# Patient Record
Sex: MALE | Race: WHITE | Employment: OTHER | URBAN - METROPOLITAN AREA
[De-identification: names, ages, dates, MRNs, and addresses within clinical notes are randomized per-mention and may not be internally consistent; named-entity substitution may affect disease eponyms.]

---

## 2017-03-30 ENCOUNTER — TRANSFERRED RECORDS (OUTPATIENT)
Dept: HEALTH INFORMATION MANAGEMENT | Facility: CLINIC | Age: 67
End: 2017-03-30

## 2017-06-16 ENCOUNTER — APPOINTMENT (OUTPATIENT)
Age: 67
Setting detail: DERMATOLOGY
End: 2017-07-04

## 2017-06-16 DIAGNOSIS — Z85.828 PERSONAL HISTORY OF OTHER MALIGNANT NEOPLASM OF SKIN: ICD-10-CM

## 2017-06-16 DIAGNOSIS — L57.0 ACTINIC KERATOSIS: ICD-10-CM

## 2017-06-16 DIAGNOSIS — L82.1 OTHER SEBORRHEIC KERATOSIS: ICD-10-CM

## 2017-06-16 DIAGNOSIS — L82.0 INFLAMED SEBORRHEIC KERATOSIS: ICD-10-CM

## 2017-06-16 PROBLEM — D48.5 NEOPLASM OF UNCERTAIN BEHAVIOR OF SKIN: Status: ACTIVE | Noted: 2017-06-16

## 2017-06-16 PROCEDURE — 17000 DESTRUCT PREMALG LESION: CPT | Mod: 59

## 2017-06-16 PROCEDURE — 99213 OFFICE O/P EST LOW 20 MIN: CPT | Mod: 25

## 2017-06-16 PROCEDURE — OTHER BIOPSY BY SHAVE METHOD: OTHER

## 2017-06-16 PROCEDURE — 17111 DESTRUCT LESION 15 OR MORE: CPT

## 2017-06-16 PROCEDURE — OTHER LIQUID NITROGEN: OTHER

## 2017-06-16 PROCEDURE — 17003 DESTRUCT PREMALG LES 2-14: CPT

## 2017-06-16 PROCEDURE — 11100: CPT | Mod: 59

## 2017-06-16 PROCEDURE — OTHER MIPS QUALITY: OTHER

## 2017-06-16 PROCEDURE — OTHER COUNSELING: OTHER

## 2017-06-16 ASSESSMENT — LOCATION DETAILED DESCRIPTION DERM
LOCATION DETAILED: RIGHT INFERIOR CENTRAL MALAR CHEEK
LOCATION DETAILED: LEFT LATERAL MALAR CHEEK
LOCATION DETAILED: RIGHT SUPERIOR CENTRAL BUCCAL CHEEK
LOCATION DETAILED: LEFT SUPERIOR FOREHEAD
LOCATION DETAILED: RIGHT CENTRAL LATERAL NECK
LOCATION DETAILED: LEFT CENTRAL EYEBROW
LOCATION DETAILED: RIGHT SUPERIOR FOREHEAD
LOCATION DETAILED: LEFT LATERAL SUBMANDIBULAR CHEEK
LOCATION DETAILED: LEFT SUPERIOR LATERAL MALAR CHEEK
LOCATION DETAILED: LEFT SUPERIOR CENTRAL MALAR CHEEK
LOCATION DETAILED: RIGHT CENTRAL MALAR CHEEK
LOCATION DETAILED: LEFT INFERIOR LATERAL MALAR CHEEK

## 2017-06-16 ASSESSMENT — LOCATION SIMPLE DESCRIPTION DERM
LOCATION SIMPLE: NECK
LOCATION SIMPLE: LEFT FOREHEAD
LOCATION SIMPLE: LEFT EYEBROW
LOCATION SIMPLE: RIGHT CHEEK
LOCATION SIMPLE: LEFT CHEEK
LOCATION SIMPLE: RIGHT FOREHEAD

## 2017-06-16 ASSESSMENT — LOCATION ZONE DERM
LOCATION ZONE: NECK
LOCATION ZONE: FACE

## 2017-06-16 NOTE — PROCEDURE: BIOPSY BY SHAVE METHOD
Post-Care Instructions: I reviewed with the patient in detail post-care instructions. Patient is to keep the biopsy site dry overnight, and then apply H2O2, Vaseline and a bandage daily until healed.
Electrodesiccation And Curettage Text: The wound bed was treated with electrodesiccation and curettage after the biopsy was performed.
Dressing: telfa dressing
Anesthesia Volume In Cc (Will Not Render If 0): 0.1
Cryotherapy Text: The wound bed was treated with cryotherapy after the biopsy was performed.
Biopsy Method: Double edge Personna blades
Body Location Override (Optional - Billing Will Still Be Based On Selected Body Map Location If Applicable): right infra-auricular neck
Notification Instructions: Patient will be notified of biopsy results. However, patient instructed to call the office if not contacted within 2 weeks.
Biopsy Type: H and E
Destruction After The Procedure: Yes
Bill 55423 For Specimen Handling/Conveyance To Laboratory?: no
Type Of Destruction Used: Electrodesiccation
Silver Nitrate Text: The wound bed was treated with silver nitrate after the biopsy was performed.
Size Of Lesion In Cm: 0.5
Electrodesiccation Text: The wound bed was treated with electrodesiccation after the biopsy was performed.
Additional Anesthesia Volume In Cc (Will Not Render If 0): 0
Curettage Text: The wound bed was treated with curettage after the biopsy was performed.
Anesthesia Type: 1% lidocaine with epinephrine and a 1:10 solution of 8.4% sodium bicarbonate
Wound Care: Vaseline
Detail Level: Detailed
Hemostasis: Drysol
Consent: Written consent was obtained and risks were reviewed including but not limited to scarring, infection, bleeding, scabbing, incomplete removal, nerve damage and allergy to anesthesia.
Billing Type: Third-Party Bill

## 2017-06-16 NOTE — PROCEDURE: LIQUID NITROGEN
Post-Care Instructions: I reviewed with the patient in detail post-care instructions. Patient is to wear sunprotection, and avoid picking at any of the treated lesions. Pt may apply Vaseline to crusted or scabbing areas.
Add 52 Modifier (Optional): no
Number Of Freeze-Thaw Cycles: 1 freeze-thaw cycle
Medical Necessity Clause: This procedure was medically necessary because the lesions that were treated were:
Consent: The patient's consent was obtained including but not limited to risks of crusting, scabbing, blistering, scarring, darker or lighter pigmentary change, recurrence, incomplete removal and infection.
Duration Of Freeze Thaw-Cycle (Seconds): 15
Medical Necessity Information: It is in your best interest to select a reason for this procedure from the list below. All of these items fulfill various CMS LCD requirements except the new and changing color options.
Detail Level: Detailed
Total Number Of Lesions Treated: 18
Render Post-Care Instructions In Note?: yes
Detail Level: Simple

## 2017-06-16 NOTE — PROCEDURE: MIPS QUALITY
Quality 265: Biopsy Follow-Up: Biopsy results reviewed, communicated, tracked, and documented
Quality 110: Preventive Care And Screening: Influenza Immunization: Influenza Immunization previously received during influenza season
Detail Level: Detailed
Quality 431: Preventive Care And Screening: Unhealthy Alcohol Use - Screening: Patient screened for unhealthy alcohol use using a single question and scores less than 2 times per year
Quality 130: Documentation Of Current Medications In The Medical Record: Current Medications Documented
Quality 226: Preventive Care And Screening: Tobacco Use: Screening And Cessation Intervention: Patient screened for tobacco and never smoked

## 2017-07-24 ENCOUNTER — DOCUMENTATION ONLY (OUTPATIENT)
Dept: VASCULAR SURGERY | Facility: CLINIC | Age: 67
End: 2017-07-24

## 2017-07-24 ENCOUNTER — RADIANT APPOINTMENT (OUTPATIENT)
Dept: GENERAL RADIOLOGY | Facility: CLINIC | Age: 67
End: 2017-07-24
Attending: FAMILY MEDICINE
Payer: COMMERCIAL

## 2017-07-24 ENCOUNTER — OFFICE VISIT (OUTPATIENT)
Dept: FAMILY MEDICINE | Facility: CLINIC | Age: 67
End: 2017-07-24
Payer: COMMERCIAL

## 2017-07-24 VITALS
SYSTOLIC BLOOD PRESSURE: 120 MMHG | HEART RATE: 72 BPM | OXYGEN SATURATION: 95 % | BODY MASS INDEX: 42 KG/M2 | HEIGHT: 71 IN | WEIGHT: 300 LBS | DIASTOLIC BLOOD PRESSURE: 80 MMHG | TEMPERATURE: 98.9 F

## 2017-07-24 DIAGNOSIS — M79.674 PAIN OF TOE OF RIGHT FOOT: ICD-10-CM

## 2017-07-24 DIAGNOSIS — Z13.6 SCREENING FOR AAA (ABDOMINAL AORTIC ANEURYSM): ICD-10-CM

## 2017-07-24 DIAGNOSIS — M77.41 METATARSALGIA OF RIGHT FOOT: Primary | ICD-10-CM

## 2017-07-24 DIAGNOSIS — E66.01 MORBID OBESITY DUE TO EXCESS CALORIES (H): ICD-10-CM

## 2017-07-24 PROCEDURE — 99213 OFFICE O/P EST LOW 20 MIN: CPT | Performed by: FAMILY MEDICINE

## 2017-07-24 PROCEDURE — 73630 X-RAY EXAM OF FOOT: CPT | Mod: RT

## 2017-07-24 RX ORDER — NAPROXEN 500 MG/1
500 TABLET ORAL 2 TIMES DAILY PRN
Qty: 20 TABLET | Refills: 0 | Status: SHIPPED | OUTPATIENT
Start: 2017-07-24 | End: 2018-02-20

## 2017-07-24 NOTE — PROGRESS NOTES
"  SUBJECTIVE:                                                    Edis Beltre is a 67 year old male who presents to clinic today for the following health issues:    Toe Pain    Onset: had a fungus on the nail 3 months ago went away- last 6 weeks foot is sensitive    Description:   Location: 2nd toe right foot is painful- pad is painful also  Character: Dull ache with throbbing and pressure    Intensity: mild    Progression of Symptoms: same    Accompanying Signs & Symptoms:  Other symptoms: none  Starting to limp as pressure on the area hurts  Other joint pain: no  Pain does not shoot up toe    History:   Previous similar pain: no   History of gout: no      Precipitating factors:   Trauma or overuse: no   Patient wears crocs very often    Alleviating factors:  Improved by: rest/inactivity  Soaking and wrapping with treated bandage    Therapies Tried and outcome nothing  Patient will be doing a lot of lifting in 2 weeks      Patient was taking a statin and self-stopped due to bad muscle aches.    Problem list and histories reviewed & adjusted, as indicated.  Additional history: as documented    ROS:  Constitutional, HEENT, cardiovascular, pulmonary, GI, , musculoskeletal, neuro, skin, endocrine and psych systems are negative, except as otherwise noted.    This document serves as a record of the services and decisions personally performed and made by Pedro Brasher MD. It was created on his behalf by Juana Dolan, a trained medical scribe. The creation of this document is based the provider's statements to the medical scribe.  Juana Dolan   OBJECTIVE:                                                    /80 (BP Location: Left arm, Patient Position: Chair, Cuff Size: Adult Large)  Pulse 72  Temp 98.9  F (37.2  C) (Oral)  Ht 1.803 m (5' 11\")  Wt 136.1 kg (300 lb)  SpO2 95%  BMI 41.84 kg/m2 Body mass index is 41.84 kg/(m^2).   GENERAL: healthy, alert, well nourished, well hydrated, no distress  EYES: " "Eyes grossly normal to inspection, extraocular movements - intact  MS: moderate tenderness to 2nd MCP and between the 1st and 2nd MCP on the right, otherwise, extremities- no gross deformities noted, no edema  SKIN: no suspicious lesions, no rashes  PSYCH: Alert and oriented times 3; speech- coherent , normal rate and volume; able to articulate logical thoughts, able to abstract reason, no tangential thoughts, no hallucinations or delusions, affect- normal  Diagnostic test results: Right foot Xray:  Slight calcification of 2nd MCP joint, pending formal reading from radiologist     ASSESSMENT/PLAN:         Edis was seen today for toe pain.    Diagnoses and all orders for this visit:    Metatarsalgia of right foot: New onset, we discussed the importance of wearing supportive shoes and he may also try a metatarsal pad for 2-4 weeks. He will also take naproxen as needed and ice to help symptoms. Given a handout on metatarsalgia. May consider injection if symptoms persist.  -     naproxen (NAPROSYN) 500 MG tablet; Take 1 tablet (500 mg) by mouth 2 times daily as needed for moderate pain    Pain of toe of right foot: new onset, see above  -     [facility] NOTIFICATION  -     XR Foot Right G/E 3 Views; Future    Morbid obesity due to excess calories (H): Unchanged    Screening for AAA (abdominal aortic aneurysm)  -     [facility] NOTIFICATION    Risks, benefits and alternatives of treatments discussed. Plan agreed on.      Followup: prn    Will call, return to clinic, or go to ED if worsening or symptoms not improving as discussed.    See patient instructions.     BMI:   Estimated body mass index is 41.84 kg/(m^2) as calculated from the following:    Height as of this encounter: 1.803 m (5' 11\").    Weight as of this encounter: 136.1 kg (300 lb).   Weight management plan: Discussed healthy diet and exercise guidelines and patient will follow up in 6 months in clinic to re-evaluate.        Health Maintenance Topics " with due status: Overdue       Topic Date Due    HEPATITIS C SCREENING 05/24/1966    [facility] ANEURYSM SCREENING (SYSTEM ASSIGNED) 05/24/2013    FALL RISK ASSESSMENT 05/15/2016    LIPID MONITORING Q1 YEAR 02/17/2017    PSA Q1 YR 02/17/2017    WELLNESS VISIT Q1 YR 02/17/2017    BMP Q1 YR 02/26/2017       Health maintenance reviewed/updated? Yes  The information in this document, created by the medical scribe for me, accurately reflects the services I personally performed and the decisions made by me. I have reviewed and approved this document for accuracy prior to leaving the patient care area.  Pedro Brasher MD July 24, 2017 11:13 AM        Chalino Brasher MD

## 2017-07-24 NOTE — MR AVS SNAPSHOT
After Visit Summary   7/24/2017    Edis Beltre    MRN: [id number]           Patient Information     Date Of Birth          1950        Visit Information        Provider Department      7/24/2017 10:40 AM Pedro Brasher MD [facility]        Today's Diagnoses     Metatarsalgia of right foot    -  1    Pain of toe of right foot        Morbid obesity due to excess calories (H)        Screening for AAA (abdominal aortic aneurysm)          Care Instructions      What is Metatarsalgia?  Metatarsalgia is pain in the ball of your foot. It is often caused by wearing shoes with thin soles and high heels. This puts extra pressure on the bones in the ball of the foot. Standing or walking on a hard surface for long periods also puts added pressure on the bones, causing pain. The pain can occur under any of the five metatarsal bones, although it's most common in the second. Bent or twisted toes and bunions can make the problem worse. So can being overweight. Sometimes high arches or arthritis can also cause metatarsalgia.    Inside the ball of your foot  The long bones in the middle of your foot are called the metatarsal bones. Each metatarsal bone ends in the ball of the foot. When you walk, these bones bear the weight of your body as your foot pushes off the ground. If there is more pressure on the end of one bone, it presses on the skin beneath it. This causes pain and inflammation in the ball of the foot (metatarsalgia). A callus (a hard growth of skin) may also form on the ball of the foot.    Symptoms  The most common symptom of metatarsalgia is pain in the ball of the foot. It may feel as if you have a stone in your shoe. The ball of the foot may also become red and inflamed, and a callus may form under the end of the metatarsal bone.   Date Last Reviewed: 9/29/2015 2000-2017 [employer]. [address] [place] [address], [place] [address]. All rights reserved. This  "information is not intended as a substitute for professional medical care. Always follow your healthcare professional's instructions.        Treating Metatarsalgia  Metatarsalgia is pain in the \"ball of your foot,\" the area between your arch and your toes. The pain usually starts in one or more of the five bones in this area under the toes (these bones are called the metatarsals). Often, a callus builds up in this area.In most cases, wearing low-heeled, well-cushioned shoes and filing down the callus will relieve the pain. If these steps don t work, your healthcare provider may suggest surgery to remove part of the bone. To take pressure off the ball of your foot and relieve the pain, your healthcare provider may suggest that you do one or more of the following.  Wear shoes with padded soles  Wear shoes with thick padding in the soles. Keep heels low, and make sure the shoe is wide across the ball of your foot and your toes.    Using a metatarsal pad  Put a metatarsal pad in your shoe. This lifts and takes pressure off the painful area. To insert the pad:    Put a small lipstick beto on the callus.    Step into the shoe to leave a beto on the insole.    Peel the backing off the pad. Then put the pad in the shoe just behind the lipstick beto.  Inserts with built-in metatarsal pads may also be available.  Filing the callus    Soak your foot in warm water for a few minutes to soften the skin.    Dry the foot.    Then gently rub the callus with a pumice stone or nail file to remove the hard skin. Stop if bleeding or pain occurs.    Diabetes should get foot care from healthcare providers familiar with diabetes care.  Date Last Reviewed: 9/29/2015 2000-2017 [employer]. [address] [place] [address], [place] [address]. All rights reserved. This information is not intended as a substitute for professional medical care. Always follow your healthcare professional's instructions.                Follow-ups after your " "visit        Who to contact     If you have questions or need follow up information about today's clinic visit or your schedule please contact [facility] directly at [phone].  Normal or non-critical lab and imaging results will be communicated to you by MyChart, letter or phone within 4 business days after the clinic has received the results. If you do not hear from us within 7 days, please contact the clinic through [facility] or phone. If you have a critical or abnormal lab result, we will notify you by phone as soon as possible.  Submit refill requests through [facility] or call your pharmacy and they will forward the refill request to us. Please allow 3 business days for your refill to be completed.          Additional Information About Your Visit        [facility] Information     [facility] gives you secure access to your electronic health record. If you see a primary care provider, you can also send messages to your care team and make appointments. If you have questions, please call your primary care clinic.  If you do not have a primary care provider, please call [phone] and they will assist you.        Care EveryWhere ID     This is your Care EveryWhere ID. This could be used by other organizations to access your [facility] medical records  [id number]        Your Vitals Were     Pulse Temperature Height Pulse Oximetry BMI (Body Mass Index)       72 98.9  F (37.2  C) (Oral) 5' 11\" (1.803 m) 95% 41.84 kg/m2        Blood Pressure from Last 3 Encounters:   07/24/17 120/80   11/01/16 138/76   04/19/16 140/72    Weight from Last 3 Encounters:   07/24/17 300 lb (136.1 kg)   11/01/16 296 lb (134.3 kg)   04/19/16 296 lb (134.3 kg)              We Performed the Following     [facility] NOTIFICATION          Today's Medication Changes          These changes are accurate as of: 7/24/17 11:48 AM.  If you have any questions, ask your nurse or doctor.               Start taking these medicines.        " Dose/Directions    naproxen 500 MG tablet   Commonly known as:  NAPROSYN   Used for:  Metatarsalgia of right foot   Started by:  Pedro Brasher MD        Dose:  500 mg   Take 1 tablet (500 mg) by mouth 2 times daily as needed for moderate pain   Quantity:  20 tablet   Refills:  0         These medicines have changed or have updated prescriptions.        Dose/Directions    omeprazole 20 MG CR capsule   Commonly known as:  priLOSEC   This may have changed:  Another medication with the same name was removed. Continue taking this medication, and follow the directions you see here.   Used for:  Gastroesophageal reflux disease without esophagitis   Changed by:  Pedro Brasher MD        TAKE 1 CAPSULE EVERY DAY  30  TO  60  MINUTES BEFORE A MEAL   Quantity:  90 capsule   Refills:  1         Stop taking these medicines if you haven't already. Please contact your care team if you have questions.     atorvastatin 20 MG tablet   Commonly known as:  LIPITOR   Stopped by:  Pedro Brasher MD                Where to get your medicines      These medications were sent to [facility] [address] [place] [id number] [facility]  [id number] [place] [address]     Phone:  [phone]     naproxen 500 MG tablet                Primary Care Provider Office Phone # Fax #    Pedro Brasher [phone]       [address] [address]        Equal Access to Services     [facility] AH: Hadii tyree ku hadasho Soomaali, waaxda luqadaha, qaybta kaalmada adeegyada, waxay mariselain haybal sosa . So [facility] [phone].    ATENCIÓN: Si habla español, tiene a medel disposición servicios gratuitos de asistencia lingüística. Llame al [phone].    We comply with applicable federal civil rights laws and [place] laws. We do not discriminate on the basis of race, color, national origin, age, disability sex, sexual orientation or gender identity.            Thank  you!     Thank you for choosing [facility]  for your care. Our goal is always to provide you with excellent care. Hearing back from our patients is one way we can continue to improve our services. Please take a few minutes to complete the written survey that you may receive in the mail after your visit with us. Thank you!             Your Updated Medication List - Protect others around you: Learn how to safely use, store and throw away your medicines at www.disposemymeds.org.          This list is accurate as of: 7/24/17 11:48 AM.  Always use your most recent med list.                   Brand Name Dispense Instructions for use Diagnosis    aspirin 81 MG tablet     30 tablet    Take by mouth daily        Daily Multivitamin Tabs      1 TABLET DAILY        fexofenadine-pseudoePHEDrine  MG per 12 hr tablet    ALLEGRA-D    28 tablet    Take 1 tablet by mouth 2 times daily    Acute frontal sinusitis, recurrence not specified       fish oil-omega-3 fatty acids 1000 MG capsule      Take 1 g by mouth daily        naproxen 500 MG tablet    NAPROSYN    20 tablet    Take 1 tablet (500 mg) by mouth 2 times daily as needed for moderate pain    Metatarsalgia of right foot       omeprazole 20 MG CR capsule    priLOSEC    90 capsule    TAKE 1 CAPSULE EVERY DAY  30  TO  60  MINUTES BEFORE A MEAL    Gastroesophageal reflux disease without esophagitis       order for DME     1 Device    Equipment being ordered: CPAP and all needed supplies.    Hypersomnia with sleep apnea, unspecified       pravastatin 20 MG tablet    PRAVACHOL    90 tablet    Take 1 tablet (20 mg) by mouth daily    Hyperlipidemia LDL goal <100

## 2017-07-24 NOTE — PATIENT INSTRUCTIONS
"  What is Metatarsalgia?  Metatarsalgia is pain in the ball of your foot. It is often caused by wearing shoes with thin soles and high heels. This puts extra pressure on the bones in the ball of the foot. Standing or walking on a hard surface for long periods also puts added pressure on the bones, causing pain. The pain can occur under any of the five metatarsal bones, although it's most common in the second. Bent or twisted toes and bunions can make the problem worse. So can being overweight. Sometimes high arches or arthritis can also cause metatarsalgia.    Inside the ball of your foot  The long bones in the middle of your foot are called the metatarsal bones. Each metatarsal bone ends in the ball of the foot. When you walk, these bones bear the weight of your body as your foot pushes off the ground. If there is more pressure on the end of one bone, it presses on the skin beneath it. This causes pain and inflammation in the ball of the foot (metatarsalgia). A callus (a hard growth of skin) may also form on the ball of the foot.    Symptoms  The most common symptom of metatarsalgia is pain in the ball of the foot. It may feel as if you have a stone in your shoe. The ball of the foot may also become red and inflamed, and a callus may form under the end of the metatarsal bone.   Date Last Reviewed: 9/29/2015 2000-2017 [employer]. [address] [place] [address]. All rights reserved. This information is not intended as a substitute for professional medical care. Always follow your healthcare professional's instructions.        Treating Metatarsalgia  Metatarsalgia is pain in the \"ball of your foot,\" the area between your arch and your toes. The pain usually starts in one or more of the five bones in this area under the toes (these bones are called the metatarsals). Often, a callus builds up in this area.In most cases, wearing low-heeled, well-cushioned shoes and filing down the callus will " relieve the pain. If these steps don t work, your healthcare provider may suggest surgery to remove part of the bone. To take pressure off the ball of your foot and relieve the pain, your healthcare provider may suggest that you do one or more of the following.  Wear shoes with padded soles  Wear shoes with thick padding in the soles. Keep heels low, and make sure the shoe is wide across the ball of your foot and your toes.    Using a metatarsal pad  Put a metatarsal pad in your shoe. This lifts and takes pressure off the painful area. To insert the pad:    Put a small lipstick beto on the callus.    Step into the shoe to leave a beto on the insole.    Peel the backing off the pad. Then put the pad in the shoe just behind the lipstick beto.  Inserts with built-in metatarsal pads may also be available.  Filing the callus    Soak your foot in warm water for a few minutes to soften the skin.    Dry the foot.    Then gently rub the callus with a pumice stone or nail file to remove the hard skin. Stop if bleeding or pain occurs.    Diabetes should get foot care from healthcare providers familiar with diabetes care.  Date Last Reviewed: 9/29/2015 2000-2017 [employer]. [address] [place] [address], [place] [address]. All rights reserved. This information is not intended as a substitute for professional medical care. Always follow your healthcare professional's instructions.

## 2017-07-24 NOTE — NURSING NOTE
"Chief Complaint   Patient presents with     Toe Pain       Initial /80 (BP Location: Left arm, Patient Position: Chair, Cuff Size: Adult Large)  Pulse 72  Temp 98.9  F (37.2  C) (Oral)  Ht 5' 11\" (1.803 m)  Wt 300 lb (136.1 kg)  SpO2 95%  BMI 41.84 kg/m2 Estimated body mass index is 41.84 kg/(m^2) as calculated from the following:    Height as of this encounter: 5' 11\" (1.803 m).    Weight as of this encounter: 300 lb (136.1 kg).  Medication Reconciliation: complete  "

## 2017-07-31 ENCOUNTER — APPOINTMENT (OUTPATIENT)
Age: 67
Setting detail: DERMATOLOGY
End: 2017-08-01

## 2017-07-31 PROBLEM — C44.91 BASAL CELL CARCINOMA OF SKIN, UNSPECIFIED: Status: ACTIVE | Noted: 2017-07-31

## 2017-07-31 PROCEDURE — OTHER MOHS SURGERY PHONE CONSULTATION: OTHER

## 2017-07-31 NOTE — HPI: MOHS SURGERY CONSULTATION
Additional History: The patient has received a prescription of Amoxicillin 500mg 4 po prior to surgery, from his dentist. He plans to take this tomorrow morning prior to his appointment at 8:00 am. He will be accompanied by his wife, Silvina, and will be performing his own wound care.

## 2017-07-31 NOTE — PROCEDURE: MOHS SURGERY PHONE CONSULTATION
Patient Reported Location: right neck
Does The Patient Take Antibiotics Prior To Dental Procedures?: Yes
Has The Patient Ever Had A Heart Attack Or Stroke?: No
If Yes- Additional Details: right knee (2016)
If Yes- What Blood Thinners Do They Take?: 81mg Aspirin
Date Of Mohs Surgery: 08/01/2017
Detail Level: Detailed
Referring Provider: Nani Barrett MPhil, MD
Office Location Of Mohs Surgery: [facility]

## 2017-08-01 ENCOUNTER — APPOINTMENT (OUTPATIENT)
Age: 67
Setting detail: DERMATOLOGY
End: 2017-08-04

## 2017-08-01 PROBLEM — C44.41 BASAL CELL CARCINOMA OF SKIN OF SCALP AND NECK: Status: ACTIVE | Noted: 2017-08-01

## 2017-08-01 PROCEDURE — OTHER MOHS SURGERY: OTHER

## 2017-08-01 PROCEDURE — 17312 MOHS ADDL STAGE: CPT

## 2017-08-01 PROCEDURE — 17311 MOHS 1 STAGE H/N/HF/G: CPT

## 2017-08-01 PROCEDURE — 13132 CMPLX RPR F/C/C/M/N/AX/G/H/F: CPT

## 2017-08-01 NOTE — PROCEDURE: MOHS SURGERY
Stage 6: Additional Anesthesia Volume In Cc: 0
Unna Boot Applied: No
Consent (Spinal Accessory)/Introductory Paragraph: The rationale for Mohs was explained to the patient and consent was obtained. The risks, benefits and alternatives to therapy were discussed in detail. Specifically, the risks of damage to the spinal accessory nerve, infection, scarring, bleeding, prolonged wound healing, incomplete removal, allergy to anesthesia, and recurrence were addressed. Prior to the procedure, the treatment site was clearly identified and confirmed by the patient. All components of Universal Protocol/PAUSE Rule completed.
Purse String (Intermediate) Text: Given the location of the defect and the characteristics of the surrounding skin a pursestring intermediate closure was deemed most appropriate.  Undermining was performed circumfirentially around the surgical defect.  A purstring suture was then placed and tightened.
Tarsorrhaphy Text: A tarsorrhaphy was performed using Frost sutures.
Cheiloplasty (Complex) Text: A decision was made to reconstruct the defect with a  cheiloplasty.  The defect was undermined extensively.  Additional obicularis oris muscle was excised with a 15 blade scalpel.  The defect was converted into a full thickness wedge to facilite a better cosmetic result.  Small vessels were then tied off with 5-0 monocyrl. The obicularis oris, superficial fascia, adipose and dermis were then reapproximated.  After the deeper layers were approximated the epidermis was reapproximated with particular care given to realign the vermillion border.
Complex Repair And Graft Additional Text (Will Appearing After The Standard Complex Repair Text): The complex repair was not sufficient to completely close the primary defect. The remaining additional defect was repaired with the graft mentioned below.
Home Suture Removal Text: Patient was provided instructions on removing sutures and will remove their sutures at home.  If they have any questions or difficulties they will call the office.
Initial Size Of Lesion: 1.2
No Repair - Repaired With Adjacent Surgical Defect Text (Leave Blank If You Do Not Want): After obtaining clear surgical margins the defect was repaired concurrently with another surgical defect which was in close approximation.
Anesthesia Type: 1% lidocaine with epinephrine and a 1:10 solution of 8.4% sodium bicarbonate
Purse String (Simple) Text: Given the location of the defect and the characteristics of the surrounding skin a pursestring closure was deemed most appropriate.  Undermining was performed circumfirentially around the surgical defect.  A purstring suture was then placed and tightened.
Advancement-Rotation Flap Text: The defect edges were debeveled with a #15 scalpel blade.  Given the location of the defect, shape of the defect and the proximity to free margins an advancement-rotation flap was deemed most appropriate.  Using a sterile surgical marker, an appropriate flap was drawn incorporating the defect and placing the expected incisions within the relaxed skin tension lines where possible. The area thus outlined was incised deep to adipose tissue with a #15 scalpel blade.  The skin margins were undermined to an appropriate distance in all directions utilizing iris scissors.
H Plasty Text: Given the location of the defect, shape of the defect and the proximity to free margins a H-plasty was deemed most appropriate for repair.  Using a sterile surgical marker, the appropriate advancement arms of the H-plasty were drawn incorporating the defect and placing the expected incisions within the relaxed skin tension lines where possible. The area thus outlined was incised deep to adipose tissue with a #15 scalpel blade. The skin margins were undermined to an appropriate distance in all directions utilizing iris scissors.  The opposing advancement arms were then advanced into place in opposite direction and anchored with interrupted buried subcutaneous sutures.
Mohs Case Number: 
Alternatives Discussed Intro (Do Not Add Period): I discussed alternative treatments to Mohs surgery and specifically discussed the risks and benefits of
Consent (Temporal Branch)/Introductory Paragraph: The rationale for Mohs was explained to the patient and consent was obtained. The risks, benefits and alternatives to therapy were discussed in detail. Specifically, the risks of damage to the temporal branch of the facial nerve, infection, scarring, bleeding, prolonged wound healing, incomplete removal, allergy to anesthesia, and recurrence were addressed. Prior to the procedure, the treatment site was clearly identified and confirmed by the patient. All components of Universal Protocol/PAUSE Rule completed.
Consent (Ear)/Introductory Paragraph: The rationale for Mohs was explained to the patient and consent was obtained. The risks, benefits and alternatives to therapy were discussed in detail. Specifically, the risks of ear deformity, infection, scarring, bleeding, prolonged wound healing, incomplete removal, allergy to anesthesia, nerve injury and recurrence were addressed. Prior to the procedure, the treatment site was clearly identified and confirmed by the patient. All components of Universal Protocol/PAUSE Rule completed.
Advancement Flap (Single) Text: In order to preserve normal anatomic and functional relationships, a single advancement flap was deemed the most appropriate reconstructive procedure.  The beveled edges of the Mohs defect were excised with a #15 scalpel blade.    The flap was designed to fall along relaxed skin tension lines and/or free margins, incised, and elevated using blunt curved tissue scissors.  Hemostasis was achieved.  Interrupted buried vertical mattress sutures were employed to secure the flap in place.  Redundant cutaneous columns defined by the flap's movement were removed to the adipose layer using the triangulation technique and repaired in similar fashion.  Final epidermal approximation along the length of the flap was achieved using epidermal sutures.  The wound was stressed in various directions to ensure the adequacy of the closure and of hemostasis.  The flap edges appeared pink and well perfused.  Reconstruction was considered complete.
Show Additional Anesthesia Variables In The Stage Tabs: Yes
Spiral Flap Text: The defect edges were debeveled with a #15 scalpel blade.  Given the location of the defect, shape of the defect and the proximity to free margins a spiral flap was deemed most appropriate.  Using a sterile surgical marker, an appropriate rotation flap was drawn incorporating the defect and placing the expected incisions within the relaxed skin tension lines where possible. The area thus outlined was incised deep to adipose tissue with a #15 scalpel blade.  The skin margins were undermined to an appropriate distance in all directions utilizing iris scissors.
Anesthesia Type: 1% lidocaine with 1:100,000 epinephrine and a 1:10 solution of 8.4% sodium bicarbonate
Repair Performed By Another Provider Text (Leave Blank If You Do Not Want): After obtaining clear surgical margins the defect was repaired by another provider.
Number Of Stages: 2
Closure 2 Information: This tab is for additional flaps and grafts, including complex repair and grafts and complex repair and flaps. You can also specify a different location for the additional defect, if the location is the same you do not need to select a new one. We will insert the automated text for the repair you select below just as we do for solitary flaps and grafts. Please note that at this time if you select a location with a different insurance zone you will need to override the ICD10 and CPT if appropriate.
Consent (Scalp)/Introductory Paragraph: The rationale for Mohs was explained to the patient and consent was obtained. The risks, benefits and alternatives to therapy were discussed in detail. Specifically, the risks of changes in hair growth pattern secondary to repair, infection, scarring, bleeding, prolonged wound healing, incomplete removal, allergy to anesthesia, nerve injury and recurrence were addressed. Prior to the procedure, the treatment site was clearly identified and confirmed by the patient. All components of Universal Protocol/PAUSE Rule completed.
Post-Care Instructions: The patient was provided with detailed verbal and written instructions for daily wound care, including use of H2O2 cleansing, followed by application of plain Vaseline and a bandage.  These instructions including Dr. Barrett's contact information should there be any questions or concerns.  The patient is not to engage in any heavy lifting, exercise, or swimming for the next week.  Should the patient develop any fevers, chills, bleeding, or pain not controlled by OTC analgesics, s/he should contact the office immediately.
Anesthesia Volume In Cc: 6
Star Wedge Flap Text: The defect edges were debeveled with a #15 scalpel blade.  Given the location of the defect, shape of the defect and the proximity to free margins a star wedge flap was deemed most appropriate.  Using a sterile surgical marker, an appropriate rotation flap was drawn incorporating the defect and placing the expected incisions within the relaxed skin tension lines where possible. The area thus outlined was incised deep to adipose tissue with a #15 scalpel blade.  The skin margins were undermined to an appropriate distance in all directions utilizing iris scissors.
Medical Necessity Statement: Based on my medical judgement, Mohs surgery is the most appropriate treatment for this cancer compared to other treatments.
Partial Purse String (Intermediate) Text: Given the location of the defect and the characteristics of the surrounding skin an intermediate purse string closure was deemed most appropriate.  Undermining was performed circumfirentially around the surgical defect.  A purse string suture was then placed and tightened. Wound tension only allowed a partial closure of the circular defect.
Closure 4 Information: This tab is for additional flaps and grafts above and beyond our usual structured repairs.  Please note if you enter information here it will not currently bill and you will need to add the billing information manually.
Muscle Hinge Flap Text: The defect edges were debeveled with a #15 scalpel blade.  Given the size, depth and location of the defect and the proximity to free margins a muscle hinge flap was deemed most appropriate.  Using a sterile surgical marker, an appropriate hinge flap was drawn incorporating the defect. The area thus outlined was incised with a #15 scalpel blade.  The skin margins were undermined to an appropriate distance in all directions utilizing iris scissors.
Interpolation Flap Text: A decision was made to reconstruct the defect utilizing an interpolation axial flap and a staged reconstruction.  A telfa template was made of the defect.  This telfa template was then used to outline the interpolation flap.  The donor area for the pedicle flap was then injected with anesthesia.  The flap was excised through the skin and subcutaneous tissue down to the layer of the underlying musculature.  The interpolation flap was carefully excised within this deep plane to maintain its blood supply.  The edges of the donor site were undermined.   The donor site was closed in a primary fashion.  The pedicle was then rotated into position and sutured.  Once the tube was sutured into place, adequate blood supply was confirmed with blanching and refill.  The pedicle was then wrapped with xeroform gauze and dressed appropriately with a telfa and gauze bandage to ensure continued blood supply and protect the attached pedicle.
Hatchet Flap Text: The defect edges were debeveled with a #15 scalpel blade.  Given the location of the defect, shape of the defect and the proximity to free margins a hatchet flap was deemed most appropriate.  Using a sterile surgical marker, an appropriate hatchet flap was drawn incorporating the defect and placing the expected incisions within the relaxed skin tension lines where possible.    The area thus outlined was incised deep to adipose tissue with a #15 scalpel blade.  The skin margins were undermined to an appropriate distance in all directions utilizing iris scissors.
Ear Wedge Repair Text: A wedge excision was completed by carrying down an excision through the full thickness of the ear and cartilage with an inward facing Burow's triangle. The wound was then closed in a layered fashion.
Repair Anesthesia Method: local infiltration
Area L Indication Text: Tumors in this location are included in Area L (trunk and extremities).  Mohs surgery is indicated for larger tumors, or tumors with aggressive histologic features, in these anatomic locations.
Area H Indication Text: Tumors in this location are included in Area H (eyelids, eyebrows, nose, lips, chin, ear, pre-auricular, post-auricular, temple, genitalia, hands, feet, ankles and areola).  Tissue conservation is critical in these anatomic locations.
Full Thickness Lip Wedge Repair (Flap) Text: Given the location of the defect and the proximity to free margins a full thickness wedge repair was deemed most appropriate.  Using a sterile surgical marker, the appropriate repair was drawn incorporating the defect and placing the expected incisions perpendicular to the vermilion border.  The vermilion border was also meticulously outlined to ensure appropriate reapproximation during the repair.  The area thus outlined was incised through and through with a #15 scalpel blade.  The muscularis and dermis were reaproximated with deep sutures following hemostasis. Care was taken to realign the vermilion border before proceeding with the superficial closure.  Once the vermilion was realigned the superfical and mucosal closure was finished.
Epidermal Closure Graft Donor Site (Optional): simple interrupted
Consent 3/Introductory Paragraph: I gave the patient a chance to ask questions they had about the procedure.  Following this I explained the Mohs procedure and consent was obtained. The risks, benefits and alternatives to therapy were discussed in detail. Specifically, the risks of infection, scarring, bleeding, prolonged wound healing, incomplete removal, allergy to anesthesia, nerve injury and recurrence were addressed. Prior to the procedure, the treatment site was clearly identified and confirmed by the patient. All components of Universal Protocol/PAUSE Rule completed.
Stage 2: Additional Anesthesia Type: 1% lidocaine with epinephrine
Skin Substitute Text: The defect edges were debeveled with a #15 scalpel blade.  Given the location of the defect, shape of the defect and the proximity to free margins a skin substitute graft was deemed most appropriate.  The graft material was trimmed to fit the size of the defect. The graft was then placed in the primary defect and oriented appropriately.
Unna Boot Text: An Unna boot was placed to help immobilize the limb and facilitate more rapid healing.
Epidermal Closure: running
Consent (Nose)/Introductory Paragraph: The rationale for Mohs was explained to the patient and consent was obtained. The risks, benefits and alternatives to therapy were discussed in detail. Specifically, the risks of nasal deformity, changes in the flow of air through the nose, infection, scarring, bleeding, prolonged wound healing, incomplete removal, allergy to anesthesia, nerve injury and recurrence were addressed. Prior to the procedure, the treatment site was clearly identified and confirmed by the patient. All components of Universal Protocol/PAUSE Rule completed.
Body Location Override (Optional - Billing Will Still Be Based On Selected Body Map Location If Applicable): Right Infra-auricular Neck
Consent (Marginal Mandibular)/Introductory Paragraph: The rationale for Mohs was explained to the patient and consent was obtained. The risks, benefits and alternatives to therapy were discussed in detail. Specifically, the risks of damage to the marginal mandibular branch of the facial nerve, infection, scarring, bleeding, prolonged wound healing, incomplete removal, allergy to anesthesia, and recurrence were addressed. Prior to the procedure, the treatment site was clearly identified and confirmed by the patient. All components of Universal Protocol/PAUSE Rule completed.
Surgeon Performing Repair (Optional): Nani Barrett MD
Suture Removal: 9 days
Alar Island Pedicle Flap Text: The defect edges were debeveled with a #15 scalpel blade.  Given the location of the defect, shape of the defect and the proximity to the alar rim an island pedicle advancement flap was deemed most appropriate.  Using a sterile surgical marker, an appropriate advancement flap was drawn incorporating the defect, outlining the appropriate donor tissue and placing the expected incisions within the nasal ala running parallel to the alar rim. The area thus outlined was incised with a #15 scalpel blade.  The skin margins were undermined minimally to an appropriate distance in all directions around the primary defect and laterally outward around the island pedicle utilizing iris scissors.  There was minimal undermining beneath the pedicle flap.
Consent (Near Eyelid Margin)/Introductory Paragraph: The rationale for Mohs was explained to the patient and consent was obtained. The risks, benefits and alternatives to therapy were discussed in detail. Specifically, the risks of ectropion or eyelid deformity, infection, scarring, bleeding, prolonged wound healing, incomplete removal, allergy to anesthesia, nerve injury and recurrence were addressed. Prior to the procedure, the treatment site was clearly identified and confirmed by the patient. All components of Universal Protocol/PAUSE Rule completed.
Estimated Blood Loss (Cc): minimal
Consent 2/Introductory Paragraph: The rationale for Mohs surgery was explained to the patient and consent was obtained. The risks, benefits and alternatives to therapy were discussed in detail. Specifically, the risks of infection, scarring, bleeding, prolonged wound healing, incomplete removal, allergy to anesthesia, nerve injury and recurrence were addressed. Prior to the procedure, the treatment site was clearly identified and confirmed by the patient. All components of Universal Protocol/PAUSE Rule completed.
Hemostasis: Aluminum Chloride
Location Indication Override (Is Already Calculated Based On Selected Body Location): Area M
Stage 1: Number Of Blocks?: 1
Bilobed Transposition Flap Text: The defect edges were debeveled with a #15 scalpel blade.  Given the location of the defect and the proximity to free margins a bilobed transposition flap was deemed most appropriate.  Using a sterile surgical marker, an appropriate bilobe flap drawn around the defect.    The area thus outlined was incised deep to adipose tissue with a #15 scalpel blade.  The skin margins were undermined to an appropriate distance in all directions utilizing iris scissors.
Detail Level: Detailed
Mauc Instructions: By selecting yes to the question below the MAUC number will be added into the note.  This will be calculated automatically based on the diagnosis chosen, the size entered, the body zone selected (H,M,L) and the specific indications you chose. You will also have the option to override the Mohs AUC if you disagree with the automatically calculated number and this option is found in the Case Summary tab.
Epidermal Sutures: 5-0 Surgipro
Tumor Debulked?: curette
Repair Type: Complex Repair
Complex Repair And Flap Additional Text (Will Appearing After The Standard Complex Repair Text): The complex repair was not sufficient to completely close the primary defect. The remaining additional defect was repaired with the flap mentioned below.
Complex Repair Preamble Text (Leave Blank If You Do Not Want): Extensive wide and differential undermining were performed.  During reconstruction, hemostasis was performed using electrofulguration.  Initial buried vertical mattress sutures defined redundant cutaneous columns (Burow's triangles) which were removed to the level of the adipose tissue using a #15 blade scalpel and the triangulation technique.  Hemostasis was obtained and the resulting defects were repaired with the same suture material and techniques.  The epidermis was then carefully apposed using polypropylene suture (running).  The wound was then stressed in various directions to assure the adequacy of closure and of hemostasis.  The wound edges were pink and well perfused.  Reconstruction was considered complete.
Primary Defect Width In Cm (Final Defect Size - Required For Flaps/Grafts): 1.1
Primary Defect Length In Cm (Final Defect Size - Required For Flaps/Grafts): 1.5
Keystone Flap Text: The defect edges were debeveled with a #15 scalpel blade.  Given the location of the defect, shape of the defect a keystone flap was deemed most appropriate.  Using a sterile surgical marker, an appropriate keystone flap was drawn incorporating the defect, outlining the appropriate donor tissue and placing the expected incisions within the relaxed skin tension lines where possible. The area thus outlined was incised deep to adipose tissue with a #15 scalpel blade.  The skin margins were undermined to an appropriate distance in all directions around the primary defect and laterally outward around the flap utilizing iris scissors.
Crescentic Advancement Flap Text: The defect edges were debeveled with a #15 scalpel blade.  Given the location of the defect and the proximity to free margins a crescentic advancement flap was deemed most appropriate.  Using a sterile surgical marker, the appropriate advancement flap was drawn incorporating the defect and placing the expected incisions within the relaxed skin tension lines where possible.    The area thus outlined was incised deep to adipose tissue with a #15 scalpel blade.  The skin margins were undermined to an appropriate distance in all directions utilizing iris scissors.
Bilateral Helical Rim Advancement Flap Text: The defect edges were debeveled with a #15 blade scalpel.  Given the location of the defect and the proximity to free margins (helical rim) a bilateral helical rim advancement flap was deemed most appropriate.  Using a sterile surgical marker, the appropriate advancement flaps were drawn incorporating the defect and placing the expected incisions between the helical rim and antihelix where possible.  The area thus outlined was incised through and through with a #15 scalpel blade.  With a skin hook and iris scissors, the flaps were gently and sharply undermined and freed up.
Graft Donor Site Bandage (Optional-Leave Blank If You Don't Want In Note): Steri-strips and a pressure bandage were applied to the donor site.
Simple / Intermediate / Complex Repair - Final Wound Length In Cm: 4
Cheiloplasty (Less Than 50%) Text: A decision was made to reconstruct the defect with a  cheiloplasty.  The defect was undermined extensively.  Additional obicularis oris muscle was excised with a 15 blade scalpel.  The defect was converted into a full thickness wedge, of less than 50% of the vertical height of the lip, to facilite a better cosmetic result.  Small vessels were then tied off with 5-0 monocyrl. The obicularis oris, superficial fascia, adipose and dermis were then reapproximated.  After the deeper layers were approximated the epidermis was reapproximated with particular care given to realign the vermillion border.
X Size Of Lesion In Cm (Optional): 0.7
Surgeon: Nani Barrett MPhil, MD
Manual Repair Warning Statement: We plan on removing the manually selected variable below in favor of our much easier automatic structured text blocks found in the previous tab. We decided to do this to help make the flow better and give you the full power of structured data. Manual selection is never going to be ideal in our platform and I would encourage you to avoid using manual selection from this point on, especially since I will be sunsetting this feature. It is important that you do one of two things with the customized text below. First, you can save all of the text in a word file so you can have it for future reference. Second, transfer the text to the appropriate area in the Library tab. Lastly, if there is a flap or graft type which we do not have you need to let us know right away so I can add it in before the variable is hidden. No need to panic, we plan to give you roughly 6 months to make the change.
Graft Donor Site Epidermal Sutures (Optional): 6-0 Surgipro
Referred To Otolaryngology For Closure Text (Leave Blank If You Do Not Want): After obtaining clear surgical margins the patient was sent to otolaryngology for surgical repair.  The patient understands they will receive post-surgical care and follow-up from the referring physician's office.
Date Of Previous Biopsy (Optional): 6/16/2017
Eye Protection Verbiage: Before proceeding with the stage, a plastic scleral shield was inserted. The globe was anesthetized with a few drops of 1% lidocaine with 1:100,000 epinephrine. Then, an appropriate sized scleral shield was chosen and coated with lacrilube ointment. The shield was gently inserted and left in place for the duration of each stage. After the stage was completed, the shield was gently removed.
Referring Physician (Optional): Nani Barrett MD
Postop Diagnosis: same
Island Pedicle Flap Text: The defect edges were debeveled with a #15 scalpel blade.  Given the location of the defect, shape of the defect and the proximity to free margins an island pedicle advancement flap was deemed the most appropriate reconstructive option. The flap was designed, incised, and elevated on its anterior pedicle using a #15 blade scalpel and blunt dissection. The skin margins were undermined to an appropriate distance in all directions around the primary defect and laterally outward around the island pedicle utilizing Ragnell scissors.  There was minimal undermining beneath the pedicle flap.  The flap was moved into the primary defect and secured in place using buried sutures.  The secondary defect was then approximated in a similar fashion.  Final epidermal approximation along the length of the flap was achieved using epidermal sutures.  The wound was stressed in various directions to ensure the adequacy of the closure and of hemostasis.  The flap edges appeared pink and well perfused.  Reconstruction was considered complete.
Consent Type: Consent 2
Referred To Mid-Level For Closure Text (Leave Blank If You Do Not Want): After obtaining clear surgical margins the patient was sent to a mid-level provider for surgical repair.  The patient understands they will receive post-surgical care and follow-up from the mid-level provider.
Wound Care: Vaseline
Island Pedicle Flap With Canthal Suspension Text: The defect edges were debeveled with a #15 scalpel blade.  Given the location of the defect, shape of the defect and the proximity to free margins an island pedicle advancement flap was deemed most appropriate.  Using a sterile surgical marker, an appropriate advancement flap was drawn incorporating the defect, outlining the appropriate donor tissue and placing the expected incisions within the relaxed skin tension lines where possible. The area thus outlined was incised deep to adipose tissue with a #15 scalpel blade.  The skin margins were undermined to an appropriate distance in all directions around the primary defect and laterally outward around the island pedicle utilizing iris scissors.  There was minimal undermining beneath the pedicle flap. A suspension suture was placed in the canthal tendon to prevent tension and prevent ectropion.
Bi-Rhombic Flap Text: The defect edges were debeveled with a #15 scalpel blade.  Given the location of the defect and the proximity to free margins a bi-rhombic flap was deemed most appropriate.  Using a sterile surgical marker, an appropriate rhombic flap was drawn incorporating the defect. The area thus outlined was incised deep to adipose tissue with a #15 scalpel blade.  The skin margins were undermined to an appropriate distance in all directions utilizing iris scissors.
Posterior Auricular Interpolation Flap Text: A decision was made to reconstruct the defect utilizing an interpolation axial flap and a staged reconstruction.  A telfa template was made of the defect.  This telfa template was then used to outline the posterior auricular interpolation flap.  The donor area for the pedicle flap was then injected with anesthesia.  The flap was excised through the skin and subcutaneous tissue down to the layer of the underlying musculature.  The pedicle flap was carefully excised within this deep plane to maintain its blood supply.  The edges of the donor site were undermined.   The donor site was closed in a primary fashion.  The pedicle was then rotated into position and sutured.  Once the tube was sutured into place, adequate blood supply was confirmed with blanching and refill.  The pedicle was then wrapped with xeroform gauze and dressed appropriately with a telfa and gauze bandage to ensure continued blood supply and protect the attached pedicle.
Area M Indication Text: Tumors in this location are included in Area M (cheek, forehead, scalp, neck, jawline and pretibial skin).  Mohs surgery is indicated for tumors in these anatomic locations.
Inflammation Suggestive Of Cancer Camouflage Histology Text: There was a dense lymphocytic infiltrate which prevented adequate histologic evaluation of adjacent structures.
Surgeon/Pathologist Verbiage (Will Incorporate Name Of Surgeon From Intro If Not Blank): operated in two distinct and integrated capacities as the surgeon and pathologist.
Dressing: dry sterile dressing
Mucosal Advancement Flap Text: Given the location of the defect, shape of the defect and the proximity to free margins a mucosal advancement flap was deemed most appropriate. Incisions were made with a 15 blade scalpel in the appropriate fashion along the cutaneous vermilion border and the mucosal lip. The remaining actinically damaged mucosal tissue was excised.  The mucosal advancement flap was then elevated to the gingival sulcus with care taken to preserve the neurovascular structures and advanced into the primary defect. Care was taken to ensure that precise realignment of the vermilion border was achieved.
Rhombic Flap Text: In order to preserve normal anatomic and functional relationships, a rhombic transposition flap was deemed the most appropriate reconstructive procedure.  The beveled edges of the Mohs defect were excised with a #15 scalpel blade.    The flap was designed to fall along relaxed skin tension lines and/or free margins, incised, and elevated using blunt curved tissue scissors.  Hemostasis was achieved.  Interrupted buried vertical mattress sutures were employed to secure the flap in place.  Redundant cutaneous columns defined by the flap's movement were removed to the adipose layer using the triangulation technique.  Final epidermal approximation along the length of the flap was achieved using epidermal sutures.  The wound was stressed in various directions to ensure the adequacy of the closure and of hemostasis.  The flap edges appeared pink and well perfused.
Secondary Intention Text (Leave Blank If You Do Not Want): The defect will heal with secondary intention.
V-Y Flap Text: The defect edges were debeveled with a #15 scalpel blade.  Given the location of the defect, shape of the defect and the proximity to free margins a V-Y flap was deemed most appropriate.  Using a sterile surgical marker, an appropriate advancement flap was drawn incorporating the defect and placing the expected incisions within the relaxed skin tension lines where possible.    The area thus outlined was incised deep to adipose tissue with a #15 scalpel blade.  The skin margins were undermined to an appropriate distance in all directions utilizing iris scissors.
Consent (Lip)/Introductory Paragraph: The rationale for Mohs was explained to the patient and consent was obtained. The risks, benefits and alternatives to therapy were discussed in detail. Specifically, the risks of lip deformity, changes in the oral aperture, infection, scarring, bleeding, prolonged wound healing, incomplete removal, allergy to anesthesia, nerve injury and recurrence were addressed. Prior to the procedure, the treatment site was clearly identified and confirmed by the patient. All components of Universal Protocol/PAUSE Rule completed.
Partial Purse String (Simple) Text: Given the location of the defect and the characteristics of the surrounding skin a simple purse string closure was deemed most appropriate.  Undermining was performed circumfirentially around the surgical defect.  A purse string suture was then placed and tightened. Wound tension only allowed a partial closure of the circular defect.
O-T Advancement Flap Text: The defect edges were debeveled with a #15 scalpel blade.  Given the location of the defect, shape of the defect and the proximity to free margins an O-T advancement flap was deemed the most appropriate reconstructive option. The flap was designed to fall along relaxed skin tension lines and/or free margins, incised, and elevated using blunt curved tissue scissors.  Hemostasis was achieved.  Interrupted buried vertical mattress sutures were employed to secure the flap in place.  Redundant cutaneous columns defined by the flap's movement were removed to the adipose layer using the triangulation technique and repaired in similar fashion.  Final epidermal approximation along the length of the flap was achieved using epidermal sutures.  The wound was stressed in various directions to ensure the adequacy of the closure and of hemostasis.  The flap edges appeared pink and well perfused.  Reconstruction was considered complete.
Burow's Advancement Flap Text: The defect edges were debeveled with a #15 scalpel blade.  Given the location of the defect and the proximity to free margins a Burow's advancement flap was deemed most appropriate.  Using a sterile surgical marker, the appropriate advancement flap was drawn incorporating the defect and placing the expected incisions within the relaxed skin tension lines where possible.    The area thus outlined was incised deep to adipose tissue with a #15 scalpel blade.  The skin margins were undermined to an appropriate distance in all directions utilizing iris scissors.
Deep Sutures: 5-0 Monocryl
Trilobed Flap Text: The defect edges were debeveled with a #15 scalpel blade.  Given the location of the defect and the proximity to free margins a trilobed flap was deemed most appropriate.  Using a sterile surgical marker, an appropriate trilobed flap drawn around the defect.    The area thus outlined was incised deep to adipose tissue with a #15 scalpel blade.  The skin margins were undermined to an appropriate distance in all directions utilizing iris scissors.
Double Island Pedicle Flap Text: The defect edges were debeveled with a #15 scalpel blade.  Given the location of the defect, shape of the defect and the proximity to free margins a double island pedicle advancement flap was deemed most appropriate.  Using a sterile surgical marker, an appropriate advancement flap was drawn incorporating the defect, outlining the appropriate donor tissue and placing the expected incisions within the relaxed skin tension lines where possible.    The area thus outlined was incised deep to adipose tissue with a #15 scalpel blade.  The skin margins were undermined to an appropriate distance in all directions around the primary defect and laterally outward around the island pedicle utilizing iris scissors.  There was minimal undermining beneath the pedicle flap.

## 2017-08-07 DIAGNOSIS — K21.9 GASTROESOPHAGEAL REFLUX DISEASE WITHOUT ESOPHAGITIS: ICD-10-CM

## 2017-08-07 NOTE — TELEPHONE ENCOUNTER
Disp Refills Start End DEBORAH   omeprazole (PRILOSEC) 20 MG capsule 90 capsule 1 11/7/2016  No   Sig: TAKE 1 CAPSULE EVERY DAY  30  TO  60  MINUTES BEFORE A MEAL       Last Office Visit with [facility], [facility] or [facility] prescribing provider: 07/24/2017                                             Refill per RN Protocol.       Pricila Madrid RN  [facility]

## 2017-08-08 ENCOUNTER — APPOINTMENT (OUTPATIENT)
Age: 67
Setting detail: DERMATOLOGY
End: 2017-08-20

## 2017-08-08 DIAGNOSIS — Z48.02 ENCOUNTER FOR REMOVAL OF SUTURES: ICD-10-CM

## 2017-08-08 PROCEDURE — OTHER MIPS QUALITY: OTHER

## 2017-08-08 PROCEDURE — OTHER SUTURE REMOVAL (GLOBAL PERIOD): OTHER

## 2017-08-08 PROCEDURE — OTHER COUNSELING: OTHER

## 2017-08-08 ASSESSMENT — LOCATION DETAILED DESCRIPTION DERM: LOCATION DETAILED: RIGHT CENTRAL LATERAL NECK

## 2017-08-08 ASSESSMENT — LOCATION SIMPLE DESCRIPTION DERM: LOCATION SIMPLE: NECK

## 2017-08-08 ASSESSMENT — LOCATION ZONE DERM: LOCATION ZONE: NECK

## 2017-08-08 NOTE — PROCEDURE: SUTURE REMOVAL (GLOBAL PERIOD)
Add 42977 Cpt? (Important Note: In 2017 The Use Of 10296 Is Being Tracked By Cms To Determine Future Global Period Reimbursement For Global Periods): no
Detail Level: Detailed
Body Location Override (Optional - Billing Will Still Be Based On Selected Body Map Location If Applicable): right infra-auricular neck

## 2017-08-08 NOTE — PROCEDURE: MIPS QUALITY
Quality 265: Biopsy Follow-Up: Biopsy results reviewed, communicated, tracked, and documented
Detail Level: Detailed
Quality 431: Preventive Care And Screening: Unhealthy Alcohol Use - Screening: Patient screened for unhealthy alcohol use using a single question and scores less than 2 times per year
Quality 226: Preventive Care And Screening: Tobacco Use: Screening And Cessation Intervention: Patient screened for tobacco and never smoked
Quality 130: Documentation Of Current Medications In The Medical Record: Current Medications Documented
Quality 110: Preventive Care And Screening: Influenza Immunization: Influenza Immunization previously received during influenza season

## 2017-10-12 ENCOUNTER — TELEPHONE (OUTPATIENT)
Dept: FAMILY MEDICINE | Facility: CLINIC | Age: 67
End: 2017-10-12

## 2017-10-12 NOTE — TELEPHONE ENCOUNTER
Date Forms was received: October 12, 2017    Forms received by: Fax    Last office visit: 7/24/17    Purpose of Form:  CPAP orders    When the form is due:  ASAP    How the form needs to be returned for patient:  Fax    Form currently placed  RL in box

## 2017-10-12 NOTE — TELEPHONE ENCOUNTER
Completed forms faxed back to [facility] at [phone].   Originals sent to be scanned.     Rain Aparicio

## 2017-10-17 ENCOUNTER — ALLIED HEALTH/NURSE VISIT (OUTPATIENT)
Dept: NURSING | Facility: CLINIC | Age: 67
End: 2017-10-17
Payer: COMMERCIAL

## 2017-10-17 DIAGNOSIS — Z23 NEED FOR PROPHYLACTIC VACCINATION AND INOCULATION AGAINST INFLUENZA: Primary | ICD-10-CM

## 2017-10-17 PROCEDURE — G0008 ADMIN INFLUENZA VIRUS VAC: HCPCS

## 2017-10-17 PROCEDURE — 90662 IIV NO PRSV INCREASED AG IM: CPT

## 2017-10-17 NOTE — PROGRESS NOTES
Injectable Influenza Immunization Documentation    1.  Is the person to be vaccinated sick today?   No    2. Does the person to be vaccinated have an allergy to a component   of the vaccine?  Don't know      3. Has the person to be vaccinated ever had a serious reaction   to influenza vaccine in the past?   No    4. Has the person to be vaccinated ever had Guillain-Barré syndrome?   No    Form completed by   Anisa Guy MA

## 2017-10-17 NOTE — MR AVS SNAPSHOT
After Visit Summary   10/17/2017    Edis Beltre    MRN: [id number]           Patient Information     Date Of Birth          1950        Visit Information        Provider Department      10/17/2017 9:30 AM KIRA HAMILTON/LPN [facility]        Today's Diagnoses     Need for prophylactic vaccination and inoculation against influenza    -  1       Follow-ups after your visit        Who to contact     If you have questions or need follow up information about today's clinic visit or your schedule please contact [facility] directly at [phone].  Normal or non-critical lab and imaging results will be communicated to you by [employer], letter or phone within 4 business days after the clinic has received the results. If you do not hear from us within 7 days, please contact the clinic through [employer] or phone. If you have a critical or abnormal lab result, we will notify you by phone as soon as possible.  Submit refill requests through [employer] or call your pharmacy and they will forward the refill request to us. Please allow 3 business days for your refill to be completed.          Additional Information About Your Visit        MyChart Information     [employer] gives you secure access to your electronic health record. If you see a primary care provider, you can also send messages to your care team and make appointments. If you have questions, please call your primary care clinic.  If you do not have a primary care provider, please call [phone] and they will assist you.        Care EveryWhere ID     This is your Care EveryWhere ID. This could be used by other organizations to access your [facility] medical records  [id number]         Blood Pressure from Last 3 Encounters:   07/24/17 120/80   11/01/16 138/76   04/19/16 140/72    Weight from Last 3 Encounters:   07/24/17 300 lb (136.1 kg)   11/01/16 296 lb (134.3 kg)   04/19/16 296 lb (134.3 kg)              We Performed the  Following     FLU VACCINE, INCREASED ANTIGEN, PRESV FREE, AGE 65+ [43085]     Vaccine Administration, Initial [71223]        Primary Care Provider Office Phone # Fax #    Pedro Brasher [phone]       [facility] [facility] [address]        Equal Access to Services     RÚAL KNOWLES : Hadii aad ku hadasho Soomaali, waaxda luqadaha, qaybta kaalmada adeegyada, waxmiguel sinha haycornelln adesherice alexandervaldojude prado. So [facility] [phone].    ATENCIÓN: Si habla español, tiene a medel disposición servicios gratuitos de asistencia lingüística. Llame al [phone].    We comply with applicable federal civil rights laws and [place] laws. We do not discriminate on the basis of race, color, national origin, age, disability, sex, sexual orientation, or gender identity.            Thank you!     Thank you for choosing [facility]  for your care. Our goal is always to provide you with excellent care. Hearing back from our patients is one way we can continue to improve our services. Please take a few minutes to complete the written survey that you may receive in the mail after your visit with us. Thank you!             Your Updated Medication List - Protect others around you: Learn how to safely use, store and throw away your medicines at www.disposemymeds.org.          This list is accurate as of: 10/17/17  9:47 AM.  Always use your most recent med list.                   Brand Name Dispense Instructions for use Diagnosis    aspirin 81 MG tablet     30 tablet    Take by mouth daily        Daily Multivitamin Tabs      1 TABLET DAILY        fexofenadine-pseudoePHEDrine  MG per 12 hr tablet    ALLEGRA-D    28 tablet    Take 1 tablet by mouth 2 times daily    Acute frontal sinusitis, recurrence not specified       fish oil-omega-3 fatty acids 1000 MG capsule      Take 1 g by mouth daily        naproxen 500 MG tablet    NAPROSYN    20 tablet    Take 1 tablet (500 mg) by mouth 2 times daily as  needed for moderate pain    Metatarsalgia of right foot       omeprazole 20 MG CR capsule    priLOSEC    90 capsule    TAKE 1 CAPSULE EVERY DAY  30  TO  60  MINUTES BEFORE A MEAL    Gastroesophageal reflux disease without esophagitis       order for DME     1 Device    Equipment being ordered: CPAP and all needed supplies.    Hypersomnia with sleep apnea, unspecified       pravastatin 20 MG tablet    PRAVACHOL    90 tablet    Take 1 tablet (20 mg) by mouth daily    Hyperlipidemia LDL goal <100

## 2017-12-18 ENCOUNTER — APPOINTMENT (OUTPATIENT)
Age: 67
Setting detail: DERMATOLOGY
End: 2018-01-20

## 2017-12-18 DIAGNOSIS — L57.0 ACTINIC KERATOSIS: ICD-10-CM

## 2017-12-18 DIAGNOSIS — L81.4 OTHER MELANIN HYPERPIGMENTATION: ICD-10-CM

## 2017-12-18 DIAGNOSIS — L82.0 INFLAMED SEBORRHEIC KERATOSIS: ICD-10-CM

## 2017-12-18 DIAGNOSIS — L91.8 OTHER HYPERTROPHIC DISORDERS OF THE SKIN: ICD-10-CM

## 2017-12-18 DIAGNOSIS — D18.0 HEMANGIOMA: ICD-10-CM

## 2017-12-18 DIAGNOSIS — Z71.89 OTHER SPECIFIED COUNSELING: ICD-10-CM

## 2017-12-18 DIAGNOSIS — L82.1 OTHER SEBORRHEIC KERATOSIS: ICD-10-CM

## 2017-12-18 DIAGNOSIS — D22 MELANOCYTIC NEVI: ICD-10-CM

## 2017-12-18 DIAGNOSIS — Z85.828 PERSONAL HISTORY OF OTHER MALIGNANT NEOPLASM OF SKIN: ICD-10-CM

## 2017-12-18 DIAGNOSIS — B07.8 OTHER VIRAL WARTS: ICD-10-CM

## 2017-12-18 PROBLEM — D18.01 HEMANGIOMA OF SKIN AND SUBCUTANEOUS TISSUE: Status: ACTIVE | Noted: 2017-12-18

## 2017-12-18 PROBLEM — D22.5 MELANOCYTIC NEVI OF TRUNK: Status: ACTIVE | Noted: 2017-12-18

## 2017-12-18 PROCEDURE — 11200 RMVL SKIN TAGS UP TO&INC 15: CPT | Mod: 59

## 2017-12-18 PROCEDURE — OTHER SUNSCREEN RECOMMENDATIONS: OTHER

## 2017-12-18 PROCEDURE — OTHER LIQUID NITROGEN: OTHER

## 2017-12-18 PROCEDURE — OTHER COUNSELING: OTHER

## 2017-12-18 PROCEDURE — 17000 DESTRUCT PREMALG LESION: CPT | Mod: 59

## 2017-12-18 PROCEDURE — 17003 DESTRUCT PREMALG LES 2-14: CPT

## 2017-12-18 PROCEDURE — 17110 DESTRUCT B9 LESION 1-14: CPT

## 2017-12-18 PROCEDURE — OTHER SKIN TAG REMOVAL: OTHER

## 2017-12-18 PROCEDURE — OTHER MIPS QUALITY: OTHER

## 2017-12-18 PROCEDURE — 99214 OFFICE O/P EST MOD 30 MIN: CPT | Mod: 25

## 2017-12-18 ASSESSMENT — LOCATION SIMPLE DESCRIPTION DERM
LOCATION SIMPLE: NECK
LOCATION SIMPLE: LEFT EYEBROW
LOCATION SIMPLE: RIGHT UPPER ARM
LOCATION SIMPLE: RIGHT EAR
LOCATION SIMPLE: SCALP
LOCATION SIMPLE: RIGHT CHEEK
LOCATION SIMPLE: GLABELLA
LOCATION SIMPLE: RIGHT FOREHEAD
LOCATION SIMPLE: RIGHT UPPER BACK
LOCATION SIMPLE: RIGHT 3RD TOE
LOCATION SIMPLE: UPPER BACK
LOCATION SIMPLE: RIGHT ANTERIOR NECK

## 2017-12-18 ASSESSMENT — LOCATION DETAILED DESCRIPTION DERM
LOCATION DETAILED: RIGHT MEDIAL UPPER BACK
LOCATION DETAILED: RIGHT MID-UPPER BACK
LOCATION DETAILED: LEFT CENTRAL EYEBROW
LOCATION DETAILED: RIGHT ANTERIOR DISTAL UPPER ARM
LOCATION DETAILED: RIGHT SUPERIOR PARIETAL SCALP
LOCATION DETAILED: RIGHT CENTRAL LATERAL NECK
LOCATION DETAILED: RIGHT INFERIOR LATERAL FOREHEAD
LOCATION DETAILED: SUPERIOR THORACIC SPINE
LOCATION DETAILED: RIGHT ANTIHELIX
LOCATION DETAILED: RIGHT LATERAL MALAR CHEEK
LOCATION DETAILED: RIGHT LATERAL 3RD TOE
LOCATION DETAILED: GLABELLA
LOCATION DETAILED: RIGHT CLAVICULAR NECK

## 2017-12-18 ASSESSMENT — LOCATION ZONE DERM
LOCATION ZONE: TRUNK
LOCATION ZONE: FACE
LOCATION ZONE: SCALP
LOCATION ZONE: ARM
LOCATION ZONE: TOE
LOCATION ZONE: EAR
LOCATION ZONE: NECK

## 2017-12-18 NOTE — PROCEDURE: SKIN TAG REMOVAL
Hemostasis: Electrocautery
Anesthesia Type: 1% lidocaine with epinephrine
Anesthesia Volume In Cc: 0.2
Medical Necessity Information: It is in your best interest to select a reason for this procedure from the list below. All of these items fulfill various CMS LCD requirements except the new and changing color options.
Consent: Written consent obtained and the risks of skin tag removal was reviewed with the patient including but not limited to bleeding, pigmentary change, infection, pain, and remote possibility of scarring.
Include Z78.9 (Other Specified Conditions Influencing Health Status) As An Associated Diagnosis?: No
Medical Necessity Clause: This procedure was medically necessary because the lesions that were treated were:
Detail Level: Detailed

## 2017-12-18 NOTE — PROCEDURE: LIQUID NITROGEN
Include Z78.9 (Other Specified Conditions Influencing Health Status) As An Associated Diagnosis?: No
Post-Care Instructions: I reviewed with the patient in detail post-care instructions. (Written instructions given) Patient is to wear sun protection, and avoid picking at any of the treated lesions. Pt may apply Vaseline to crusted or scabbing areas.
Detail Level: Detailed
Medical Necessity Information: It is in your best interest to select a reason for this procedure from the list below. All of these items fulfill various CMS LCD requirements except the new and changing color options.
Number Of Freeze-Thaw Cycles: 1 freeze-thaw cycle
Consent: The patient's consent was obtained including but not limited to risks of crusting, scabbing, blistering, scarring, darker or lighter pigmentary change, recurrence, incomplete removal and infection.
Duration Of Freeze Thaw-Cycle (Seconds): 10
Post-Care Instructions: I reviewed with the patient in detail post-care instructions. Patient is to wear sunprotection, and avoid picking at any of the treated lesions. Pt may apply Vaseline to crusted or scabbing areas.
Render Post-Care Instructions In Note?: yes
Total Number Of Aks Treated: 2
Number Of Freeze-Thaw Cycles: 2 freeze-thaw cycles
Duration Of Freeze Thaw-Cycle (Seconds): 15-20
Medical Necessity Clause: This procedure was medically necessary because the lesions that were treated were:

## 2017-12-18 NOTE — PROCEDURE: SUNSCREEN RECOMMENDATIONS
Detail Level: Detailed
General Sunscreen Counseling: I recommended a broad spectrum (UVA/B blocking) sunscreen with a SPF of 30 or higher.  I explained that SPF 30 sunscreens block approximately 97 percent of the sun's harmful ultraviolet rays.  Sunscreens should be applied at least 15 minutes prior to expected sun exposure and then every 2 hours after that as long as sun exposure continues. If swimming or exercising, sunscreen should be reapplied every 45 minutes to an hour after getting wet or sweating.  One ounce, or the equivalent of a shot glass full of sunscreen, is adequate to protect the skin not covered by a bathing suit. I also recommended a lip balm with a SPF 30 sunscreen as well. Sun protective clothing can be used in lieu of sunscreen, but must be worn the entire time you are exposed to the sun's rays.  Such clothing is woven of fibers with a chemical structure that absorbs or reflects ultraviolet radiation.  The best hats for sun protection have a full 360 degree brim.  Baseball style caps do not protect the ears or the back and sides of the neck and are inadequate for effective sun protection.
Products Recommended: The following products were discussed:\\nAnthelios - La Roche Poussey\\nCoppertone Sport\\nNeutrogenia sunscreens (many to choose from)\\nIntellishade - Maximo (tinted)\\nDaily SPF 33 Protectant - Tylor Tirado (non-tinted)

## 2018-01-17 ENCOUNTER — TELEPHONE (OUTPATIENT)
Dept: FAMILY MEDICINE | Facility: CLINIC | Age: 68
End: 2018-01-17

## 2018-01-17 ENCOUNTER — OFFICE VISIT (OUTPATIENT)
Dept: FAMILY MEDICINE | Facility: CLINIC | Age: 68
End: 2018-01-17
Payer: COMMERCIAL

## 2018-01-17 VITALS
BODY MASS INDEX: 43.82 KG/M2 | OXYGEN SATURATION: 96 % | TEMPERATURE: 98.7 F | HEART RATE: 85 BPM | SYSTOLIC BLOOD PRESSURE: 122 MMHG | DIASTOLIC BLOOD PRESSURE: 84 MMHG | WEIGHT: 313 LBS | HEIGHT: 71 IN

## 2018-01-17 DIAGNOSIS — K21.9 GASTROESOPHAGEAL REFLUX DISEASE, ESOPHAGITIS PRESENCE NOT SPECIFIED: ICD-10-CM

## 2018-01-17 DIAGNOSIS — E66.01 MORBID OBESITY DUE TO EXCESS CALORIES (H): Primary | ICD-10-CM

## 2018-01-17 DIAGNOSIS — R07.89 ATYPICAL CHEST PAIN: ICD-10-CM

## 2018-01-17 PROCEDURE — 99214 OFFICE O/P EST MOD 30 MIN: CPT | Performed by: PHYSICIAN ASSISTANT

## 2018-01-17 PROCEDURE — 93000 ELECTROCARDIOGRAM COMPLETE: CPT | Performed by: PHYSICIAN ASSISTANT

## 2018-01-17 RX ORDER — PANTOPRAZOLE SODIUM 40 MG/1
40 TABLET, DELAYED RELEASE ORAL
Qty: 30 TABLET | Refills: 1 | Status: SHIPPED | OUTPATIENT
Start: 2018-01-17 | End: 2018-01-17

## 2018-01-17 RX ORDER — PANTOPRAZOLE SODIUM 40 MG/1
40 TABLET, DELAYED RELEASE ORAL
Qty: 60 TABLET | Refills: 1 | Status: SHIPPED | OUTPATIENT
Start: 2018-01-17 | End: 2018-02-20

## 2018-01-17 NOTE — MR AVS SNAPSHOT
After Visit Summary   1/17/2018    Edis Beltre    MRN: [id number]           Patient Information     Date Of Birth          1950        Visit Information        Provider Department      1/17/2018 2:00 PM Delmis Berman PA-C [facility]  [place]        Today's Diagnoses     Gastroesophageal reflux disease, esophagitis presence not specified    -  1    Morbid obesity due to excess calories (H)        Atypical chest pain           Follow-ups after your visit        Additional Services     GASTROENTEROLOGY ADULT REF PROCEDURE ONLY       Last Lab Result: Creatinine (mg/dL)       Date                     Value                 02/26/2016               1.07             ----------  Body mass index is 43.65 kg/(m^2).      Patient will be contacted to schedule procedure.     Please be aware that coverage of these services is subject to the terms and limitations of your health insurance plan.  Call member services at your health plan with any benefit or coverage questions.  Any procedures must be performed at a [place] facility OR coordinated by your clinic's referral office.    Please bring the following with you to your appointment:    (1) Any X-Rays, CTs or MRIs which have been performed.  Contact the facility where they were done to arrange for  prior to your scheduled appointment.    (2) List of current medications   (3) This referral request   (4) Any documents/labs given to you for this referral                  Future tests that were ordered for you today     Open Future Orders        Priority Expected Expires Ordered    H Pylori antigen, stool Routine  2/16/2018 1/17/2018            Who to contact     If you have questions or need follow up information about today's clinic visit or your schedule please contact [facility] directly at [phone].  Normal or non-critical lab and imaging results will be communicated to you by MyChart, letter or phone within 4  "business days after the clinic has received the results. If you do not hear from us within 7 days, please contact the clinic through [employer] or phone. If you have a critical or abnormal lab result, we will notify you by phone as soon as possible.  Submit refill requests through [employer] or call your pharmacy and they will forward the refill request to us. Please allow 3 business days for your refill to be completed.          Additional Information About Your Visit        [employer]ona Information     [employer] gives you secure access to your electronic health record. If you see a primary care provider, you can also send messages to your care team and make appointments. If you have questions, please call your primary care clinic.  If you do not have a primary care provider, please call [phone] and they will assist you.        Care EveryWhere ID     This is your Care EveryWhere ID. This could be used by other organizations to access your [place] medical records  [id number]        Your Vitals Were     Pulse Temperature Height Pulse Oximetry BMI (Body Mass Index)       85 98.7  F (37.1  C) (Tympanic) 5' 11\" (1.803 m) 96% 43.65 kg/m2        Blood Pressure from Last 3 Encounters:   01/17/18 122/84   07/24/17 120/80   11/01/16 138/76    Weight from Last 3 Encounters:   01/17/18 (!) 313 lb (142 kg)   07/24/17 300 lb (136.1 kg)   11/01/16 296 lb (134.3 kg)              We Performed the Following     EKG 12-lead complete w/read - Clinics     GASTROENTEROLOGY ADULT REF PROCEDURE ONLY          Today's Medication Changes          These changes are accurate as of: 1/17/18  3:06 PM.  If you have any questions, ask your nurse or doctor.               Start taking these medicines.        Dose/Directions    pantoprazole 40 MG EC tablet   Commonly known as:  PROTONIX   Used for:  Gastroesophageal reflux disease, esophagitis presence not specified   Started by:  Delmis Berman PA-C        Dose:  40 mg   Take 1 tablet (40 mg) by " mouth 2 times daily (before meals) Take 30-60 minutes before a meal.   Quantity:  60 tablet   Refills:  1         Stop taking these medicines if you haven't already. Please contact your care team if you have questions.     omeprazole 20 MG CR capsule   Commonly known as:  priLOSEC   Stopped by:  Delmis Berman PA-C                Where to get your medicines      These medications were sent to [facility] [address] [place] [id number] [facility]  [address] [facility] [address]     Phone:  [phone]     pantoprazole 40 MG EC tablet                Primary Care Provider Office Phone # Fax #    Pedro Brasher [phone]       [address] [place] [address]        Equal Access to Services     DOROTHY KNOWLES : Hadii tyree mac hadasho Sok[place]ille, waaxda luqadaha, qaybta kaalmada adeegyada, edgardo sosa . So [facility] [phone].    ATENCIÓN: Si habla español, tiene a medel disposición servicios gratuitos de asistencia lingüística. [facility] [phone].    We comply with applicable federal civil rights laws and [place] laws. We do not discriminate on the basis of race, color, national origin, age, disability, sex, sexual orientation, or gender identity.            Thank you!     Thank you for choosing [facility]  for your care. Our goal is always to provide you with excellent care. Hearing back from our patients is one way we can continue to improve our services. Please take a few minutes to complete the written survey that you may receive in the mail after your visit with us. Thank you!             Your Updated Medication List - Protect others around you: Learn how to safely use, store and throw away your medicines at www.disposemymeds.org.          This list is accurate as of: 1/17/18  3:06 PM.  Always use your most recent med list.                   Brand Name Dispense Instructions for use Diagnosis    aspirin 81 MG tablet     30 tablet     Take by mouth daily        Daily Multivitamin Tabs      1 TABLET DAILY        fexofenadine-pseudoePHEDrine  MG per 12 hr tablet    ALLEGRA-D    28 tablet    Take 1 tablet by mouth 2 times daily    Acute frontal sinusitis, recurrence not specified       fish oil-omega-3 fatty acids 1000 MG capsule      Take 1 g by mouth daily        naproxen 500 MG tablet    NAPROSYN    20 tablet    Take 1 tablet (500 mg) by mouth 2 times daily as needed for moderate pain    Metatarsalgia of right foot       order for DME     1 Device    Equipment being ordered: CPAP and all needed supplies.    Hypersomnia with sleep apnea, unspecified       pantoprazole 40 MG EC tablet    PROTONIX    60 tablet    Take 1 tablet (40 mg) by mouth 2 times daily (before meals) Take 30-60 minutes before a meal.    Gastroesophageal reflux disease, esophagitis presence not specified       pravastatin 20 MG tablet    PRAVACHOL    90 tablet    Take 1 tablet (20 mg) by mouth daily    Hyperlipidemia LDL goal <100

## 2018-01-17 NOTE — PROGRESS NOTES
Results discussed directly with patient while patient was present. Any further details documented in the note.   Delmis Berman PA-C

## 2018-01-17 NOTE — TELEPHONE ENCOUNTER
GERD/Heartburn  Onset: number of days    Gas, in sternal area    Description:     Burning in chest: YES (pt denies left arm pain, CP, palpitations, dizziness)    Intensity: mild, moderate    Progression of Symptoms: intermittent    Accompanying Signs & Symptoms:  Does it feel like food gets stuck: no  Nausea: no  Vomiting (bloody?): no  Abdominal Pain: no  Black-Tarry stools: no:  Bloody stools: no    Feels better when they eat or have a BM.     History:   Previous ulcers: no    Precipitating factors:   Caffeine use: YES- every couple days  Alcohol use: YES- marginal, possibly every other day  NSAID/Aspirin use: YES- baby asa  Tobacco use: no  Worse with no particular food or drink.    Alleviating factors:  When the pt belches    Therapies Tried and outcome:omeprazole- not helping as much    Pt stays upright after eating.  Pt drinks moderate amount of water and gatorade.    Pt denies any large eating changes.     Scheduled pt for evaluation/appt today with LP. Advised to call the clinic or go to UC or ER with any changes in sx or severity. The patient indicates understanding of these issues and agrees with the plan.    Vanessa Sanders RN  [facility]

## 2018-01-17 NOTE — NURSING NOTE
"Chief Complaint   Patient presents with     Gastrophageal Reflux     acid reflux ~2 weeks. omeprazole not helping as much anymore.        Initial /84  Pulse 85  Temp 98.7  F (37.1  C) (Tympanic)  Ht 5' 11\" (1.803 m)  Wt (!) 313 lb (142 kg)  SpO2 96%  BMI 43.65 kg/m2 Estimated body mass index is 43.65 kg/(m^2) as calculated from the following:    Height as of this encounter: 5' 11\" (1.803 m).    Weight as of this encounter: 313 lb (142 kg).  Medication Reconciliation: complete    "

## 2018-01-17 NOTE — PROGRESS NOTES
SUBJECTIVE:   Edis Beltre is a 67 year old male who presents to clinic today for the following health issues:    GERD  Edis presents to clinic reporting a flare up of his GERD symptoms. He has traditionally treated his symptoms of omeprazole which had worked well up until 2 weeks ago. Since then he has been having increased reflux and epigastric pain. He denies any changes to his diet but has been trying to eat less in order to lose weight. His symptoms are aggravated when laying flat and are alleviated with walking and belching. In addition to his reflux symptoms he reports increased gassiness and flatulence and he has had increased bowel urgency in the mornings. His last EGD was in 2011. He denies nausea of vomiting with his symptoms.     Problem list and histories reviewed & adjusted, as indicated.  Additional history: as documented    Patient Active Problem List   Diagnosis     Gastroesophageal reflux disease without esophagitis     LAURA (obstructive sleep apnea)     FAMILY HX GI MALIGNANCY     Hyperlipidemia LDL goal <100     Morbid obesity due to excess calories (H)     Prediabetes     Advanced directives, counseling/discussion     S/P total knee arthroplasty     Premature ventricular contraction     Postprocedural pneumothorax - 2014     Metatarsalgia of right foot     Past Surgical History:   Procedure Laterality Date     ANKLE SURGERY      left     ARTHROPLASTY KNEE  4/14/2014    Procedure: Left Total Knee Arthroplasty ;  Surgeon: Gallito Willis MD;  Location: [facility] OR     BIOPSY      Skin doctor     CHOLECYSTECTOMY, LAPOROSCOPIC  2007     COLONOSCOPY       EYE SURGERY  2013    Cataract surgery on both eyes     H ABLATION PVC  2/26/16     STRIP VEIN      left leg     SURGICAL HISTORY OF -       L VEIN STRIPPING     SURGICAL HISTORY OF -       L MENISCUS- ARTHROSCOPY     SURGICAL HISTORY OF -       L ANKLE SURGERY      THORACIC SURGERY  5/2014    VATS , wedge resction and mechanical pleurodesis -[facility]  [place]       Social History   Substance Use Topics     Smoking status: Never Smoker     Smokeless tobacco: Never Used     Alcohol use Yes      Comment: Minimal     Family History   Problem Relation Age of Onset     Colon Cancer Father      Chronic Obstructive Pulmonary Disease Father      HEART DISEASE Mother      Has a pacemaker     Pacemaker Mother      Family History Negative Brother      Family History Negative Son      Genetic Disorder Paternal Grandmother      DIABETES No family hx of      Hypertension No family hx of      Prostate Cancer No family hx of      Coronary Artery Disease No family hx of          Current Outpatient Prescriptions   Medication Sig Dispense Refill     pantoprazole (PROTONIX) 40 MG EC tablet Take 1 tablet (40 mg) by mouth 2 times daily (before meals) Take 30-60 minutes before a meal. 60 tablet 1     naproxen (NAPROSYN) 500 MG tablet Take 1 tablet (500 mg) by mouth 2 times daily as needed for moderate pain 20 tablet 0     pravastatin (PRAVACHOL) 20 MG tablet Take 1 tablet (20 mg) by mouth daily 90 tablet 3     fexofenadine-pseudoePHEDrine (ALLEGRA-D)  MG per tablet Take 1 tablet by mouth 2 times daily 28 tablet 0     ORDER FOR DME Equipment being ordered: CPAP and all needed supplies. 1 Device 0     aspirin 81 MG tablet Take by mouth daily 30 tablet      fish oil-omega-3 fatty acids (FISH OIL) 1000 MG capsule Take 1 g by mouth daily        DAILY MULTIVITAMIN OR TABS 1 TABLET DAILY       Allergies   Allergen Reactions     Atorvastatin      Other reaction(s): Muscle Aches/Weakness       Reviewed and updated as needed this visit by clinical staff  Tobacco  Allergies  Meds  Problems  Med Hx  Surg Hx  Fam Hx  Soc Hx        Reviewed and updated as needed this visit by Provider  Tobacco  Allergies  Meds  Problems  Med Hx  Surg Hx  Fam Hx  Soc Hx        ROS:  Constitutional, HEENT, cardiovascular, pulmonary, GI, , musculoskeletal, neuro, skin, endocrine and  "psych systems are negative, except as otherwise noted.    This document serves as a record of the services and decisions personally performed and made by Delmis Berman PA-C. It was created on her behalf by Darien Sanchez, a trained medical scribe. The creation of this document is based on the provider's statements to the medical scribe.  Darien Sanchez 2:18 PM January 17, 2018    OBJECTIVE:   /84  Pulse 85  Temp 98.7  F (37.1  C) (Tympanic)  Ht 5' 11\" (1.803 m)  Wt (!) 313 lb (142 kg)  SpO2 96%  BMI 43.65 kg/m2  Body mass index is 43.65 kg/(m^2).  GENERAL: healthy, alert and no distress  RESP: lungs clear to auscultation - no rales, rhonchi or wheezes  CV: regular rate and rhythm, normal S1 S2, no S3 or S4, no murmur, click or rub, no peripheral edema and peripheral pulses strong  ABDOMEN: soft, nontender, no hepatosplenomegaly, no masses and bowel sounds normal  SKIN: no suspicious lesions or rashes  PSYCH: mentation appears normal, affect normal/bright    Diagnostic Test Results:  none         EKG Interpretation:      Interpreted by Delmis Berman  Time reviewed: 3:05 PM  Symptoms at time of EKG: Epigastric discomfort   Rhythm: Normal sinus   Rate: Normal  Axis: Normal  Ectopy: None  Conduction: Normal  ST Segments/ T Waves: No ST-T wave changes and No acute ischemic changes  Q Waves: None  Comparison to prior: No old EKG available    Clinical Impression: normal EKG      ASSESSMENT/PLAN:   Edis was seen today for gastrophageal reflux.    Diagnoses and all orders for this visit:    Gastroesophageal reflux disease, esophagitis presence not specified  Start pantoprazole to treat symptoms of GERD. Also recommended use of TUMS as needed for breakthrough reflux. Provided GI cocktail in clinic in an effort to relieve symptoms.  Markedly improved within 20 minutes. EGD order provided, patient will schedule. H pylori sample ordered for rule out. Will update patient with results  -     " GASTROENTEROLOGY ADULT REF PROCEDURE ONLY  -     H Pylori antigen, stool; Future  -     pantoprazole (PROTONIX) 40 MG EC tablet; Take 1 tablet (40 mg) by mouth 2 times daily (before meals) Take 30-60 minutes before a meal.    Morbid obesity due to excess calories (H)  Encouraged weight loss efforts with patient especially to help in alleviating his GERD symptoms.    Atypical chest pain  EKG ordered due to cardiac history and epigastric discomfort.   -     EKG 12-lead complete w/read - Clinics    The information in this document, created by the medical scribe for me, accurately reflects the services I personally performed and the decisions made by me. I have reviewed and approved this document for accuracy prior to leaving the patient care area.  January 17, 2018 2:18 PM    Delmis Berman PA-C  [facility] [place]

## 2018-01-18 DIAGNOSIS — K21.9 GASTROESOPHAGEAL REFLUX DISEASE, ESOPHAGITIS PRESENCE NOT SPECIFIED: ICD-10-CM

## 2018-01-18 PROCEDURE — 87338 HPYLORI STOOL AG IA: CPT | Performed by: PHYSICIAN ASSISTANT

## 2018-01-19 ENCOUNTER — TELEPHONE (OUTPATIENT)
Dept: FAMILY MEDICINE | Facility: CLINIC | Age: 68
End: 2018-01-19

## 2018-01-19 LAB
H PYLORI AG STL QL IA: NORMAL
SPECIMEN SOURCE: NORMAL

## 2018-01-19 NOTE — TELEPHONE ENCOUNTER
Patient calling re: Pantoprazole quantity. He received only 30 pills, and needs to take the medication 2x a day for 30 days. Would like to speak to the nurse for clarification. Please advise.  [phone] (home) none (work)  Thank you  Cuca Castillo

## 2018-01-19 NOTE — PROGRESS NOTES
Edis  Here are your recent results.  Great news!  You have no evidence for an infection of the gut.  If you have any questions please do not hesitate to contact our office via phone ([phone]) or [employer].    Delmis Berman, MS, PA-C  [facility]

## 2018-01-19 NOTE — TELEPHONE ENCOUNTER
The patient indicates understanding of these issues and agrees with the plan.  Pt will call back the pharmacy and have the pharmacy call us/fax us back or let the clinic know.    Vanessa Sanders RN  [facility]

## 2018-01-19 NOTE — TELEPHONE ENCOUNTER
Attempt # 1 to [phone].    Left non-detailed VM for patient to call back.    Pantoprazole was last ordered for #60 with 1 refill on 1/17/2018.   He should check with pharmacy as to why he only received #30.  It may be due to his insurance.      RHONDA Samuels, RN, PHN  [facility]

## 2018-01-24 ENCOUNTER — TELEPHONE (OUTPATIENT)
Dept: FAMILY MEDICINE | Facility: CLINIC | Age: 68
End: 2018-01-24

## 2018-01-24 NOTE — TELEPHONE ENCOUNTER
Name of caller: Edis  Relationship of Patient: Self    Reason for Call: Patient called to get Dr. Brasher's opinion on having a test done with Gastro. He stated he doesn't  Know if he really needs that, and he wants to know if he can skip it.     Best phone number to reach pt at is: [phone]  Ok to leave a message with medical info? Yes    Pharmacy preferred (if calling for a refill): [facility]

## 2018-01-24 NOTE — TELEPHONE ENCOUNTER
Please see message below   Please advise     Thank you     Sachi Saenz RN, BSN  [facility]

## 2018-01-30 NOTE — TELEPHONE ENCOUNTER
"RN spoke with patient.  He had appointment with Delmis Berman on 1/17/2018.  She increased dose of Protonix to 40 mg twice per day and ordered EGD.      He reports he has not been having GERD symptoms.  He reports increased gas, stomach \"gurgles\" a lot, and loose stools.  He said he has no blood in his stool or dark colored stools.    He feels the Protonix has helped but he is still having the \"gurgling\" in his stomach.      He wants to know if you think he needs the EGD?    Belkys Gautam, RHONDA, RN, N  [facility]) [phone]      "

## 2018-01-31 NOTE — TELEPHONE ENCOUNTER
Ok  Hold on the EGD if better - continue omeprazole for ~ 1 month and then wean off by taking every other day for 1 week and then every third day for 2 weeks then stop.  If symptoms worsen then and EGD would be recommended.  Make sure to avoid NSAIDS, caffeine, ETOH and other GERD causing foods.

## 2018-01-31 NOTE — TELEPHONE ENCOUNTER
Patient notified by phone.  He verbalized understanding and agreed plan.      Belkys Gautam, BS, RN, PHN  [facility]) [phone]

## 2018-02-20 ENCOUNTER — OFFICE VISIT (OUTPATIENT)
Dept: FAMILY MEDICINE | Facility: CLINIC | Age: 68
End: 2018-02-20
Payer: COMMERCIAL

## 2018-02-20 VITALS
WEIGHT: 300 LBS | BODY MASS INDEX: 42 KG/M2 | TEMPERATURE: 98.4 F | HEART RATE: 88 BPM | SYSTOLIC BLOOD PRESSURE: 120 MMHG | HEIGHT: 71 IN | DIASTOLIC BLOOD PRESSURE: 74 MMHG | OXYGEN SATURATION: 95 %

## 2018-02-20 DIAGNOSIS — K21.9 GASTROESOPHAGEAL REFLUX DISEASE WITHOUT ESOPHAGITIS: ICD-10-CM

## 2018-02-20 DIAGNOSIS — R19.7 DIARRHEA, UNSPECIFIED TYPE: Primary | ICD-10-CM

## 2018-02-20 DIAGNOSIS — Z91.81 AT RISK FOR FALLING: ICD-10-CM

## 2018-02-20 DIAGNOSIS — Z12.5 SCREENING FOR PROSTATE CANCER: ICD-10-CM

## 2018-02-20 DIAGNOSIS — Z11.59 NEED FOR HEPATITIS C SCREENING TEST: ICD-10-CM

## 2018-02-20 DIAGNOSIS — K21.9 GASTROESOPHAGEAL REFLUX DISEASE, ESOPHAGITIS PRESENCE NOT SPECIFIED: ICD-10-CM

## 2018-02-20 PROCEDURE — 99213 OFFICE O/P EST LOW 20 MIN: CPT | Performed by: FAMILY MEDICINE

## 2018-02-20 RX ORDER — PANTOPRAZOLE SODIUM 40 MG/1
40 TABLET, DELAYED RELEASE ORAL DAILY
Qty: 60 TABLET | Refills: 1 | Status: ON HOLD | COMMUNITY
Start: 2018-02-20 | End: 2018-02-23

## 2018-02-20 RX ORDER — CRANBERRY FRUIT EXTRACT 200 MG
1 CAPSULE ORAL DAILY
COMMUNITY
Start: 2018-02-20

## 2018-02-20 NOTE — PROGRESS NOTES
"  SUBJECTIVE:                                                    Edis Beltre is a 67 year old male who presents to clinic today for the following health issues:    Continuing stomach issues/ diarrhea  Onset: onging- pt is still eating same food     Description:   Middle of abdominal  Moves to the upper left    Intensity: moderate    Progression of Symptoms:  same    Accompanying Signs & Symptoms:  Gurgling and rumbling-after passing gas pt gets diarrhea right away    Previous history of similar problem:   This is on going for pt    Precipitating factors:   Worsened by: nothing makes it worse    Alleviating factors:  Improved by: protonix    Therapies Tried and outcome: given 40 mg protonix- pt is now taking 20 mg which helps    - Symptoms began 2 months ago; Pt was previously treated with \"GI cocktail\" by Delmis Berman, notes immediate relief but symptoms have now returned. He reports symptoms are sudden, \"sounding like a thunderstorm down there\" [in his abdomen] and then he has to go to the bathroom soon after with gas or loose BM's -- notes BM's at other times are formed but still soft. Pt maintains a healthy diet, as his wife has Type II DM, and he does not ingest more dairy than a glass of milk each day/every other day. He does drink 2 diet cokes/week, but does not feel there is any correlation with symptoms; has cut out alcohol all together. He is still taking 20 mg omeprazole, which provides some relief of symptoms. Edis denies any associated nausea or vomiting.         Problem list and histories reviewed & adjusted, as indicated.  Additional history: as documented    ROS:  Constitutional, HEENT, cardiovascular, pulmonary, GI, , musculoskeletal, neuro, skin, endocrine and psych systems are negative, except as otherwise noted.    This document serves as a record of the services and decisions personally performed and made by Pedro Brasher MD. It was created on his behalf by Gabriela Gray, a trained medical " "scribe. The creation of this document is based the provider's statements to the medical scribe.  Ginna Gabriela Gray 9:22 AM, February 20, 2018    OBJECTIVE:                                                    /74  Pulse 88  Temp 98.4  F (36.9  C) (Oral)  Ht 1.803 m (5' 11\")  Wt 136.1 kg (300 lb)  SpO2 95%  BMI 41.84 kg/m2 Body mass index is 41.84 kg/(m^2).   GENERAL: healthy, alert, well nourished, well hydrated, no distress  HENT: ear canals- normal; TMs- normal; Nose- normal; Mouth- no ulcers, no lesions  NECK: no tenderness, no adenopathy, no asymmetry, no masses, no stiffness; thyroid- normal to palpation  RESP: lungs clear to auscultation - no rales, no rhonchi, no wheezes  CV: regular rates and rhythm, normal S1 S2, no S3 or S4 and no murmur, no click or rub -  ABDOMEN: soft, no tenderness, no  hepatosplenomegaly, no masses, normal bowel sounds  MS: extremities- no gross deformities noted, no edema  SKIN: no suspicious lesions, no rashes  PSYCH: Alert and oriented times 3; speech- coherent , normal rate and volume; able to articulate logical thoughts, able to abstract reason, no tangential thoughts, no hallucinations or delusions, affect- normal    Diagnostic test results:  Results for orders placed or performed in visit on 01/18/18   H Pylori antigen, stool   Result Value Ref Range    Specimen Description Feces     H Pylori Antigen       Negative for Helicobacter pylori antigen by enzyme immunoassay. A negative result   indicates the absence of H. pylori antigen or that the level of antigen is below the level   of detection.            ASSESSMENT/PLAN:         Edis was seen today for abdominal pain and diarrhea.    Diagnoses and all orders for this visit:    Diarrhea, intermittent/Gastroesophageal reflux disease without esophagitis - Pt advised to remove dairy from his diet & monitor symptoms, add probiotic to regulate; Can continue omeprazole/protonix use as needed    Screening for prostate cancer " "- Pt will return for annual physical & labs    Need for hepatitis C screening test - Pt will return for annual physical & labs    At risk for falling - No concerns today        Risks, benefits and alternatives of treatments discussed. Plan agreed on.      Followup: Return to clinic prn.    Will call, return to clinic, or go to ED if worsening or symptoms not improving as discussed.    See patient instructions.       BMI:   Estimated body mass index is 41.84 kg/(m^2) as calculated from the following:    Height as of this encounter: 1.803 m (5' 11\").    Weight as of this encounter: 136.1 kg (300 lb).   Weight management plan: Discussed healthy diet and exercise guidelines and patient will follow up in 12 months in clinic to re-evaluate.        The information in this document, created by the medical scribe for me, accurately reflects the services I personally performed and the decisions made by me. I have reviewed and approved this document for accuracy prior to leaving the patient care area.  9:22 AM, 02/20/18        Chalino Brasher MD   Pager: [phone]    "

## 2018-02-20 NOTE — NURSING NOTE
"Chief Complaint   Patient presents with     Abdominal Pain     Diarrhea       Initial /74  Pulse 88  Temp 98.4  F (36.9  C) (Oral)  Ht 5' 11\" (1.803 m)  Wt 300 lb (136.1 kg)  SpO2 95%  BMI 41.84 kg/m2 Estimated body mass index is 41.84 kg/(m^2) as calculated from the following:    Height as of this encounter: 5' 11\" (1.803 m).    Weight as of this encounter: 300 lb (136.1 kg).  Medication Reconciliation: complete  "

## 2018-02-20 NOTE — PATIENT INSTRUCTIONS
Heartburn/GERD   What is heartburn?   Heartburn refers to the symptoms you feel when acids in your stomach flow back into the esophagus. (The esophagus is the tube that carries food from your throat to your stomach.) This backward movement of stomach acid is called reflux. The acid can burn and irritate the esophagus, throat, and vocal cords.   Heartburn is a common problem. Despite its name, it has nothing to do with the heart.   When you have heartburn often, you may have a condition called gastroesophageal reflux disease, or GERD.   How does it occur?   At the bottom of the esophagus there is a ring of muscle called a sphincter. It acts like a valve. When you swallow food, the sphincter opens to let the food pass into the stomach. The ring then closes to keep the stomach contents from going back into the esophagus. If the sphincter is weak or too relaxed, stomach acid and food flow backward into the esophagus. Because the esophagus does not have the protective lining that the stomach has, the acid causes pain.   The sphincter muscle sometimes does not work properly if:   You are overweight.   You are pregnant.   You have a hiatal hernia (a condition in which part of the stomach protrudes through the diaphragm into the chest).   You eat too much.   You lie down soon after eating.   You wear tight clothes that push on your stomach.   Foods that may make heartburn worse are:   foods high in fat   sugar   chocolate   peppermint   onions   citrus foods such as orange juice   tomato-based foods   spicy foods   coffee and other drinks with caffeine, such as tea and christine   alcohol.   Heartburn can also be made worse by:   taking certain medicines, such as aspirin   smoking cigarettes.   Anyone can have an attack of heartburn from overeating or eating foods that are high in acid. Most of the time heartburn is mild and lasts for a short time. There is usually not a problem when heartburn occurs just once in a  while. You should see your healthcare provider if:   You have heartburn nearly every day for 2 weeks.   The heartburn comes back when the antacid wears off.   Heartburn wakes you up at night.   What are the symptoms?   The main symptom of heartburn is a burning pain in the lower chest, usually close to the bottom of the breastbone. Other symptoms you may have are:   acid or sour taste in your mouth   belching   a feeling of bloating or fullness in the stomach.   These symptoms tend to happen after very large meals and especially with activity such as bending or lifting after meals. The symptoms may be made worse by lying down or by wearing tight clothing.   Heartburn is very common during the last few months of pregnancy. The weight of the baby pushes on the stomach and can cause the sphincter to relax and let acid to flow back into the esophagus.   How is it diagnosed?   Usually heartburn can be diagnosed from your medical history.   If there is any question about the diagnosis, you may have the following tests to check for ulcers or other problems that might cause your symptoms:   barium swallow X-ray study of the esophagus   complete upper GI (gastrointestinal) barium X-ray study of the esophagus, stomach, and upper intestine   endoscopy, a procedure in which a thin flexible tube with a tiny camera is placed in your mouth and down into your stomach so your provider can see your esophagus and stomach.   How is it treated?   To help reduce the symptoms of heartburn you can:   Try not to put a lot of pressure on the sphincter muscle. Eating light meals and wearing loose clothing will help.   Lose weight if you are overweight.   Take nonprescription antacids (tablets or liquid) after meals and at bedtime.   Raise the head of your bed or use more than one pillow so your head is higher than your stomach. This may allow gravity to help keep food from backing up.   If you find that certain foods or drinks seem to cause  your symptoms or make them worse, avoid those foods.   If the simple measures described above do not relieve the symptoms, your healthcare provider may prescribe medicine. The prescription medicines help reduce stomach acid. They also help stomach emptying. A very few people who are not helped with medicines may need surgery.   Get emergency care if the following symptoms occur with the heartburn and do not go away within 15 minutes of treatment for heartburn: shortness of breath; sweating; light-headedness, weakness; or jaw, arm, back, or chest pain.   How long will the effects last?   Heartburn symptoms are usually relieved by treatment in just a few hours or less. If you are having heartburn every day, starting treatment will usually relieve the symptoms in a few days. However, the symptoms may come back from time to time, especially if you gain weight.   Heartburn can sometimes make asthma worse. If you have asthma, preventing or controlling heartburn may help control your asthma symptoms.   How can I help prevent heartburn?   The best prevention is to:   Keep a healthy weight. Lose weight if you are overweight.   Sleep with your head elevated at least 4 to 6 inches. (It's usually most comfortable to put the head of your bed on blocks.)   It may also help if you:   Wait an hour or longer after eating before you lie down. If you have to lie down after a meal, lie on your left side. Keep your head and shoulders slightly higher than the rest of your body. It's best to not eat for 2 to 3 hours before you go to bed.   Eat smaller, more frequent meals.   Avoid wearing tight clothing or belts.   Don't smoke. Smoking relaxes the sphincter leading to your stomach.   Avoid foods and other things that seem to cause heartburn or make it worse.   Developed by [employer].   Published by [employer].   Last modified: 2009-01-14   Last reviewed: 2008-12-02

## 2018-02-20 NOTE — MR AVS SNAPSHOT
After Visit Summary   2/20/2018    Edis Beltre    MRN: [id number]           Patient Information     Date Of Birth          1950        Visit Information        Provider Department      2/20/2018 9:00 AM Pedro Brasher MD [facility] [place]        Today's Diagnoses     Diarrhea, intermittent    -  1    Gastroesophageal reflux disease without esophagitis        Screening for prostate cancer        Need for hepatitis C screening test        At risk for falling        Gastroesophageal reflux disease, esophagitis presence not specified          Care Instructions                Heartburn/GERD   What is heartburn?   Heartburn refers to the symptoms you feel when acids in your stomach flow back into the esophagus. (The esophagus is the tube that carries food from your throat to your stomach.) This backward movement of stomach acid is called reflux. The acid can burn and irritate the esophagus, throat, and vocal cords.   Heartburn is a common problem. Despite its name, it has nothing to do with the heart.   When you have heartburn often, you may have a condition called gastroesophageal reflux disease, or GERD.   How does it occur?   At the bottom of the esophagus there is a ring of muscle called a sphincter. It acts like a valve. When you swallow food, the sphincter opens to let the food pass into the stomach. The ring then closes to keep the stomach contents from going back into the esophagus. If the sphincter is weak or too relaxed, stomach acid and food flow backward into the esophagus. Because the esophagus does not have the protective lining that the stomach has, the acid causes pain.   The sphincter muscle sometimes does not work properly if:   You are overweight.   You are pregnant.   You have a hiatal hernia (a condition in which part of the stomach protrudes through the diaphragm into the chest).   You eat too much.   You lie down soon after eating.   You wear tight clothes that  push on your stomach.   Foods that may make heartburn worse are:   foods high in fat   sugar   chocolate   peppermint   onions   citrus foods such as orange juice   tomato-based foods   spicy foods   coffee and other drinks with caffeine, such as tea and christine   alcohol.   Heartburn can also be made worse by:   taking certain medicines, such as aspirin   smoking cigarettes.   Anyone can have an attack of heartburn from overeating or eating foods that are high in acid. Most of the time heartburn is mild and lasts for a short time. There is usually not a problem when heartburn occurs just once in a while. You should see your healthcare provider if:   You have heartburn nearly every day for 2 weeks.   The heartburn comes back when the antacid wears off.   Heartburn wakes you up at night.   What are the symptoms?   The main symptom of heartburn is a burning pain in the lower chest, usually close to the bottom of the breastbone. Other symptoms you may have are:   acid or sour taste in your mouth   belching   a feeling of bloating or fullness in the stomach.   These symptoms tend to happen after very large meals and especially with activity such as bending or lifting after meals. The symptoms may be made worse by lying down or by wearing tight clothing.   Heartburn is very common during the last few months of pregnancy. The weight of the baby pushes on the stomach and can cause the sphincter to relax and let acid to flow back into the esophagus.   How is it diagnosed?   Usually heartburn can be diagnosed from your medical history.   If there is any question about the diagnosis, you may have the following tests to check for ulcers or other problems that might cause your symptoms:   barium swallow X-ray study of the esophagus   complete upper GI (gastrointestinal) barium X-ray study of the esophagus, stomach, and upper intestine   endoscopy, a procedure in which a thin flexible tube with a tiny camera is placed in your  mouth and down into your stomach so your provider can see your esophagus and stomach.   How is it treated?   To help reduce the symptoms of heartburn you can:   Try not to put a lot of pressure on the sphincter muscle. Eating light meals and wearing loose clothing will help.   Lose weight if you are overweight.   Take nonprescription antacids (tablets or liquid) after meals and at bedtime.   Raise the head of your bed or use more than one pillow so your head is higher than your stomach. This may allow gravity to help keep food from backing up.   If you find that certain foods or drinks seem to cause your symptoms or make them worse, avoid those foods.   If the simple measures described above do not relieve the symptoms, your healthcare provider may prescribe medicine. The prescription medicines help reduce stomach acid. They also help stomach emptying. A very few people who are not helped with medicines may need surgery.   Get emergency care if the following symptoms occur with the heartburn and do not go away within 15 minutes of treatment for heartburn: shortness of breath; sweating; light-headedness, weakness; or jaw, arm, back, or chest pain.   How long will the effects last?   Heartburn symptoms are usually relieved by treatment in just a few hours or less. If you are having heartburn every day, starting treatment will usually relieve the symptoms in a few days. However, the symptoms may come back from time to time, especially if you gain weight.   Heartburn can sometimes make asthma worse. If you have asthma, preventing or controlling heartburn may help control your asthma symptoms.   How can I help prevent heartburn?   The best prevention is to:   Keep a healthy weight. Lose weight if you are overweight.   Sleep with your head elevated at least 4 to 6 inches. (It's usually most comfortable to put the head of your bed on blocks.)   It may also help if you:   Wait an hour or longer after eating before you lie  down. If you have to lie down after a meal, lie on your left side. Keep your head and shoulders slightly higher than the rest of your body. It's best to not eat for 2 to 3 hours before you go to bed.   Eat smaller, more frequent meals.   Avoid wearing tight clothing or belts.   Don't smoke. Smoking relaxes the sphincter leading to your stomach.   Avoid foods and other things that seem to cause heartburn or make it worse.   Developed by [employer].   Published by [employer].   Last modified: 2009-01-14   Last reviewed: 2008-12-02             Follow-ups after your visit        Who to contact     If you have questions or need follow up information about today's clinic visit or your schedule please contact [facility] directly at [phone].  Normal or non-critical lab and imaging results will be communicated to you by MyChart, letter or phone within 4 business days after the clinic has received the results. If you do not hear from us within 7 days, please contact the clinic through [employer]hart or phone. If you have a critical or abnormal lab result, we will notify you by phone as soon as possible.  Submit refill requests through [employer] or call your pharmacy and they will forward the refill request to us. Please allow 3 business days for your refill to be completed.          Additional Information About Your Visit        [employer] Information     [employer] gives you secure access to your electronic health record. If you see a primary care provider, you can also send messages to your care team and make appointments. If you have questions, please call your primary care clinic.  If you do not have a primary care provider, please call [phone] and they will assist you.        Care EveryWhere ID     This is your Care EveryWhere ID. This could be used by other organizations to access your [facility] medical records  [id number]        Your Vitals Were     Pulse Temperature Height Pulse Oximetry BMI (Body Mass  "Index)       88 98.4  F (36.9  C) (Oral) 5' 11\" (1.803 m) 95% 41.84 kg/m2        Blood Pressure from Last 3 Encounters:   02/20/18 120/74   01/17/18 122/84   07/24/17 120/80    Weight from Last 3 Encounters:   02/20/18 300 lb (136.1 kg)   01/17/18 (!) 313 lb (142 kg)   07/24/17 300 lb (136.1 kg)              Today, you had the following     No orders found for display         Today's Medication Changes          These changes are accurate as of 2/20/18  9:38 AM.  If you have any questions, ask your nurse or doctor.               Start taking these medicines.        Dose/Directions    ACIDOPHILUS PROBIOTIC BLEND Caps   Used for:  Diarrhea, unspecified type   Started by:  Pedro Brasher MD        Dose:  1 capsule   Take 1 capsule by mouth daily   Refills:  0         These medicines have changed or have updated prescriptions.        Dose/Directions    PROTONIX 40 MG EC tablet   This may have changed:  when to take this   Used for:  Gastroesophageal reflux disease, esophagitis presence not specified   Generic drug:  pantoprazole   Changed by:  Pedro Brasher MD        Dose:  40 mg   Take 1 tablet (40 mg) by mouth daily Take 30-60 minutes before a meal.   Quantity:  60 tablet   Refills:  1         Stop taking these medicines if you haven't already. Please contact your care team if you have questions.     naproxen 500 MG tablet   Commonly known as:  NAPROSYN   Stopped by:  Pedro Brasher MD                Where to get your medicines      Some of these will need a paper prescription and others can be bought over the counter.  Ask your nurse if you have questions.     You don't need a prescription for these medications     ACIDOPHILUS PROBIOTIC BLEND Caps                Primary Care Provider Office Phone # Fax #    Pedro Brasher [phone] [address] [facility] [address]        Equal Access to Services     [facility] BENSON AH: Ricardo Jones, ivone soria, dona " edgardo bloomsherice sosa ahRakesh Marie [facility] [phone].    ATENCIÓN: Si rachel taylor, tiene a medel disposición servicios gratuitos de asistencia lingüística. Chichi al [phone].    We comply with applicable federal civil rights laws and [place] laws. We do not discriminate on the basis of race, color, national origin, age, disability, sex, sexual orientation, or gender identity.            Thank you!     Thank you for choosing [facility]  for your care. Our goal is always to provide you with excellent care. Hearing back from our patients is one way we can continue to improve our services. Please take a few minutes to complete the written survey that you may receive in the mail after your visit with us. Thank you!             Your Updated Medication List - Protect others around you: Learn how to safely use, store and throw away your medicines at www.disposemymeds.org.          This list is accurate as of 2/20/18  9:38 AM.  Always use your most recent med list.                   Brand Name Dispense Instructions for use Diagnosis    ACIDOPHILUS PROBIOTIC BLEND Caps      Take 1 capsule by mouth daily    Diarrhea, unspecified type       aspirin 81 MG tablet     30 tablet    Take by mouth daily        Daily Multivitamin Tabs      1 TABLET DAILY        fexofenadine-pseudoePHEDrine  MG per 12 hr tablet    ALLEGRA-D    28 tablet    Take 1 tablet by mouth 2 times daily    Acute frontal sinusitis, recurrence not specified       fish oil-omega-3 fatty acids 1000 MG capsule      Take 1 g by mouth daily        order for DME     1 Device    Equipment being ordered: CPAP and all needed supplies.    Hypersomnia with sleep apnea, unspecified       pravastatin 20 MG tablet    PRAVACHOL    90 tablet    Take 1 tablet (20 mg) by mouth daily    Hyperlipidemia LDL goal <100       PROTONIX 40 MG EC tablet   Generic drug:  pantoprazole     60 tablet    Take 1 tablet (40 mg) by mouth  daily Take 30-60 minutes before a meal.    Gastroesophageal reflux disease, esophagitis presence not specified

## 2018-02-23 ENCOUNTER — APPOINTMENT (OUTPATIENT)
Dept: CARDIOLOGY | Facility: CLINIC | Age: 68
End: 2018-02-23
Attending: INTERNAL MEDICINE
Payer: MEDICARE

## 2018-02-23 ENCOUNTER — HOSPITAL ENCOUNTER (OUTPATIENT)
Facility: CLINIC | Age: 68
Setting detail: OBSERVATION
Discharge: HOME | End: 2018-02-23
Attending: EMERGENCY MEDICINE | Admitting: INTERNAL MEDICINE
Payer: MEDICARE

## 2018-02-23 ENCOUNTER — NURSE TRIAGE (OUTPATIENT)
Dept: NURSING | Facility: CLINIC | Age: 68
End: 2018-02-23

## 2018-02-23 VITALS
DIASTOLIC BLOOD PRESSURE: 75 MMHG | RESPIRATION RATE: 18 BRPM | HEART RATE: 93 BPM | SYSTOLIC BLOOD PRESSURE: 149 MMHG | BODY MASS INDEX: 42.28 KG/M2 | WEIGHT: 302 LBS | OXYGEN SATURATION: 92 % | TEMPERATURE: 97.2 F | HEIGHT: 71 IN

## 2018-02-23 DIAGNOSIS — J01.90 ACUTE NON-RECURRENT SINUSITIS, UNSPECIFIED LOCATION: Primary | ICD-10-CM

## 2018-02-23 DIAGNOSIS — R68.84 JAW PAIN: ICD-10-CM

## 2018-02-23 LAB
ANION GAP SERPL CALCULATED.3IONS-SCNC: 7 MMOL/L (ref 3–14)
BASOPHILS # BLD AUTO: 0 10E9/L (ref 0–0.2)
BASOPHILS NFR BLD AUTO: 0.4 %
BUN SERPL-MCNC: 14 MG/DL (ref 7–30)
CALCIUM SERPL-MCNC: 8.1 MG/DL (ref 8.5–10.1)
CHLORIDE SERPL-SCNC: 106 MMOL/L (ref 94–109)
CO2 SERPL-SCNC: 24 MMOL/L (ref 20–32)
CREAT SERPL-MCNC: 1.14 MG/DL (ref 0.66–1.25)
DIFFERENTIAL METHOD BLD: NORMAL
EOSINOPHIL # BLD AUTO: 0.6 10E9/L (ref 0–0.7)
EOSINOPHIL NFR BLD AUTO: 6.7 %
ERYTHROCYTE [DISTWIDTH] IN BLOOD BY AUTOMATED COUNT: 13.2 % (ref 10–15)
GFR SERPL CREATININE-BSD FRML MDRD: 64 ML/MIN/1.7M2
GLUCOSE SERPL-MCNC: 138 MG/DL (ref 70–99)
HCT VFR BLD AUTO: 46 % (ref 40–53)
HGB BLD-MCNC: 15.6 G/DL (ref 13.3–17.7)
IMM GRANULOCYTES # BLD: 0 10E9/L (ref 0–0.4)
IMM GRANULOCYTES NFR BLD: 0.2 %
INTERPRETATION ECG - MUSE: NORMAL
LYMPHOCYTES # BLD AUTO: 1.1 10E9/L (ref 0.8–5.3)
LYMPHOCYTES NFR BLD AUTO: 12.9 %
MCH RBC QN AUTO: 31.4 PG (ref 26.5–33)
MCHC RBC AUTO-ENTMCNC: 33.9 G/DL (ref 31.5–36.5)
MCV RBC AUTO: 93 FL (ref 78–100)
MONOCYTES # BLD AUTO: 1.1 10E9/L (ref 0–1.3)
MONOCYTES NFR BLD AUTO: 12.9 %
NEUTROPHILS # BLD AUTO: 5.5 10E9/L (ref 1.6–8.3)
NEUTROPHILS NFR BLD AUTO: 66.9 %
NRBC # BLD AUTO: 0 10*3/UL
NRBC BLD AUTO-RTO: 0 /100
PLATELET # BLD AUTO: 254 10E9/L (ref 150–450)
POTASSIUM SERPL-SCNC: 3.8 MMOL/L (ref 3.4–5.3)
RBC # BLD AUTO: 4.97 10E12/L (ref 4.4–5.9)
SODIUM SERPL-SCNC: 137 MMOL/L (ref 133–144)
TROPONIN I SERPL-MCNC: <0.015 UG/L (ref 0–0.04)
TROPONIN I SERPL-MCNC: <0.015 UG/L (ref 0–0.04)
WBC # BLD AUTO: 8.3 10E9/L (ref 4–11)

## 2018-02-23 PROCEDURE — 80048 BASIC METABOLIC PNL TOTAL CA: CPT | Performed by: EMERGENCY MEDICINE

## 2018-02-23 PROCEDURE — 85025 COMPLETE CBC W/AUTO DIFF WBC: CPT | Performed by: EMERGENCY MEDICINE

## 2018-02-23 PROCEDURE — 25500064 ZZH RX 255 OP 636: Performed by: INTERNAL MEDICINE

## 2018-02-23 PROCEDURE — 99285 EMERGENCY DEPT VISIT HI MDM: CPT | Mod: 25

## 2018-02-23 PROCEDURE — 25000132 ZZH RX MED GY IP 250 OP 250 PS 637: Mod: GY | Performed by: EMERGENCY MEDICINE

## 2018-02-23 PROCEDURE — 93321 DOPPLER ECHO F-UP/LMTD STD: CPT | Mod: TC

## 2018-02-23 PROCEDURE — 25000128 H RX IP 250 OP 636: Performed by: EMERGENCY MEDICINE

## 2018-02-23 PROCEDURE — 93350 STRESS TTE ONLY: CPT | Mod: 26 | Performed by: INTERNAL MEDICINE

## 2018-02-23 PROCEDURE — 93321 DOPPLER ECHO F-UP/LMTD STD: CPT | Mod: 26 | Performed by: INTERNAL MEDICINE

## 2018-02-23 PROCEDURE — 99235 HOSP IP/OBS SAME DATE MOD 70: CPT | Performed by: INTERNAL MEDICINE

## 2018-02-23 PROCEDURE — 96374 THER/PROPH/DIAG INJ IV PUSH: CPT

## 2018-02-23 PROCEDURE — G0378 HOSPITAL OBSERVATION PER HR: HCPCS

## 2018-02-23 PROCEDURE — 93325 DOPPLER ECHO COLOR FLOW MAPG: CPT | Mod: 26 | Performed by: INTERNAL MEDICINE

## 2018-02-23 PROCEDURE — A9270 NON-COVERED ITEM OR SERVICE: HCPCS | Mod: GY | Performed by: EMERGENCY MEDICINE

## 2018-02-23 PROCEDURE — 84484 ASSAY OF TROPONIN QUANT: CPT | Performed by: EMERGENCY MEDICINE

## 2018-02-23 PROCEDURE — 93016 CV STRESS TEST SUPVJ ONLY: CPT | Performed by: INTERNAL MEDICINE

## 2018-02-23 PROCEDURE — 36415 COLL VENOUS BLD VENIPUNCTURE: CPT | Performed by: INTERNAL MEDICINE

## 2018-02-23 PROCEDURE — 93018 CV STRESS TEST I&R ONLY: CPT | Performed by: INTERNAL MEDICINE

## 2018-02-23 PROCEDURE — A9270 NON-COVERED ITEM OR SERVICE: HCPCS | Mod: GY | Performed by: INTERNAL MEDICINE

## 2018-02-23 PROCEDURE — 25000132 ZZH RX MED GY IP 250 OP 250 PS 637: Mod: GY | Performed by: INTERNAL MEDICINE

## 2018-02-23 PROCEDURE — 93005 ELECTROCARDIOGRAM TRACING: CPT

## 2018-02-23 PROCEDURE — 84484 ASSAY OF TROPONIN QUANT: CPT | Performed by: INTERNAL MEDICINE

## 2018-02-23 PROCEDURE — 40000065 ZZH STATISTIC EKG NON-CHARGEABLE

## 2018-02-23 RX ORDER — ACETAMINOPHEN 325 MG/1
650 TABLET ORAL EVERY 4 HOURS PRN
Status: DISCONTINUED | OUTPATIENT
Start: 2018-02-23 | End: 2018-02-23 | Stop reason: HOSPADM

## 2018-02-23 RX ORDER — NITROGLYCERIN 0.4 MG/1
0.4 TABLET SUBLINGUAL EVERY 5 MIN PRN
Status: DISCONTINUED | OUTPATIENT
Start: 2018-02-23 | End: 2018-02-23 | Stop reason: HOSPADM

## 2018-02-23 RX ORDER — PANTOPRAZOLE SODIUM 40 MG/1
40 TABLET, DELAYED RELEASE ORAL DAILY PRN
COMMUNITY
End: 2018-07-03

## 2018-02-23 RX ORDER — KETOROLAC TROMETHAMINE 15 MG/ML
15 INJECTION, SOLUTION INTRAMUSCULAR; INTRAVENOUS ONCE
Status: COMPLETED | OUTPATIENT
Start: 2018-02-23 | End: 2018-02-23

## 2018-02-23 RX ORDER — ACETAMINOPHEN 650 MG/1
650 SUPPOSITORY RECTAL EVERY 4 HOURS PRN
Status: DISCONTINUED | OUTPATIENT
Start: 2018-02-23 | End: 2018-02-23 | Stop reason: HOSPADM

## 2018-02-23 RX ORDER — ACETAMINOPHEN 650 MG/1
650 SUPPOSITORY RECTAL EVERY 4 HOURS PRN
Status: DISCONTINUED | OUTPATIENT
Start: 2018-02-23 | End: 2018-02-23

## 2018-02-23 RX ORDER — NALOXONE HYDROCHLORIDE 0.4 MG/ML
.1-.4 INJECTION, SOLUTION INTRAMUSCULAR; INTRAVENOUS; SUBCUTANEOUS
Status: DISCONTINUED | OUTPATIENT
Start: 2018-02-23 | End: 2018-02-23 | Stop reason: HOSPADM

## 2018-02-23 RX ORDER — ONDANSETRON 4 MG/1
4 TABLET, ORALLY DISINTEGRATING ORAL EVERY 6 HOURS PRN
Status: DISCONTINUED | OUTPATIENT
Start: 2018-02-23 | End: 2018-02-23 | Stop reason: HOSPADM

## 2018-02-23 RX ORDER — ONDANSETRON 2 MG/ML
4 INJECTION INTRAMUSCULAR; INTRAVENOUS EVERY 6 HOURS PRN
Status: DISCONTINUED | OUTPATIENT
Start: 2018-02-23 | End: 2018-02-23 | Stop reason: HOSPADM

## 2018-02-23 RX ORDER — ASPIRIN 81 MG/1
81 TABLET ORAL DAILY
Status: DISCONTINUED | OUTPATIENT
Start: 2018-02-24 | End: 2018-02-23 | Stop reason: HOSPADM

## 2018-02-23 RX ORDER — CHLORAL HYDRATE 500 MG
2 CAPSULE ORAL DAILY
COMMUNITY

## 2018-02-23 RX ORDER — LIDOCAINE 40 MG/G
2.5 CREAM TOPICAL
Status: DISCONTINUED | OUTPATIENT
Start: 2018-02-23 | End: 2018-02-23 | Stop reason: HOSPADM

## 2018-02-23 RX ORDER — NALOXONE HYDROCHLORIDE 0.4 MG/ML
.1-.4 INJECTION, SOLUTION INTRAMUSCULAR; INTRAVENOUS; SUBCUTANEOUS
Status: DISCONTINUED | OUTPATIENT
Start: 2018-02-23 | End: 2018-02-23

## 2018-02-23 RX ORDER — ALUMINA, MAGNESIA, AND SIMETHICONE 2400; 2400; 240 MG/30ML; MG/30ML; MG/30ML
30 SUSPENSION ORAL EVERY 4 HOURS PRN
Status: DISCONTINUED | OUTPATIENT
Start: 2018-02-23 | End: 2018-02-23 | Stop reason: HOSPADM

## 2018-02-23 RX ORDER — HYDROMORPHONE HYDROCHLORIDE 1 MG/ML
0.5 INJECTION, SOLUTION INTRAMUSCULAR; INTRAVENOUS; SUBCUTANEOUS
Status: DISCONTINUED | OUTPATIENT
Start: 2018-02-23 | End: 2018-02-23 | Stop reason: HOSPADM

## 2018-02-23 RX ORDER — ASPIRIN 81 MG/1
162 TABLET, CHEWABLE ORAL ONCE
Status: DISCONTINUED | OUTPATIENT
Start: 2018-02-23 | End: 2018-02-23 | Stop reason: HOSPADM

## 2018-02-23 RX ORDER — OXYCODONE HYDROCHLORIDE 5 MG/1
5-10 TABLET ORAL
Status: DISCONTINUED | OUTPATIENT
Start: 2018-02-23 | End: 2018-02-23 | Stop reason: HOSPADM

## 2018-02-23 RX ORDER — GUAIFENESIN 600 MG/1
600 TABLET, EXTENDED RELEASE ORAL 2 TIMES DAILY
Status: DISCONTINUED | OUTPATIENT
Start: 2018-02-23 | End: 2018-02-23 | Stop reason: HOSPADM

## 2018-02-23 RX ORDER — ACETAMINOPHEN 325 MG/1
650 TABLET ORAL EVERY 4 HOURS PRN
Status: DISCONTINUED | OUTPATIENT
Start: 2018-02-23 | End: 2018-02-23

## 2018-02-23 RX ORDER — GUAIFENESIN 600 MG/1
600 TABLET, EXTENDED RELEASE ORAL 2 TIMES DAILY
Qty: 28 TABLET | COMMUNITY
Start: 2018-02-23 | End: 2018-04-21

## 2018-02-23 RX ORDER — ASPIRIN 81 MG/1
162 TABLET, CHEWABLE ORAL ONCE
Status: COMPLETED | OUTPATIENT
Start: 2018-02-23 | End: 2018-02-23

## 2018-02-23 RX ADMIN — GUAIFENESIN 600 MG: 600 TABLET, EXTENDED RELEASE ORAL at 11:53

## 2018-02-23 RX ADMIN — ASPIRIN 81 MG 162 MG: 81 TABLET ORAL at 04:49

## 2018-02-23 RX ADMIN — SULFUR HEXAFLUORIDE 5 ML: KIT at 10:30

## 2018-02-23 RX ADMIN — KETOROLAC TROMETHAMINE 15 MG: 15 INJECTION, SOLUTION INTRAMUSCULAR; INTRAVENOUS at 05:15

## 2018-02-23 RX ADMIN — NITROGLYCERIN 0.4 MG: 0.4 TABLET SUBLINGUAL at 04:50

## 2018-02-23 ASSESSMENT — ENCOUNTER SYMPTOMS
VOMITING: 0
ABDOMINAL PAIN: 0
DIARRHEA: 0
FEVER: 0
RHINORRHEA: 1
COUGH: 1
SHORTNESS OF BREATH: 0
SINUS PAIN: 1
SINUS PRESSURE: 1
NAUSEA: 0

## 2018-02-23 NOTE — PROGRESS NOTES
S/p lap dora, drain left in place. Doing well this morning, complains of RUQ abd pain, likely anticipated post-op pain. Tolerated breakfast. Has not had a BM yet, received Miralax last evening and this morning. Will ambulate pt, if no results, will consider enema. Anticipate discharge home later today.    Irma Rey PA-C

## 2018-02-23 NOTE — IP AVS SNAPSHOT
MRN:[id number]                      After Visit Summary   2/23/2018    Edis Beltre    MRN: [id number]           Thank you!     Thank you for choosing [facility] for your care. Our goal is always to provide you with excellent care. Hearing back from our patients is one way we can continue to improve our services. Please take a few minutes to complete the written survey that you may receive in the mail after you visit. If you would like to speak to someone directly about your visit please contact Patient Relations at [phone]. Thank you!          Patient Information     Date Of Birth          1950        About your hospital stay     You were admitted on:  February 23, 2018 You last received care in the:  [facility] Observation Department    You were discharged on:  February 23, 2018        Reason for your hospital stay       Jaw pain concerning for chest pain equivalent. Serial troponins negative and stress echocardiogram was non-diagnostic as you did not achieve target heart rate, but no significant findings at achieved heart rate. Symptoms likely related to viral upper respiratory illness.                  Who to Call     For medical emergencies, please call 911.  For non-urgent questions about your medical care, please call your primary care provider or clinic, [phone]          Attending Provider     Provider Specialty    Latoya Davidson MD Emergency Medicine    AbdWalker george MD Internal Medicine       Primary Care Provider Office Phone # Fax #    Pedro Brasher [phone]       When to contact your care team       Call your primary doctor if you have any of the following: temperature greater than 101,  increased shortness of breath or chest pain.                  After Care Instructions     Activity       Your activity upon discharge: activity as tolerated            Diet       Follow this diet upon discharge: Regular    "               Follow-up Appointments     Follow-up and recommended labs and tests        Follow up with primary care provider, Pedro Brasher, within 7 days for hospital follow- up.  No follow up labs or test are needed.  If symptoms reoccur after URI has resolved, consider NM Lexiscan given exercise stress test was non diagnostic.                             Pending Results     No orders found from 2/21/2018 to 2/24/2018.            Statement of Approval     Ordered          02/23/18 1210  I have reviewed and agree with all the recommendations and orders detailed in this document.  EFFECTIVE NOW     Approved and electronically signed by:  Irma Rey PA-C             Admission Information     Date & Time Provider Department Dept. Phone    2/23/2018 Walker Lopez MD [facility] Observation Department [phone]      Your Vitals Were     Blood Pressure Pulse Temperature Respirations Height Weight    149/75 (BP Location: Left arm) 93 97.2  F (36.2  C) (Oral) 18 1.803 m (5' 11\") 137 kg (302 lb)    Pulse Oximetry BMI (Body Mass Index)                92% 42.12 kg/m2          [facility] Information     [employer] gives you secure access to your electronic health record. If you see a primary care provider, you can also send messages to your care team and make appointments. If you have questions, please call your primary care clinic.  If you do not have a primary care provider, please call [phone] and they will assist you.        Care EveryWhere ID     This is your Care EveryWhere ID. This could be used by other organizations to access your [place] medical records  [id number]        Equal Access to Services     [facility] BENSON : Hadii tyree mac hadasho Soelsyali, waaxda luqadaha, qaybta kaalmada martinaegyada, edgardo prado. So [facility] [phone].    ATENCIÓN: Si habla español, tiene a medel disposición servicios gratuitos de asistencia lingüística. Llame al [phone].    We " comply with applicable federal civil rights laws and [place] laws. We do not discriminate on the basis of race, color, national origin, age, disability, sex, sexual orientation, or gender identity.               Review of your medicines      START taking        Dose / Directions    guaiFENesin 600 MG 12 hr tablet   Commonly known as:  MUCINEX   Used for:  Acute non-recurrent sinusitis, unspecified location        Dose:  600 mg   Take 1 tablet (600 mg) by mouth 2 times daily   Quantity:  28 tablet   Refills:  0         CONTINUE these medicines which have NOT CHANGED        Dose / Directions    ACIDOPHILUS PROBIOTIC BLEND Caps   Used for:  Diarrhea, unspecified type        Dose:  1 capsule   Take 1 capsule by mouth daily   Refills:  0       aspirin 81 MG tablet        Dose:  81 mg   Take 81 mg by mouth daily   Quantity:  30 tablet   Refills:  0       Daily Multivitamin Tabs        Take 1 tablet by mouth daily   Refills:  0       fish oil-omega-3 fatty acids 1000 MG capsule        Dose:  2 g   Take 2 g by mouth daily   Refills:  0       order for DME   Used for:  Hypersomnia with sleep apnea, unspecified        Equipment being ordered: CPAP and all needed supplies.   Quantity:  1 Device   Refills:  0       pantoprazole 40 MG EC tablet   Commonly known as:  PROTONIX        Dose:  40 mg   Take 40 mg by mouth every 3 days   Refills:  0            Where to get your medicines      Some of these will need a paper prescription and others can be bought over the counter. Ask your nurse if you have questions.     You don't need a prescription for these medications     guaiFENesin 600 MG 12 hr tablet                Protect others around you: Learn how to safely use, store and throw away your medicines at www.disposemymeds.org.             Medication List: This is a list of all your medications and when to take them. Check marks below indicate your daily home schedule. Keep this list as a reference.      Medications            Morning Afternoon Evening Bedtime As Needed    ACIDOPHILUS PROBIOTIC BLEND Caps   Take 1 capsule by mouth daily                                aspirin 81 MG tablet   Take 81 mg by mouth daily                                Daily Multivitamin Tabs   Take 1 tablet by mouth daily                                fish oil-omega-3 fatty acids 1000 MG capsule   Take 2 g by mouth daily                                guaiFENesin 600 MG 12 hr tablet   Commonly known as:  MUCINEX   Take 1 tablet (600 mg) by mouth 2 times daily   Last time this was given:  600 mg on 2/23/2018 11:53 AM                                order for DME   Equipment being ordered: CPAP and all needed supplies.                                pantoprazole 40 MG EC tablet   Commonly known as:  PROTONIX   Take 40 mg by mouth every 3 days

## 2018-02-23 NOTE — PLAN OF CARE
Problem: Patient Care Overview  Goal: Plan of Care/Patient Progress Review  Outcome: Improving  PRIMARY DIAGNOSIS: CHEST PAIN  OUTPATIENT/OBSERVATION GOALS TO BE MET BEFORE DISCHARGE:  1. Ruled out acute coronary syndrome (negative or stable Troponin):  Yes  2. Pain Status: Pain free.  3. Appropriate provocative testing performed: To be done  - Stress Test Procedure: Echo  - Interpretation of cardiac rhythm per telemetry tech: SR    4. Cleared by Consultants (if applicable):N/A  5. Return to near baseline physical activity: Yes  Discharge Planner Nurse   Safe discharge environment identified: Yes  Barriers to discharge: Yes - Awaiting stress echo       Entered by: Gui Bahena 02/23/2018 9:39 AM     RN - Slightly hypertensive. Otherwise VSS. Denying pain/SOB. NPO for stress test.     Please review provider order for any additional goals.   Nurse to notify provider when observation goals have been met and patient is ready for discharge.

## 2018-02-23 NOTE — ED NOTES
Pt called nurse line was told to come in  Having b/l jaw  (TMJ) area  And hot flash  No chest pain  Has had chest cold and congestion saw his md yesterday   No cob  Unable to sleep usually uses c pap but with cold symptoms unable   Took asa and here for eval

## 2018-02-23 NOTE — H&P
Hospitalist Observation Admission Note([facility])    Name: Edis Beltre    MRN: [id number]          YOB: 1950    Age: 67 year old    Date of admission: 2/23/2018    Primary care provider: Pedro Brasher  Admitting physician:Walker Lopez                 Assessment      Brief summary of admission assessment:Edis Beltre is a 67 year old  male with a significant past medical history of obstructive sleep apnea who uses CPAP at home, morbid obesity, hyperlipidemia, degenerative joint disease who presents with bilateral residual pain which started this morning following 4 days history of nasal congestion, runny nose and chest congestion as well.  Evaluation in the emergency department showed normal sinus rhythm with ST and T-wave abnormalities which are nonspecific and negative troponin.  Patient has history of ablation for tachyarrhythmia in the past currently denies any chest pain.  Patient was admitted to rule out acute cardiac ischemia and stress testing later today      #1.  Jaw pain rule out acute coronary syndrome: Suspect this is most likely upper respiratory infection related headache and sinus pressure but acute coronary syndrome cannot be ruled out given patient's multiple risk factors.  #2.  Upper respiratory infection: Likely viral    #3.  Elevated blood pressure: Suspect is baseline hypertension but not currently on medication  Reason for admission:    Observation admission for monitoring and management of jaw pain    Observation Goals:    --Pain controlled, troponins remain negative and stress testing unremarkable         Comorbid conditions:      History of cardiac ablation, degenerative joint disease status post hip surgery             Plan     > Admission Status:Admit to observation     >Care plan:  --Telemetry admission, repeat troponin in the morning, stress testing if repeat troponin is negative   --Nasal swab for influenza, decongestant  --Advised lifestyle  changes and follow-up with blood pressure monitoring with his primary care physician and treatment  >Supportive care:Oxygen continued  Pain management: anxiolytics, oral narcotics and IV narcotics    >Diet:Diet advanced    >Activity:Advance activity as tolerated    >Education/Counseling :Discussed treatment plan with the patient    >Consults:none     >VTE prophylactic measures:prophylaxis against venous thromboembolism    >Therapies:none       >Additional orders    --Patient can be discharged later today if stress test is negative or not significant  --Care plan discussed with the patient/family and agreed to care plan   --Patient will be transferred to care of hospitalist attending for further evaluation and management as appropriate   --Old medical orders reviewed   --imaging result independently reviewed by me     (See orders placed for this visit by me )     - Home medication reviewed and will be continued as appropriate once pharmacy reconciliation is completed             Code Status:     Full Code         Disposition:       >anticipate discharge to home and Discharge later today            Disclaimer: This note consists of symbols derived from keyboarding, dictation and/or voice recognition software. As a result, there may be errors in the script that have gone undetected. Please consider this when interpreting information found in this chart.             Chief Complaint:     Bilateral digital pain     History is obtained from the patient          History of Present Illness:   This patient is a 67 year old  male with a significant past medical history of cardiac ablation, degenerative joint disease, obstructive sleep apnea who presents with the following condition requiring a hospital admission:      Bilateral toe pain which started this morning    Patient is 67-year-old male who came in with complaint of bilateral toe pain which is sharp who came up from sleep this morning.  Patient has been having chest  congestion, runny nose, postnasal drip and stuffiness.  His symptoms has been over the last 4 days and slightly better.  She is wearing CPAP at night and has been having trouble wearing his CPAP due to congestion and drainage.  Patient denies any fever or chills but has been having some sinus pressure and dry cough.  The emergency department patient was initially hypertensive and serum troponin was unremarkable.  EKG showed no ST segment changes which are nonspecific.            Past Medical History:     Past Medical History:   Diagnosis Date     Arthritis      Calcaneal spur      Esophageal reflux      FAMILY HX GI MALIGNANCY 10/1/2007     Gastro-oesophageal reflux disease      NSVT (nonsustained ventricular tachycardia) (H)     on holter 2/2016     Other chronic pain     Joint pain for 30 years.     Personal history of colonic polyps     adenoma - colonoscopy due 2012     Pneumothorax     recurrent, surgically treated     Premature ventricular contraction     ablation 2/26/2016     Pure hypercholesterolemia      Sleep apnea     CPAP            Past Surgical History:     Past Surgical History:   Procedure Laterality Date     ANKLE SURGERY      left     ARTHROPLASTY KNEE  4/14/2014    Procedure: Left Total Knee Arthroplasty ;  Surgeon: Gallito Willis MD;  Location: [facility] OR     BIOPSY      Skin doctor     CHOLECYSTECTOMY, LAPOROSCOPIC  2007     COLONOSCOPY       EYE SURGERY  2013    Cataract surgery on both eyes     H ABLATION PVC  2/26/16     STRIP VEIN      left leg     SURGICAL HISTORY OF -       L VEIN STRIPPING     SURGICAL HISTORY OF -       L MENISCUS- ARTHROSCOPY     SURGICAL HISTORY OF -       L ANKLE SURGERY      THORACIC SURGERY  5/2014    VATS , wedge resction and mechanical pleurodesis -Veronika Rodriguez [place]             Social History:     Social History   Substance Use Topics     Smoking status: Never Smoker     Smokeless tobacco: Never Used     Alcohol use Yes      Comment: Minimal              Family History:     Family History   Problem Relation Age of Onset     Colon Cancer Father      Chronic Obstructive Pulmonary Disease Father      HEART DISEASE Mother      Has a pacemaker     Pacemaker Mother      Family History Negative Brother      Family History Negative Son      Genetic Disorder Paternal Grandmother      DIABETES No family hx of      Hypertension No family hx of      Prostate Cancer No family hx of      Coronary Artery Disease No family hx of      Reviewed and non contributory        Allergies:     Allergies   Allergen Reactions     Atorvastatin      Other reaction(s): Muscle Aches/Weakness     No Clinical Screening - See Comments Rash             Medications:         Prior to Admission medications    Medication Sig Last Dose Taking? Auth Provider   pantoprazole (PROTONIX) 40 MG EC tablet Take 1 tablet (40 mg) by mouth daily Take 30-60 minutes before a meal. 2/22/2018 at Unknown time Yes Pedro Brasher MD   Probiotic Product (ACIDOPHILUS PROBIOTIC BLEND) CAPS Take 1 capsule by mouth daily 2/23/2018 at Unknown time Yes Pedro Brasher MD   fexofenadine-pseudoePHEDrine (ALLEGRA-D)  MG per tablet Take 1 tablet by mouth 2 times daily 2/22/2018 at Unknown time Yes ePdro Brasher MD   aspirin 81 MG tablet Take by mouth daily 2/23/2018 at Unknown time Yes Pedro Brasher MD   fish oil-omega-3 fatty acids (FISH OIL) 1000 MG capsule Take 1 g by mouth daily  2/23/2018 at Unknown time Yes Reported, Patient   DAILY MULTIVITAMIN OR TABS 1 TABLET DAILY 2/23/2018 at Unknown time Yes Reported, Patient   ORDER FOR DME Equipment being ordered: CPAP and all needed supplies.   Pedro Brasher MD          Review of Systems:     A Comprehensive greater than 10 system review of systems was carried out.  Pertinent positives and negatives are noted above in HPI.  Otherwise negative for contributory information.              Physical Exam:     Vitals were reviewed    Temp:  [98.9  F (37.2  C)] 98.9  F  (37.2  C)  Pulse:  [93] 93  Heart Rate:  [74-84] 74  Resp:  [18] 18  BP: (127-172)/(79-93) 136/79  SpO2:  [92 %-97 %] 96 %      GEN:   Alert, oriented x 3, appears comfortable, NAD.  NECK:Supple ,no mass or thyromegaly   HEENT:   Normocephalic/atraumatic, no scleral icterus, no nasal discharge, mouth moist.  No significant tenderness over maxillary and frontal sinuses  CV:   Regular rate and rhythm, no murmur or JVD.  S1 + S2 noted, no S3 or S4.  LUNGS:   Clear to auscultation bilaterally without rales/rhonchi/wheezing/retractions.  Symmetric chest rise on inhalation noted.  ABD:  Active bowel sounds, soft, non-tender/non-distended.  No rebound/guarding/rigidity.  EXT:  No edema.  No cyanosis.  No joint synovitis noted.  SKIN:  Dry to touch, no exanthems noted in the visualized areas.  Neurologic:Grossly intact,non focal     Neuropsychiatric:  General: normal, calm and normal eye contact  Level of consciousness: alert / normal  Affect: normal  Orientation: oriented to self, place, time and situation           Data:       All laboratory and imaging data in the past 24 hours reviewed     Results for orders placed or performed during the hospital encounter of 02/23/18   CBC with platelets differential   Result Value Ref Range    WBC 8.3 4.0 - 11.0 10e9/L    RBC Count 4.97 4.4 - 5.9 10e12/L    Hemoglobin 15.6 13.3 - 17.7 g/dL    Hematocrit 46.0 40.0 - 53.0 %    MCV 93 78 - 100 fl    MCH 31.4 26.5 - 33.0 pg    MCHC 33.9 31.5 - 36.5 g/dL    RDW 13.2 10.0 - 15.0 %    Platelet Count 254 150 - 450 10e9/L    Diff Method Automated Method     % Neutrophils 66.9 %    % Lymphocytes 12.9 %    % Monocytes 12.9 %    % Eosinophils 6.7 %    % Basophils 0.4 %    % Immature Granulocytes 0.2 %    Nucleated RBCs 0 0 /100    Absolute Neutrophil 5.5 1.6 - 8.3 10e9/L    Absolute Lymphocytes 1.1 0.8 - 5.3 10e9/L    Absolute Monocytes 1.1 0.0 - 1.3 10e9/L    Absolute Eosinophils 0.6 0.0 - 0.7 10e9/L    Absolute Basophils 0.0 0.0 - 0.2 10e9/L     Abs Immature Granulocytes 0.0 0 - 0.4 10e9/L    Absolute Nucleated RBC 0.0    Basic metabolic panel   Result Value Ref Range    Sodium 137 133 - 144 mmol/L    Potassium 3.8 3.4 - 5.3 mmol/L    Chloride 106 94 - 109 mmol/L    Carbon Dioxide 24 20 - 32 mmol/L    Anion Gap 7 3 - 14 mmol/L    Glucose 138 (H) 70 - 99 mg/dL    Urea Nitrogen 14 7 - 30 mg/dL    Creatinine 1.14 0.66 - 1.25 mg/dL    GFR Estimate 64 >60 mL/min/1.7m2    GFR Estimate If Black 77 >60 mL/min/1.7m2    Calcium 8.1 (L) 8.5 - 10.1 mg/dL   Troponin I   Result Value Ref Range    Troponin I ES <0.015 0.000 - 0.045 ug/L   EKG 12-lead, tracing only   Result Value Ref Range    Interpretation ECG Click View Image link to view waveform and result             No results found for this or any previous visit (from the past 48 hour(s)).    EKG results: Normal sinus rhythm with ST and T-wave abnormalities           All imaging studies reviewed by me.       Patient`s old medical records reviewed and case discussed with the ED physician.    ED course-Reviewed

## 2018-02-23 NOTE — PROGRESS NOTES
ROOM # 208-2    Living Situation (if not independent, order SW consult): Independent w/Wife  Facility name: N/A  : Wife- An, see demographics    Activity level at baseline: Independent  Activity level on admit: Independent      Patient registered to observation; given Patient Bill of Rights; given the opportunity to ask questions about observation status and their plan of care.  Patient has been oriented to the observation room, bathroom and call light is in place.    Discussed discharge goals and expectations with patient/family.

## 2018-02-23 NOTE — PROGRESS NOTES
Patient's After Visit Summary was reviewed with patient and/or spouse.   Patient verbalized understanding of After Visit Summary, recommended follow up and was given an opportunity to ask questions.   Discharge medications sent home with patient/family: No   Discharged with spouse    OBSERVATION patient END time: 8448

## 2018-02-23 NOTE — ED PROVIDER NOTES
History     Chief Complaint:  Jaw pain    HPI   Edis Beltre is a 67-year-old male who presents for evaluation of jaw pain and having a hot flash.  The patient reports that he has had a cold for the past several days with severe congestion and rhinorrhea as well as a cough.  He states that he normally sleeps with a CPAP but has not been able to do this due to the cold.  Patient reports that he has not been eating well or sleeping well since the onset of his symptoms.  The patient woke up this morning around 0200 when he noticed bilateral jaw pain and feeing warm and sweaty.  The patient reports that he subsequently took 2 baby aspirin.  The patient denies any chest pain, shortness of breath, pain radiation into his arms or shoulders.  He states there are no aggravating or alleviating factors such as positional changes.  He denies feeling any tearing or ripping sensation in his back or his chest.  The patient denies any abdominal pain, nausea, vomiting, diarrhea.  No fever.    Allergies:  Atorvastatin    Medications:    Protonix   Baby aspirin     Past Medical History:    Arthritis   GERD   Hyperlipidemia   Obesity   Sleep apnea     Past Surgical History:    Ankle surgery   TKA, left   Cholecystectomy   H ablation   Vein stripping   VATS   Cataract surgery     Family History:    Colon cancer   COPD     Social History:  Marital Status:    Presents to the ED with wife   Tobacco Use: Never  Alcohol Use: Minimal   PCP: Pedro Brasher     Review of Systems   Constitutional: Negative for fever.   HENT: Positive for congestion, rhinorrhea, sinus pain and sinus pressure.    Respiratory: Positive for cough. Negative for shortness of breath.    Cardiovascular: Negative for chest pain.   Gastrointestinal: Negative for abdominal pain, diarrhea, nausea and vomiting.   All other systems reviewed and are negative.    Physical Exam   Patient Vitals for the past 24 hrs:   BP Temp Temp src Pulse Heart Rate Resp SpO2  "Height Weight   02/23/18 0515 136/79 - - - 74 - 96 % - -   02/23/18 0500 127/83 - - - 80 - 94 % - -   02/23/18 0445 158/86 - - - 81 - 92 % - -   02/23/18 0430 (!) 163/93 - - - 84 - 96 % - -   02/23/18 0419 172/90 98.9  F (37.2  C) Oral 93 - 18 97 % 1.803 m (5' 11\") (!) 137 kg (302 lb)     Physical Exam  Constitutional: Alert, attentive, GCS 15, elderly male in no acute distress  HENT: , no tenderness over TMJ bilaterally, no sinus pain with percussion over frontal and maxillary sinuses   Nose: Nose normal.    Mouth/Throat: Oropharynx is clear, mucous membranes are moist  Eyes: Normal conjunctiva. Pupils are equal, round, and reactive to light.   CV: regular rate and rhythm; no murmurs, rubs or gallups  Chest: Effort normal and breath sounds normal.   GI:  There is no tenderness. No distension. Normal bowel sounds.  MSK: Normal range of motion, 1+ pitting edema to mid-shins.   Neurological: Alert, attentive, oriented x4, strength normal   Skin: Skin is warm and mildly diaphoretic     Emergency Department Course   ECG:  @ 0429  Indication: Jaw pain   Vent. Rate 84 bpm. MO interval 164 ms. QRS duration 104 ms. QT/QTc 402/475 ms. P-R-T axis 39 83 -24.   Normal sinus rhythm. ST & T wave abnormality, consider inferior ischemia. Abnormal ECG.  Old T-wave inversion in lead III, aVF.  Old ST depression V3 through V6.  No significant change when compared to previous ECG from 1/17/18   Read @ 0432 by Dr. Davidson.     Laboratory:  Blood:  CBC:  WBC 8.3, HGB 15.6, , otherwise WNL   BMP: Glc 138, Calcium 8.1, otherwise WNL (Creatinine 1.14)   Troponin I: <0.015 (WNL)      Interventions:  (7649) Aspirin, 162 mg, PO   (0450) Nitroglycerin, 0.4 mg, SL   (0515) Toradol, 15 mg, IV injection     Emergency Department Course:  Nursing notes and vitals reviewed.    (2371) I entered the room with my scribe, obtained the history, and performed an exam of the patient as documented above.    EKG was done, interpretation as above.     A " peripheral IV was established. Blood was drawn from the patient. This was sent for laboratory testing, findings above.      (1636) I went to check in on the patient. He reports no pain change with nitro. Would like to be admitted for ACS rule-out.      Findings and plan explained to the patient who consents to admission.     (3997) I discussed the patient with Dr. Lopez of the hospitalist service, who will admit the patient to an observation bed for further monitoring, evaluation, and treatment.      Impression & Plan    Medical Decision Makin-year-old male presenting with bilateral jaw pain that woke him up from sleep.  Differential diagnosis includes TMJ, trigeminal neuralgia, acute coronary syndrome, angina, aortic dissection.  Patient's EKG shows nonspecific ST and T-wave abnormality that appears consistent with previous EKG.  First troponin is negative.  He was given aspirin.  Patient does describe pain over his bilateral TMJ joints, however they are not tender to palpation and his symptoms are not classic for TMJ disorder.  Symptoms are not consistent with trigeminal neuralgia.  He does have URI symptoms and this may somehow be related.  This could also be atypical anginal symptoms and he has risk factors for CAD.  He did not have any improvement with nitroglycerin.  His HEART score is 5.  I discussed waiting for 6 hour troponin level versus observation admission for the patient for ACS rule out and stress test later today.  Patient and his wife decided to be admitted.  This plan was discussed with the admitting hospitalist.  All questions were answered.    Diagnosis:    ICD-10-CM   1. Jaw pain R68.84     Disposition:  Admission to observation       [facility] EMERGENCY DEPARTMENT      Scribe disclosure:  I, Abran Garzon, am serving as a scribe on 2018 at 4:31 AM to personally document services performed by Dr. Davidson based on my observations and the provider's statements to me.                Latoya Davidson MD  02/23/18 0780

## 2018-02-23 NOTE — IP AVS SNAPSHOT
[facility] Observation Department    [address]    [facility] [address]    Phone:  [phone]                                       After Visit Summary   2/23/2018    Edis Beltre    MRN: [id number]           After Visit Summary Signature Page     I have received my discharge instructions, and my questions have been answered. I have discussed any challenges I see with this plan with the nurse or doctor.    ..........................................................................................................................................  Patient/Patient Representative Signature      ..........................................................................................................................................  Patient Representative Print Name and Relationship to Patient    ..................................................               ................................................  Date                                            Time    ..........................................................................................................................................  Reviewed by Signature/Title    ...................................................              ..............................................  Date                                                            Time

## 2018-02-23 NOTE — ED NOTES
[facility]  ED Nurse Handoff Report    Edis Beltre is a 67 year old male   ED Chief complaint: Jaw Pain and flushed  . ED Diagnosis:   Final diagnoses:   Jaw pain     Allergies:   Allergies   Allergen Reactions     Atorvastatin      Other reaction(s): Muscle Aches/Weakness     No Clinical Screening - See Comments Rash       Code Status: Full Code  Activity level - Baseline/Home:  Independent. Activity Level - Current:   Independent. Lift room needed: No. Bariatric: No   Needed: No   Isolation: No. Infection: Not Applicable.     Vital Signs:   Vitals:    02/23/18 0430 02/23/18 0445 02/23/18 0500 02/23/18 0515   BP: (!) 163/93 158/86 127/83 136/79   Pulse:       Resp:       Temp:       TempSrc:       SpO2: 96% 92% 94% 96%   Weight:       Height:           Cardiac Rhythm:  ,   Cardiac  Cardiac Rhythm: Normal sinus rhythm  Pain level:    Patient confused: No. Patient Falls Risk: No.   Elimination Status: Has voided   Patient Report - Initial Complaint: Jaw pain. Focused Assessment: Pt A&Ox4 C/O jaw pain PTA. Pt called RN line and told to come in for evaluation. Pt denies SOB or chest pain / pressure. Pt A&Ox4 on arrival ABC's intact, pt NSR on monitor. + cough and URI sx for 3 days  Tests Performed: EKG, Labs. Abnormal Results:   Labs Ordered and Resulted from Time of ED Arrival Up to the Time of Departure from the ED   BASIC METABOLIC PANEL - Abnormal; Notable for the following:        Result Value    Glucose 138 (*)     Calcium 8.1 (*)     All other components within normal limits   CBC WITH PLATELETS DIFFERENTIAL   TROPONIN I   Treatments provided: ASA, nitro x 1, Toradol   Family Comments: Wife at bedside   OBS brochure/video discussed/provided to patient:  Yes  ED Medications:   Medications   nitroGLYcerin (NITROSTAT) sublingual tablet 0.4 mg (0.4 mg Sublingual Given 2/23/18 0450)   aspirin chewable tablet 162 mg (162 mg Oral Given 2/23/18 0449)   ketorolac (TORADOL) injection 15 mg (15  mg Intravenous Given 2/23/18 0515)     Drips infusing:  No  For the majority of the shift, the patient's behavior Green. Interventions performed were .     Severe Sepsis OR Septic Shock Diagnosis Present: No      ED Nurse Name/Phone Number: Janfabrice AYAH Platt,   5:30 AM  RECEIVING UNIT ED HANDOFF REVIEW    Above ED Nurse Handoff Report was reviewed: Yes  Reviewed by: Cely Salazar on February 23, 2018 at 6:24 AM

## 2018-02-23 NOTE — DISCHARGE SUMMARY
[facility] Outpatient / Observation Unit  Discharge Summary        Edis Beltre MRN# [id number]   YOB: 1950 Age: 67 year old     Date of Admission:  2/23/2018  Date of Discharge:  2/23/2018  Admitting Physician:  Walker Lopez MD  Discharge Physician: Irma Rey PA-C  Discharging Service: Hospitalist      Primary Provider: Pedro Brasher  Primary Care Physician Phone Number: [phone]         Primary Discharge Diagnoses:    Edis Beltre was admitted on 2/23/2018 for concerns of jaw pain concerning for chest pain equivalent.     1. Jaw pain: Ruled out ACS. Serial troponins were negative. Stress echocardiogram was non diagnostic as he didn't achieve target heart, no ischemic findings at achieved heart rate level. Symptoms likely due to viral URI, if sx reoccur after URI resolves, consider NM Lexiscan.   2. URI - continue supportive cares, follow up with PCP        Secondary Discharge Diagnoses:     Past Medical History:   Diagnosis Date     Arthritis      Calcaneal spur      Esophageal reflux      FAMILY HX GI MALIGNANCY 10/1/2007     Gastro-oesophageal reflux disease      NSVT (nonsustained ventricular tachycardia) (H)     on holter 2/2016     Other chronic pain     Joint pain for 30 years.     Personal history of colonic polyps     adenoma - colonoscopy due 2012     Pneumothorax     recurrent, surgically treated     Premature ventricular contraction     ablation 2/26/2016     Pure hypercholesterolemia      Sleep apnea     CPAP                Code Status:      Full Code        Brief Hospital Summary:        Reason for your hospital stay       Jaw pain concerning for chest pain equivalent. Serial troponins negative   and stress echocardiogram was non-diagnostic as you did not achieve target   heart rate, but no significant findings at achieved heart rate. Symptoms   likely related to viral upper respiratory illness.                    Please refer to initial admission history and  physical for further details.   Briefly, Edis Beltre was admitted on 2/23/2018 for concerns of acute jaw pain.   Initial work up in the ED did not reveal evidence of STEMI or findings consistent with unstable angina or acute coronary ischemia. Pt was registered to the Observation Unit for further evaluation.   Pt ruled out with serial troponins, underwent Stress Echo that did not show evidence of significant coronary ischemia although was considered non diagnostic as he did not achieve target heart rate. Labs were reviewed and significant results addressed. On the day of discharge, pt was pain free, with no complaints of jaw pain. Medications were reviewed and adjustments made as necessary. Pt is instructed to follow up as below.           Significant Lab During Hospitalization:        Recent Labs  Lab 02/23/18  0435   WBC 8.3   HGB 15.6   HCT 46.0   MCV 93          Recent Labs  Lab 02/23/18  0435      POTASSIUM 3.8   CHLORIDE 106   CO2 24   ANIONGAP 7   *   BUN 14   CR 1.14   GFRESTIMATED 64   GFRESTBLACK 77   EDWIN 8.1*       Recent Labs  Lab 02/23/18  0828 02/23/18  0435   TROPI <0.015 <0.015                Significant Imaging During Hospitalization:      No results found for this or any previous visit (from the past 48 hour(s)).           Pending Results:        Unresulted Labs Ordered in the Past 30 Days of this Admission     No orders found from 12/25/2017 to 2/24/2018.              Consultations This Hospital Stay:      No consultations were requested during this admission         Discharge Instructions and Follow-Up:      Follow-up Appointments     Follow-up and recommended labs and tests        Follow up with primary care provider, Pedro Brasher, within 7 days for   hospital follow- up.  No follow up labs or test are needed.  If symptoms reoccur after URI has resolved, consider NM Lexiscan given   exercise stress test was non diagnostic.                  Pt instructed to follow up  "with PCP in 7 days.   Follow-up Labs None        Discharge Disposition:      Discharged to home         Discharge Medications:        Current Discharge Medication List      START taking these medications    Details   guaiFENesin (MUCINEX) 600 MG 12 hr tablet Take 1 tablet (600 mg) by mouth 2 times daily  Qty: 28 tablet    Associated Diagnoses: Acute non-recurrent sinusitis, unspecified location         CONTINUE these medications which have NOT CHANGED    Details   fish oil-omega-3 fatty acids 1000 MG capsule Take 2 g by mouth daily      pantoprazole (PROTONIX) 40 MG EC tablet Take 40 mg by mouth every 3 days      Probiotic Product (ACIDOPHILUS PROBIOTIC BLEND) CAPS Take 1 capsule by mouth daily    Associated Diagnoses: Diarrhea, unspecified type      aspirin 81 MG tablet Take 81 mg by mouth daily   Qty: 30 tablet      DAILY MULTIVITAMIN OR TABS Take 1 tablet by mouth daily      ORDER FOR DME Equipment being ordered: CPAP and all needed supplies.  Qty: 1 Device, Refills: 0    Associated Diagnoses: Hypersomnia with sleep apnea, unspecified                 Allergies:         Allergies   Allergen Reactions     Atorvastatin      Other reaction(s): Muscle Aches/Weakness     No Clinical Screening - See Comments Rash           Condition and Physical on Discharge:      Discharge condition: Stable   Vitals: Blood pressure 156/74, pulse 93, temperature 97.5  F (36.4  C), temperature source Oral, resp. rate 18, height 1.803 m (5' 11\"), weight (!) 137 kg (302 lb), SpO2 95 %.  302 lbs 0 oz      GENERAL:  Comfortable.  PSYCH: pleasant, oriented, No acute distress.  HEART: RRR.  LUNGS:  Normal Respiratory effort.   EXTREMITIES:  Able to ambulate.  SKIN:  Dry to touch, No rash, wound or ulcerations.  NEUROLOGIC:  Grossly intact    Irma Rey PA-C  "

## 2018-02-23 NOTE — TELEPHONE ENCOUNTER
Additional Information    Negative: [1] Life-threatening reaction in the past to similar substance (e.g., food, insect bite/sting, medication, etc.) AND [2] < 2 hours since exposure    Negative: Wheezing, stridor, hoarseness, or difficulty breathing    Negative: [1] Tightness in the chest or throat AND [2] begins within 2 hours of exposure to allergic substance    Negative: Difficulty swallowing, drooling or slurred speech    Negative: Difficult to awaken or acting confused (disoriented, slurred speech)    Negative: Unresponsive, passed out or very weak    Negative: Other symptom of severe allergic reaction (Exception: Hives or facial swelling alone)    Negative: Sounds like a life-threatening emergency to the triager    Negative: [1] Widespread hives AND [2] onset > 2 hours after exposure to high-risk allergen (e.g., sting, nuts, 1st dose of antibiotic)    Negative: [1] Widespread itching AND [2] onset > 2 hours after exposure to high-risk allergen (e.g., sting, nuts, 1st dose of antibiotic)    Negative: [1] Face swelling AND [2] onset > 2 hours after exposure to high-risk allergen (e.g., sting, nuts, 1st dose of antibiotic)    Negative: [1] Widespread hives, itching or facial swelling AND [2] onset < 2 hours of exposure to high-risk allergen (e.g., sting, nuts, 1st dose of antibiotic)    Negative: [1] Vomiting or abdominal cramps AND [2] onset < 2 hours of exposure to high-risk allergen  (e.g., sting, nuts, 1st dose of antibiotic)    Negative: [1] Gave epinephrine shot AND [2] no symptoms now    Negative: [1] Gave asthma inhaler or neb AND [2] no symptoms now    Negative: [1] Took antihistamine (e.g., Benadryl) by mouth AND [2] no symptoms now    Protocols used: ANAPHYLAXIS-ADULT-AH  Caller states he is having tightness in his jaws and he is flushed. Caller states he has had cold symptoms and upper respiratory symptoms. Caller is anxious and panicky. Triager advised patient to go to the ED. Caller verbalized and  understands recommendation.

## 2018-02-26 ENCOUNTER — TELEPHONE (OUTPATIENT)
Dept: FAMILY MEDICINE | Facility: CLINIC | Age: 68
End: 2018-02-26

## 2018-02-26 NOTE — TELEPHONE ENCOUNTER
"ED/Discharge Protocol    \"Hi, my name is Pricila Madrid, a registered nurse, and I am calling on behalf of Dr. Brasher's office at [place].  I am calling to follow up and see how things are going for you after your recent visit.\"    \"I see that you were in the (ER/UC/IP) on [facility] for inpatient hospital stay on 2/23 for jaw pain, acute non-recurrent sinusitis.    How are you doing now that you are home?\" Still congested (Yellow, green)  DENIES: fevers, CP, SOB, Difficulty Breathing    Is patient experiencing symptoms that may require a hospital visit?  No    Discharge Instructions    \"Let's review your discharge instructions.  What is/are the follow-up recommendations?  Pt. Response: FU with PCP within 7 days    \"Were you instructed to make a follow-up appointment?\"  Pt. Response: Yes.  Has appointment been made?   No.  \"Can I help you schedule that appointment?\" 02/27/2018      \"When you see the provider, I would recommend that you bring your discharge instructions with you.    Medications    \"How many new medications are you on since your hospitalization/ED visit?\"    0-1  \"How many of your current medicines changed (dose, timing, name, etc.) while you were in the hospital/ED visit?\"   0-1  \"Do you have questions about your medications?\"   No  \"Were you newly diagnosed with heart failure, COPD, diabetes or did you have a heart attack?\"   No  For patients on insulin: \"Did you start on insulin in the hospital or did you have your insulin dose changed?\"   No    Medication reconciliation completed? Yes    Was MTM referral placed (*Make sure to put transitions as reason for referral)?   No    Call Summary    \"Do you have any questions or concerns about your condition or care plan at the moment?\"    No  Triage nurse advice given: FU with PCP, home care measures given    Patient was in ER 1 in the past year (assess appropriateness of ER visits.)      \"If you have questions or things don't continue to " "improve, we encourage you contact us through the main clinic number,  [phone].  Even if the clinic is not open, triage nurses are available 24/7 to help you.     We would like you to know that our clinic has extended hours (provide information).  We also have urgent care (provide details on closest location and hours/contact info)\"      \"Thank you for your time and take care!\"        Pricila Madrid RN  [facility]    "

## 2018-02-26 NOTE — TELEPHONE ENCOUNTER
Patient discharged from [facility] for inpatient hospital stay on 2/23 for jaw pain, acute non-recurrent sinusitis.    Please contact patient to follow up; no appointment scheduled at this time.    ER / IP:  0/0    Care Coordination:  hunter Aparicio

## 2018-02-26 NOTE — PROGRESS NOTES
SUBJECTIVE:                                                    Edis Beltre is a 67 year old male who presents to clinic today for the following health issues:    HOSPITAL  Followup:    Facility:  [facility] ED to Observation unit  Date of visit: 02/23/2018  Reason for visit: Jaw Pain- URI  Current Status: mild improvement today- nose running- coughing- sleeping in recliner- can not taste anything     Hospital Follow-up Visit:  Hospital/Nursing Home/IP Rehab Facility: [facility]  Date of Admission: 2/23/18  Date of Discharge: 2/23/18  Reason(s) for Admission: JAW PAIN AND uri            Problems taking medications regularly:  None       Medication changes since discharge: None       Problems adhering to non-medication therapy:  None    Summary of hospitalization:  [facility] discharge summary reviewed  Diagnostic Tests/Treatments reviewed.  Follow up needed: none - POSSIBLE LEXISCAN PRN  Other Healthcare Providers Involved in Patient s Care:         None  Update since discharge: improved. But still has     Post Discharge Medication Reconciliation: discharge medications reconciled, continue medications without change.  Plan of care communicated with patient     Coding guidelines for this visit:  Type of Medical   Decision Making Face-to-Face Visit       within 7 Days of discharge Face-to-Face Visit        within 14 days of discharge   Moderate Complexity [id number]   High Complexity [id number]         - Pt reported to the ED with jaw pain & SOB, denied chest pain. He is still experiencing sinus pressure, drainage & insomnia due to sputum production -- unable to sleep in bed, must sit up in recliner. He denies any fever, has used Mucinex for treatment & reports mild success. He does note his energy level is decreased, only wants to sleep & rest at home.        Problem list and histories reviewed & adjusted, as indicated.  Additional history: as documented    ROS:  Constitutional,  "HEENT, cardiovascular, pulmonary, GI, , musculoskeletal, neuro, skin, endocrine and psych systems are negative, except as otherwise noted.    This document serves as a record of the services and decisions personally performed and made by Pedro Brasher MD. It was created on his behalf by Gabriela Gray, a trained medical scribe. The creation of this document is based the provider's statements to the medical scribe.  Scribe Gabriela Gray 10:11 AM, February 27, 2018    OBJECTIVE:                                                    /80  Pulse 83  Temp 98.1  F (36.7  C) (Oral)  Ht 1.803 m (5' 11\")  Wt 135.6 kg (299 lb)  SpO2 95%  BMI 41.7 kg/m2 Body mass index is 41.7 kg/(m^2).   GENERAL: healthy, alert, well nourished, well hydrated, no distress  HENT: ear canals- normal; TMs- normal; Nose- normal; Mouth- no ulcers, no lesions  NECK: no tenderness, no adenopathy, no asymmetry, no masses, no stiffness; thyroid- normal to palpation  RESP: lungs clear to auscultation - no rales, no rhonchi, no wheezes  CV: regular rates and rhythm, normal S1 S2, no S3 or S4 and no murmur, no click or rub -  ABDOMEN: soft, no tenderness, no  hepatosplenomegaly, no masses, normal bowel sounds  MS: extremities- no gross deformities noted, no edema  SKIN: no suspicious lesions, no rashes  PSYCH: Alert and oriented times 3; speech- coherent , normal rate and volume; able to articulate logical thoughts, able to abstract reason, no tangential thoughts, no hallucinations or delusions, affect- normal    Diagnostic test results:  No results found for this or any previous visit (from the past 24 hour(s)).     ASSESSMENT/PLAN:         Edis was seen today for hospital f/u.    Diagnoses and all orders for this visit:    Jaw pain/Upper respiratory tract infection, unspecified type - Pt advised to continue Mucinex, rest & hydrate, begin light exercise to increase energy; Report back if symptoms worsen or new ones arise        Risks, benefits and " "alternatives of treatments discussed. Plan agreed on.      Followup: Return to clinic prn.    Will call, return to clinic, or go to ED if worsening or symptoms not improving as discussed.    See patient instructions.       BMI:   Estimated body mass index is 41.7 kg/(m^2) as calculated from the following:    Height as of this encounter: 1.803 m (5' 11\").    Weight as of this encounter: 135.6 kg (299 lb).   Weight management plan: Discussed healthy diet and exercise guidelines and patient will follow up in 12 months in clinic to re-evaluate.        The information in this document, created by the medical scribe for me, accurately reflects the services I personally performed and the decisions made by me. I have reviewed and approved this document for accuracy prior to leaving the patient care area.  10:11 AM, 02/27/18        Chalino Brasher MD   Pager: [phone]    "

## 2018-02-27 ENCOUNTER — OFFICE VISIT (OUTPATIENT)
Dept: FAMILY MEDICINE | Facility: CLINIC | Age: 68
End: 2018-02-27
Payer: COMMERCIAL

## 2018-02-27 VITALS
HEIGHT: 71 IN | DIASTOLIC BLOOD PRESSURE: 80 MMHG | SYSTOLIC BLOOD PRESSURE: 120 MMHG | BODY MASS INDEX: 41.86 KG/M2 | TEMPERATURE: 98.1 F | HEART RATE: 83 BPM | WEIGHT: 299 LBS | OXYGEN SATURATION: 95 %

## 2018-02-27 DIAGNOSIS — R68.84 JAW PAIN: Primary | ICD-10-CM

## 2018-02-27 DIAGNOSIS — J06.9 UPPER RESPIRATORY TRACT INFECTION, UNSPECIFIED TYPE: ICD-10-CM

## 2018-02-27 PROCEDURE — 99495 TRANSJ CARE MGMT MOD F2F 14D: CPT | Performed by: FAMILY MEDICINE

## 2018-02-27 NOTE — MR AVS SNAPSHOT
After Visit Summary   2/27/2018    Edis Beltre    MRN: [id number]           Patient Information     Date Of Birth          1950        Visit Information        Provider Department      2/27/2018 10:00 AM Pedro Brasher MD [facility]        Today's Diagnoses     Jaw pain    -  1    Upper respiratory tract infection, unspecified type           Follow-ups after your visit        Follow-up notes from your care team     Return in about 1 week (around 3/6/2018), or if symptoms worsen or fail to improve.      Who to contact     If you have questions or need follow up information about today's clinic visit or your schedule please contact [facility] directly at [phone].  Normal or non-critical lab and imaging results will be communicated to you by MyChart, letter or phone within 4 business days after the clinic has received the results. If you do not hear from us within 7 days, please contact the clinic through [employer] or phone. If you have a critical or abnormal lab result, we will notify you by phone as soon as possible.  Submit refill requests through [employer] or call your pharmacy and they will forward the refill request to us. Please allow 3 business days for your refill to be completed.          Additional Information About Your Visit        MyChart Information     [employer] gives you secure access to your electronic health record. If you see a primary care provider, you can also send messages to your care team and make appointments. If you have questions, please call your primary care clinic.  If you do not have a primary care provider, please call [phone] and they will assist you.        Care EveryWhere ID     This is your Care EveryWhere ID. This could be used by other organizations to access your [facility] medical records  [id number]        Your Vitals Were     Pulse Temperature Height Pulse Oximetry BMI (Body Mass Index)       83 98.1  F (36.7  C)  "(Oral) 5' 11\" (1.803 m) 95% 41.7 kg/m2        Blood Pressure from Last 3 Encounters:   02/27/18 120/80   02/23/18 149/75   02/20/18 120/74    Weight from Last 3 Encounters:   02/27/18 299 lb (135.6 kg)   02/23/18 (!) 302 lb (137 kg)   02/20/18 300 lb (136.1 kg)              Today, you had the following     No orders found for display       Primary Care Provider Office Phone # Fax #    Pedro Brasher [phone] [address] [facility] [address]        Equal Access to Services     DOROTHY KNOWLES : Hadii tyree Jones, wadamari soria, qachristian kaalmada jersey, edgardo sosa . So [facility] [phone].    ATENCIÓN: Si habla español, tiene a medel disposición servicios gratuitos de asistencia lingüística. Llame al [phone].    We comply with applicable federal civil rights laws and [place] laws. We do not discriminate on the basis of race, color, national origin, age, disability, sex, sexual orientation, or gender identity.            Thank you!     Thank you for choosing [facility]  for your care. Our goal is always to provide you with excellent care. Hearing back from our patients is one way we can continue to improve our services. Please take a few minutes to complete the written survey that you may receive in the mail after your visit with us. Thank you!             Your Updated Medication List - Protect others around you: Learn how to safely use, store and throw away your medicines at www.disposemymeds.org.          This list is accurate as of 2/27/18 10:19 AM.  Always use your most recent med list.                   Brand Name Dispense Instructions for use Diagnosis    ACIDOPHILUS PROBIOTIC BLEND Caps      Take 1 capsule by mouth daily    Diarrhea, unspecified type       aspirin 81 MG tablet     30 tablet    Take 81 mg by mouth daily        Daily Multivitamin Tabs      Take 1 tablet by mouth daily        fish oil-omega-3 fatty " acids 1000 MG capsule      Take 2 g by mouth daily        guaiFENesin 600 MG 12 hr tablet    MUCINEX    28 tablet    Take 1 tablet (600 mg) by mouth 2 times daily    Acute non-recurrent sinusitis, unspecified location       order for DME     1 Device    Equipment being ordered: CPAP and all needed supplies.    Hypersomnia with sleep apnea, unspecified       pantoprazole 40 MG EC tablet    PROTONIX     Take 40 mg by mouth every 3 days

## 2018-02-27 NOTE — NURSING NOTE
"Chief Complaint   Patient presents with     Hospital F/U       Initial /80  Pulse 83  Temp 98.1  F (36.7  C) (Oral)  Ht 5' 11\" (1.803 m)  Wt 299 lb (135.6 kg)  SpO2 95%  BMI 41.7 kg/m2 Estimated body mass index is 41.7 kg/(m^2) as calculated from the following:    Height as of this encounter: 5' 11\" (1.803 m).    Weight as of this encounter: 299 lb (135.6 kg).  Medication Reconciliation: complete  "

## 2018-03-08 ENCOUNTER — TELEPHONE (OUTPATIENT)
Dept: FAMILY MEDICINE | Facility: CLINIC | Age: 68
End: 2018-03-08

## 2018-03-08 DIAGNOSIS — J01.90 ACUTE SINUSITIS WITH SYMPTOMS > 10 DAYS: Primary | ICD-10-CM

## 2018-03-08 NOTE — TELEPHONE ENCOUNTER
"RESPIRATORY SYMPTOMS      Duration: 3 weeks    Description  Left sinus is congested, occasional productive cough, he said phlegm is greenish/yellow    Severity: moderate    Accompanying signs and symptoms: Tired, \"run down\", unable to taste food for 3 weeks    History (predisposing factors):  none    Precipitating or alleviating factors: None    Therapies tried and outcome:  Mucinex       Patient has used a whole package of Mucinex and he is now getting congested again.  His left sinus still feels full. He is trying to sleep with head elevated and is taking hot showers.  He said symptoms have improved but he is just really tired and is still unable to taste anything.        Patient can be reached at [phone], okay to leave detailed message.    Belkys Gautam, RHONDA, RN, N  [facility]) [phone]      "

## 2018-03-08 NOTE — TELEPHONE ENCOUNTER
Called patient @ # below    Advised of MD SÁNCHEZ message   Advised patient that if new or worsening symptoms appear (reviewed new & worsening symptoms) to call the clinic - Patient stated an understanding and agreed with plan.    Pricila Madrid RN  [facility]

## 2018-03-15 ENCOUNTER — TELEPHONE (OUTPATIENT)
Dept: FAMILY MEDICINE | Facility: CLINIC | Age: 68
End: 2018-03-15

## 2018-03-15 NOTE — PROGRESS NOTES
SUBJECTIVE:   Edis Belrte is a 67 year old male who presents to clinic today for the following health issues:    Acute Illness   Acute illness concerns: Cough/ Sinus /Seen at 03/17/2018  ER  Onset: x 6 weeks    Fever: no    Chills/Sweats: no    Headache (location?): no    Sinus Pressure:YES    Conjunctivitis:  no    Ear Pain: YES- Fullness    Rhinorrhea: no    Congestion: no    Sore Throat: no     Cough: YES-productive of clear sputum    Wheeze: no    Decreased Appetite: YES    Nausea: no    Vomiting: no    Diarrhea:  no    Dysuria/Freq.: no    Fatigue/Achiness: YES- Tired    Sick/Strep Exposure: no     Therapies Tried and outcome: Alicia Randhawa reported that he has been struggling with URI symptoms for 6 weeks, with varying severity. He was put on Augmentin 3/8/18 - some improvement, 60% improved. He is using CPAP nightly.     He discussed his ER visit, regarding jaw pain at onset of URI.     He complained of ear pain/congestion (L>R), maxillary sinus pain/congestion, postnasal drip, cough (worse in the morning with green sputum). He reported some mild diarrhea with Augmentin use - running out of pills.     Denied fever/chills/sweats, sore throat, rhinorrhea, sore throat.       Problem list and histories reviewed & adjusted, as indicated.  Additional history: as documented    Reviewed and updated as needed this visit by clinical staff  Tobacco  Allergies  Meds  Med Hx  Surg Hx  Fam Hx  Soc Hx      Reviewed and updated as needed this visit by Provider       BP Readings from Last 3 Encounters:   03/16/18 136/78   02/27/18 120/80   02/23/18 149/75     Wt Readings from Last 4 Encounters:   03/16/18 300 lb (136.1 kg)   02/27/18 299 lb (135.6 kg)   02/23/18 (!) 302 lb (137 kg)   02/20/18 300 lb (136.1 kg)       Health Maintenance    Health Maintenance Due   Topic Date Due     MEDICARE ANNUAL WELLNESS VISIT  05/24/1966     HEPATITIS C SCREENING  05/24/1966     LIPID MONITORING Q1 YEAR  02/17/2017     PSA  Q1 YR  02/17/2017       Current Problem List    Patient Active Problem List   Diagnosis     Gastroesophageal reflux disease without esophagitis     LAURA (obstructive sleep apnea)     FAMILY HX GI MALIGNANCY     Hyperlipidemia LDL goal <100     Morbid obesity due to excess calories (H)     Prediabetes     Advanced directives, counseling/discussion     S/P total knee arthroplasty     Premature ventricular contraction     Postprocedural pneumothorax - 2014     Metatarsalgia of right foot     Jaw pain       Past Medical History    Past Medical History:   Diagnosis Date     Arthritis      Calcaneal spur      Esophageal reflux      FAMILY HX GI MALIGNANCY 10/1/2007     Gastro-oesophageal reflux disease      NSVT (nonsustained ventricular tachycardia) (H)     on holter 2/2016     Other chronic pain     Joint pain for 30 years.     Personal history of colonic polyps     adenoma - colonoscopy due 2012     Pneumothorax     recurrent, surgically treated     Premature ventricular contraction     ablation 2/26/2016     Pure hypercholesterolemia      Sleep apnea     CPAP       Past Surgical History    Past Surgical History:   Procedure Laterality Date     ANKLE SURGERY      left     ARTHROPLASTY KNEE  4/14/2014    Procedure: Left Total Knee Arthroplasty ;  Surgeon: Gallito Willis MD;  Location: [facility] OR     BIOPSY      Skin doctor     CHOLECYSTECTOMY, LAPOROSCOPIC  2007     COLONOSCOPY       EYE SURGERY  2013    Cataract surgery on both eyes     H ABLATION PVC  2/26/16     STRIP VEIN      left leg     SURGICAL HISTORY OF -       L VEIN STRIPPING     SURGICAL HISTORY OF -       L MENISCUS- ARTHROSCOPY     SURGICAL HISTORY OF -       L ANKLE SURGERY      THORACIC SURGERY  5/2014    [place]ATS , [place]       Current Medications    Current Outpatient Prescriptions   Medication Sig Dispense Refill     azithromycin (ZITHROMAX) 250 MG tablet 2 tabs day 1 and the 1 tab daily for 4  more days 6 tablet 0     amoxicillin-clavulanate (AUGMENTIN) 875-125 MG per tablet Take 1 tablet by mouth 2 times daily 20 tablet 0     fish oil-omega-3 fatty acids 1000 MG capsule Take 2 g by mouth daily       aspirin 81 MG tablet Take 81 mg by mouth daily  30 tablet      DAILY MULTIVITAMIN OR TABS Take 1 tablet by mouth daily       pantoprazole (PROTONIX) 40 MG EC tablet Take 40 mg by mouth every 3 days       guaiFENesin (MUCINEX) 600 MG 12 hr tablet Take 1 tablet (600 mg) by mouth 2 times daily 28 tablet      Probiotic Product (ACIDOPHILUS PROBIOTIC BLEND) CAPS Take 1 capsule by mouth daily       ORDER FOR DME Equipment being ordered: CPAP and all needed supplies. 1 Device 0       Allergies    Allergies   Allergen Reactions     Atorvastatin      Other reaction(s): Muscle Aches/Weakness       Immunizations    Immunization History   Administered Date(s) Administered     Influenza (High Dose) 3 valent vaccine 11/07/2014, 12/31/2015, 11/01/2016, 10/17/2017     Pneumo Conj 13-V (2010&after) 12/31/2015     Pneumococcal 23 valent 05/05/2014     TD (ADULT, 7+) 02/10/1998, 04/19/2011       Family History    Family History   Problem Relation Age of Onset     Colon Cancer Father      Chronic Obstructive Pulmonary Disease Father      HEART DISEASE Mother      Has a pacemaker     Pacemaker Mother      Family History Negative Brother      Family History Negative Son      Genetic Disorder Paternal Grandmother      DIABETES No family hx of      Hypertension No family hx of      Prostate Cancer No family hx of      Coronary Artery Disease No family hx of        Social History    Social History     Social History     Marital status:      Spouse name: An     Number of children: 1     Years of education: N/A     Occupational History     retired teacher [employer]     Social History Main Topics     Smoking status: Never Smoker     Smokeless tobacco: Never Used     Alcohol use Yes      Comment: Minimal      Drug use: No     Sexual activity: Not Currently     Partners: Female     Birth control/ protection: None     Other Topics Concern      Service No     Caffeine Concern No     Sleep Concern Yes     LAURA- on CPAP     Exercise Yes     some swimming     Seat Belt Yes     Parent/Sibling W/ Cabg, Mi Or Angioplasty Before 65f 55m? No     Social History Narrative       All above reviewed and updated, all stable unless otherwise noted    Recent labs reviewed    ROS:  Constitutional, HEENT, cardiovascular, pulmonary, GI, , musculoskeletal, neuro, skin, endocrine and psych systems are negative, except as in HPI or otherwise noted     This document serves as a record of the services and decisions personally performed and made by Nato Ceballos MD FAAFP. It was created on their behalf by Jhon Lord, a trained medical scribe. The creation of this document is based the provider's statements to the medical scribe.  John Lord March 16, 2018 9:24 AM      OBJECTIVE:                                                    /78  Pulse 84  Temp 98.6  F (37  C) (Tympanic)  Resp 12  Ht 6' (1.829 m)  Wt 300 lb (136.1 kg)  SpO2 97%  BMI 40.69 kg/m2  Body mass index is 40.69 kg/(m^2).  GENERAL: healthy, alert and no distress, morbidly obese   HENT: ear canals and TM's with impacted cerumen bilaterally upon viewing with otoscope, nose and mouth without ulcers or lesions upon viewing with otoscope  RESP: lungs clear to auscultation - no rales, no rhonchi, no wheezes  CV: regular rates and rhythm, normal S1 S2, no S3 or S4 and no murmur, no click or rub - no carotid bruits  ABDOMEN: soft, no tenderness, no  hepatosplenomegaly, no masses, normal bowel sounds  MS: extremities- no gross deformities noted, no edema  SKIN: no suspicious lesions, no rashes to visible skin  NEURO: mentation intact and speech normal  BACK: no CVA tenderness, no paralumbar tenderness  PSYCH: affect normal    DIAGNOSTICS/PROCEDURES:                                                       No results found for this or any previous visit (from the past 24 hour(s)).     ASSESSMENT/PLAN:                                                        ICD-10-CM    1. Acute sinusitis with symptoms > 10 days J01.90 azithromycin (ZITHROMAX) 250 MG tablet   2. Acute bronchitis with symptoms greater than 10 days J20.9 azithromycin (ZITHROMAX) 250 MG tablet   3. Prediabetes R73.03    4. Gastroesophageal reflux disease without esophagitis K21.9    5. LAURA (obstructive sleep apnea) G47.33    6. Bilateral impacted cerumen H61.23    7. Medication monitoring encounter Z51.81      Discussed treatment/modality options, including risk and benefits, he desires follow up for fasting physical exam, further health care maintenance, further lab(s), new medications (zithromax - take if not improving), OTC meds (mucinex DM), and observation. All diagnosis above reviewed and noted above, otherwise stable.  See [facility] orders for further details.  Follow up as needed.    Health Maintenance Due   Topic Date Due     MEDICARE ANNUAL WELLNESS VISIT  05/24/1966     HEPATITIS C SCREENING  05/24/1966     LIPID MONITORING Q1 YEAR  02/17/2017     PSA Q1 YR  02/17/2017     Patient Instructions     Zithromax prescription given -- start taking this if your symptoms don't improve after you complete Augmentin - two tablets the first day and one tablet the next 4 days    Use OTC Mucinex DM and warm showers to help with congestion    Push fluids and rest    Follow up if symptoms worsen or don't improve     Follow up for fasting physical when your symptoms improve     The information in this document, created by the medical scribe for me, accurately reflects the services I personally performed and the decisions made by me. I have reviewed and approved this document for accuracy.   Nato Ceballos MD FAAFP            Nato Ceballos MD [address]  [address]  [phone]  [phone] Fax

## 2018-03-15 NOTE — TELEPHONE ENCOUNTER
See 3/8/2018 telephone encounter.  He said he is feeling a little better but he is still plugged up and left ear feels plugged.  He said drainage is still about the same.    He wakes up coughing in the morning due to post nasal drainage.  He said he still has no taste.    He said drainage is a light green now.  He said he has started sneezing.    Rn advised office visit for evaluation.  Appointment scheduled with  for tomorrow morning.  He verbalized understanding and agreed with plan.      RHONDA Samuels, RN, N  [facility]) [phone]

## 2018-03-16 ENCOUNTER — OFFICE VISIT (OUTPATIENT)
Dept: FAMILY MEDICINE | Facility: CLINIC | Age: 68
End: 2018-03-16
Payer: COMMERCIAL

## 2018-03-16 VITALS
SYSTOLIC BLOOD PRESSURE: 136 MMHG | HEIGHT: 72 IN | WEIGHT: 300 LBS | BODY MASS INDEX: 40.63 KG/M2 | HEART RATE: 84 BPM | TEMPERATURE: 98.6 F | RESPIRATION RATE: 12 BRPM | DIASTOLIC BLOOD PRESSURE: 78 MMHG | OXYGEN SATURATION: 97 %

## 2018-03-16 DIAGNOSIS — J01.90 ACUTE SINUSITIS WITH SYMPTOMS > 10 DAYS: Primary | ICD-10-CM

## 2018-03-16 DIAGNOSIS — G47.33 OSA (OBSTRUCTIVE SLEEP APNEA): ICD-10-CM

## 2018-03-16 DIAGNOSIS — J20.9 ACUTE BRONCHITIS WITH SYMPTOMS GREATER THAN 10 DAYS: ICD-10-CM

## 2018-03-16 DIAGNOSIS — Z51.81 MEDICATION MONITORING ENCOUNTER: ICD-10-CM

## 2018-03-16 DIAGNOSIS — R73.03 PREDIABETES: ICD-10-CM

## 2018-03-16 DIAGNOSIS — K21.9 GASTROESOPHAGEAL REFLUX DISEASE WITHOUT ESOPHAGITIS: ICD-10-CM

## 2018-03-16 DIAGNOSIS — H61.23 BILATERAL IMPACTED CERUMEN: ICD-10-CM

## 2018-03-16 PROCEDURE — 99214 OFFICE O/P EST MOD 30 MIN: CPT | Performed by: FAMILY MEDICINE

## 2018-03-16 RX ORDER — AZITHROMYCIN 250 MG/1
TABLET, FILM COATED ORAL
Qty: 6 TABLET | Refills: 0 | Status: SHIPPED | OUTPATIENT
Start: 2018-03-16 | End: 2018-04-21

## 2018-03-16 NOTE — PATIENT INSTRUCTIONS
Zithromax prescription given -- start taking this if your symptoms don't improve after you complete Augmentin - two tablets the first day and one tablet the next 4 days    Use OTC Mucinex DM and warm showers to help with congestion    Push fluids and rest    Follow up if symptoms worsen or don't improve     Follow up for fasting physical when your symptoms improve       [facility] [place] [facility]                        To reach your care team during and after hours:   [phone]  To reach our pharmacy:        [phone]    Clinic Hours                        Our clinic hours are:    Monday   7:30 am to 7:00 pm                  Tuesday through Friday 7:30 am to 5:00 pm                             Saturday   8:00 am to 12:00 pm      Sunday   Closed      Pharmacy Hours                        Our pharmacy hours are:    Monday   8:30 am to 7:00 pm       Tuesday to Friday  8:30 am to 6:00 pm                       Saturday    9:00 am to 1:00 pm              Sunday    Closed              There is also information available at our web site:  [email or web address]    If your provider ordered any lab tests and you do not receive the results within 10 business days, please call the clinic.    If you need a medication refill please contact your pharmacy.  Please allow 2-3 business days for your refill to be completed.    Our clinic offers telephone visits and e visits.  Please ask one of your team members to explain more.      Use [employer] (secure email communication and access to your chart) to send your primary care provider a message or make an appointment. Ask someone on your Team how to sign up for [employer].  Immunizations                      Immunization History   Administered Date(s) Administered     Influenza (High Dose) 3 valent vaccine 11/07/2014, 12/31/2015, 11/01/2016, 10/17/2017     Pneumo Conj 13-V (2010&after) 12/31/2015     Pneumococcal 23 valent 05/05/2014     TD (ADULT, 7+) 02/10/1998, 04/19/2011        Health  Maintenance                         Health Maintenance Due   Topic Date Due     Medicare Annual Wellness Visit  05/24/1966     Hepatitis C Screening  05[birth year]24/1968     Cholesterol Lab - yearly  02/17/2017     Prostate Test (PSA) - yearly  02/17/2017

## 2018-03-16 NOTE — MR AVS SNAPSHOT
After Visit Summary   3/16/2018    Edis Beltre    MRN: [id number]           Patient Information     Date Of Birth          1950        Visit Information        Provider Department      3/16/2018 9:00 AM Nato Ceballos MD [facility] [place]        Today's Diagnoses     Acute sinusitis with symptoms > 10 days    -  1    Acute bronchitis with symptoms greater than 10 days        Prediabetes        Gastroesophageal reflux disease without esophagitis        LAURA (obstructive sleep apnea)        Medication monitoring encounter          Care Instructions    Zithromax prescription given -- start taking this if your symptoms don't improve after you complete Augmentin - two tablets the first day and one tablet the next 4 days    Use OTC Mucinex DM and warm showers to help with congestion    Push fluids and rest    Follow up if symptoms worsen or don't improve     Follow up for fasting physical when your symptoms improve       [facility] [place]                        To reach your care team during and after hours:   [phone]  To reach our pharmacy:        [phone]    Clinic Hours                        Our clinic hours are:    Monday   7:30 am to 7:00 pm                  Tuesday through Friday 7:30 am to 5:00 pm                             Saturday   8:00 am to 12:00 pm      Sunday   Closed      Pharmacy Hours                        Our pharmacy hours are:    Monday   8:30 am to 7:00 pm       Tuesday to Friday  8:30 am to 6:00 pm                       Saturday    9:00 am to 1:00 pm              Sunday    Closed              There is also information available at our web site:  [email or web address]    If your provider ordered any lab tests and you do not receive the results within 10 business days, please call the clinic.    If you need a medication refill please contact your pharmacy.  Please allow 2-3 business days for your refill to be completed.    Our clinic offers telephone  visits and e visits.  Please ask one of your team members to explain more.      Use WaysGo (secure email communication and access to your chart) to send your primary care provider a message or make an appointment. Ask someone on your Team how to sign up for Ass[employer].  Immunizations                      Immunization History   Administered Date(s) Administered     Influenza (High Dose) 3 valent vaccine 11/07/2014, 12/31/2015, 11/01/2016, 10/17/2017     Pneumo Conj 13-V (2010&after) 12/31/2015     Pneumococcal 23 valent 05/05/2014     TD (ADULT, 7+) 02/10/1998, 04/19/2011        Health Maintenance                         Health Maintenance Due   Topic Date Due     Medicare Annual Wellness Visit  05/24/1966     Hepatitis C Screening  05/24/1966     Cholesterol Lab - yearly  02/17/2017     Prostate Test (PSA) - yearly  02/17/2017               Follow-ups after your visit        Follow-up notes from your care team     Return if symptoms worsen or fail to improve.      Who to contact     If you have questions or need follow up information about today's clinic visit or your schedule please contact [facility] directly at [phone].  Normal or non-critical lab and imaging results will be communicated to you by MyChart, letter or phone within 4 business days after the clinic has received the results. If you do not hear from us within 7 days, please contact the clinic through [employer] or phone. If you have a critical or abnormal lab result, we will notify you by phone as soon as possible.  Submit refill requests through WaysGo or call your pharmacy and they will forward the refill request to us. Please allow 3 business days for your refill to be completed.          Additional Information About Your Visit        MyChart Information     WaysGo gives you secure access to your electronic health record. If you see a primary care provider, you can also send messages to your care team and make appointments. If you  have questions, please call your primary care clinic.  If you do not have a primary care provider, please call [phone] and they will assist you.        Care EveryWhere ID     This is your Care EveryWhere ID. This could be used by other organizations to access your [facility] medical records  [id number]        Your Vitals Were     Pulse Temperature Respirations Height Pulse Oximetry BMI (Body Mass Index)    84 98.6  F (37  C) (Tympanic) 12 6' (1.829 m) 97% 40.69 kg/m2       Blood Pressure from Last 3 Encounters:   03/16/18 136/78   02/27/18 120/80   02/23/18 149/75    Weight from Last 3 Encounters:   03/16/18 300 lb (136.1 kg)   02/27/18 299 lb (135.6 kg)   02/23/18 (!) 302 lb (137 kg)              Today, you had the following     No orders found for display         Today's Medication Changes          These changes are accurate as of 3/16/18  9:33 AM.  If you have any questions, ask your nurse or doctor.               Start taking these medicines.        Dose/Directions    azithromycin 250 MG tablet   Commonly known as:  ZITHROMAX   Used for:  Acute sinusitis with symptoms > 10 days, Acute bronchitis with symptoms greater than 10 days   Started by:  Nato Ceballos MD        2 tabs day 1 and the 1 tab daily for 4 more days   Quantity:  6 tablet   Refills:  0            Where to get your medicines      Some of these will need a paper prescription and others can be bought over the counter.  Ask your nurse if you have questions.     Bring a paper prescription for each of these medications     azithromycin 250 MG tablet                Primary Care Provider Office Phone # Fax #    Pedro Brasher [phone] [address] [facility] [address]        Equal Access to Services     [facility] : Ricardo Jones, ivone soria, edgardo arzate. So [facility] [phone].    ATENCIÓN: Si habla español, tiene a medel disposición  servicios gratuitos de asistencia lingüística. Chichi tate [phone].    We comply with applicable federal civil rights laws and [place] laws. We do not discriminate on the basis of race, color, national origin, age, disability, sex, sexual orientation, or gender identity.            Thank you!     Thank you for choosing [facility]  for your care. Our goal is always to provide you with excellent care. Hearing back from our patients is one way we can continue to improve our services. Please take a few minutes to complete the written survey that you may receive in the mail after your visit with us. Thank you!             Your Updated Medication List - Protect others around you: Learn how to safely use, store and throw away your medicines at www.disposemymeds.org.          This list is accurate as of 3/16/18  9:33 AM.  Always use your most recent med list.                   Brand Name Dispense Instructions for use Diagnosis    ACIDOPHILUS PROBIOTIC BLEND Caps      Take 1 capsule by mouth daily    Diarrhea, unspecified type       amoxicillin-clavulanate 875-125 MG per tablet    AUGMENTIN    20 tablet    Take 1 tablet by mouth 2 times daily    Acute sinusitis with symptoms > 10 days       aspirin 81 MG tablet     30 tablet    Take 81 mg by mouth daily        azithromycin 250 MG tablet    ZITHROMAX    6 tablet    2 tabs day 1 and the 1 tab daily for 4 more days    Acute sinusitis with symptoms > 10 days, Acute bronchitis with symptoms greater than 10 days       Daily Multivitamin Tabs      Take 1 tablet by mouth daily        fish oil-omega-3 fatty acids 1000 MG capsule      Take 2 g by mouth daily        guaiFENesin 600 MG 12 hr tablet    MUCINEX    28 tablet    Take 1 tablet (600 mg) by mouth 2 times daily    Acute non-recurrent sinusitis, unspecified location       order for DME     1 Device    Equipment being ordered: CPAP and all needed supplies.    Hypersomnia with sleep apnea, unspecified        pantoprazole 40 MG EC tablet    PROTONIX     Take 40 mg by mouth every 3 days

## 2018-03-16 NOTE — NURSING NOTE
Chief Complaint   Patient presents with     URI       Initial /78  Pulse 84  Temp 98.6  F (37  C) (Tympanic)  Resp 12  Ht 6' (1.829 m)  Wt 300 lb (136.1 kg)  SpO2 97%  BMI 40.69 kg/m2 Estimated body mass index is 40.69 kg/(m^2) as calculated from the following:    Height as of this encounter: 6' (1.829 m).    Weight as of this encounter: 300 lb (136.1 kg).  Medication Reconciliation: complete   Lia Scott LPN

## 2018-04-04 ENCOUNTER — TRANSFERRED RECORDS (OUTPATIENT)
Dept: HEALTH INFORMATION MANAGEMENT | Facility: CLINIC | Age: 68
End: 2018-04-04

## 2018-04-21 ENCOUNTER — HOSPITAL ENCOUNTER (OUTPATIENT)
Facility: CLINIC | Age: 68
Setting detail: OBSERVATION
Discharge: HOME | End: 2018-04-22
Attending: EMERGENCY MEDICINE | Admitting: INTERNAL MEDICINE
Payer: MEDICARE

## 2018-04-21 ENCOUNTER — APPOINTMENT (OUTPATIENT)
Dept: GENERAL RADIOLOGY | Facility: CLINIC | Age: 68
End: 2018-04-21
Attending: EMERGENCY MEDICINE
Payer: MEDICARE

## 2018-04-21 DIAGNOSIS — R00.2 PALPITATIONS: ICD-10-CM

## 2018-04-21 DIAGNOSIS — R07.9 ACUTE CHEST PAIN: ICD-10-CM

## 2018-04-21 DIAGNOSIS — R68.84 JAW PAIN: Primary | ICD-10-CM

## 2018-04-21 DIAGNOSIS — M62.81 GENERALIZED MUSCLE WEAKNESS: ICD-10-CM

## 2018-04-21 LAB
ALBUMIN SERPL-MCNC: 3.7 G/DL (ref 3.4–5)
ALBUMIN UR-MCNC: NEGATIVE MG/DL
ALP SERPL-CCNC: 65 U/L (ref 40–150)
ALT SERPL W P-5'-P-CCNC: 19 U/L (ref 0–70)
ANION GAP SERPL CALCULATED.3IONS-SCNC: 9 MMOL/L (ref 3–14)
APPEARANCE UR: CLEAR
AST SERPL W P-5'-P-CCNC: 28 U/L (ref 0–45)
BASOPHILS # BLD AUTO: 0 10E9/L (ref 0–0.2)
BASOPHILS NFR BLD AUTO: 0.5 %
BILIRUB SERPL-MCNC: 0.9 MG/DL (ref 0.2–1.3)
BILIRUB UR QL STRIP: NEGATIVE
BUN SERPL-MCNC: 17 MG/DL (ref 7–30)
CALCIUM SERPL-MCNC: 8.7 MG/DL (ref 8.5–10.1)
CHLORIDE SERPL-SCNC: 107 MMOL/L (ref 94–109)
CO2 SERPL-SCNC: 25 MMOL/L (ref 20–32)
COLOR UR AUTO: YELLOW
CREAT SERPL-MCNC: 1.05 MG/DL (ref 0.66–1.25)
DIFFERENTIAL METHOD BLD: NORMAL
EOSINOPHIL # BLD AUTO: 0.2 10E9/L (ref 0–0.7)
EOSINOPHIL NFR BLD AUTO: 3.5 %
ERYTHROCYTE [DISTWIDTH] IN BLOOD BY AUTOMATED COUNT: 14 % (ref 10–15)
GFR SERPL CREATININE-BSD FRML MDRD: 70 ML/MIN/1.7M2
GLUCOSE SERPL-MCNC: 125 MG/DL (ref 70–99)
GLUCOSE UR STRIP-MCNC: NEGATIVE MG/DL
HCT VFR BLD AUTO: 46.9 % (ref 40–53)
HGB BLD-MCNC: 15.8 G/DL (ref 13.3–17.7)
HGB UR QL STRIP: NEGATIVE
IMM GRANULOCYTES # BLD: 0 10E9/L (ref 0–0.4)
IMM GRANULOCYTES NFR BLD: 0.2 %
INTERPRETATION ECG - MUSE: NORMAL
KETONES UR STRIP-MCNC: NEGATIVE MG/DL
LACTATE BLD-SCNC: 1.6 MMOL/L (ref 0.7–2)
LEUKOCYTE ESTERASE UR QL STRIP: NEGATIVE
LYMPHOCYTES # BLD AUTO: 1 10E9/L (ref 0.8–5.3)
LYMPHOCYTES NFR BLD AUTO: 17.9 %
MCH RBC QN AUTO: 32 PG (ref 26.5–33)
MCHC RBC AUTO-ENTMCNC: 33.7 G/DL (ref 31.5–36.5)
MCV RBC AUTO: 95 FL (ref 78–100)
MONOCYTES # BLD AUTO: 0.6 10E9/L (ref 0–1.3)
MONOCYTES NFR BLD AUTO: 10.8 %
MUCOUS THREADS #/AREA URNS LPF: PRESENT /LPF
NEUTROPHILS # BLD AUTO: 3.9 10E9/L (ref 1.6–8.3)
NEUTROPHILS NFR BLD AUTO: 67.1 %
NITRATE UR QL: NEGATIVE
NRBC # BLD AUTO: 0 10*3/UL
NRBC BLD AUTO-RTO: 0 /100
NT-PROBNP SERPL-MCNC: 40 PG/ML (ref 0–900)
PH UR STRIP: 6 PH (ref 5–7)
PLATELET # BLD AUTO: 275 10E9/L (ref 150–450)
POTASSIUM SERPL-SCNC: 3.8 MMOL/L (ref 3.4–5.3)
PROT SERPL-MCNC: 7.5 G/DL (ref 6.8–8.8)
RBC # BLD AUTO: 4.94 10E12/L (ref 4.4–5.9)
RBC #/AREA URNS AUTO: <1 /HPF (ref 0–2)
SODIUM SERPL-SCNC: 141 MMOL/L (ref 133–144)
SOURCE: ABNORMAL
SP GR UR STRIP: 1.02 (ref 1–1.03)
TROPONIN I SERPL-MCNC: <0.015 UG/L (ref 0–0.04)
TROPONIN I SERPL-MCNC: <0.015 UG/L (ref 0–0.04)
TSH SERPL DL<=0.005 MIU/L-ACNC: 1.5 MU/L (ref 0.4–4)
UROBILINOGEN UR STRIP-MCNC: 0 MG/DL (ref 0–2)
WBC # BLD AUTO: 5.8 10E9/L (ref 4–11)
WBC #/AREA URNS AUTO: 1 /HPF (ref 0–5)

## 2018-04-21 PROCEDURE — 99285 EMERGENCY DEPT VISIT HI MDM: CPT | Mod: 25

## 2018-04-21 PROCEDURE — 83605 ASSAY OF LACTIC ACID: CPT | Performed by: EMERGENCY MEDICINE

## 2018-04-21 PROCEDURE — 84484 ASSAY OF TROPONIN QUANT: CPT | Performed by: EMERGENCY MEDICINE

## 2018-04-21 PROCEDURE — A9270 NON-COVERED ITEM OR SERVICE: HCPCS | Mod: GY | Performed by: EMERGENCY MEDICINE

## 2018-04-21 PROCEDURE — 83880 ASSAY OF NATRIURETIC PEPTIDE: CPT | Performed by: EMERGENCY MEDICINE

## 2018-04-21 PROCEDURE — G0378 HOSPITAL OBSERVATION PER HR: HCPCS

## 2018-04-21 PROCEDURE — 71046 X-RAY EXAM CHEST 2 VIEWS: CPT

## 2018-04-21 PROCEDURE — 36415 COLL VENOUS BLD VENIPUNCTURE: CPT | Performed by: PHYSICIAN ASSISTANT

## 2018-04-21 PROCEDURE — 84484 ASSAY OF TROPONIN QUANT: CPT | Performed by: PHYSICIAN ASSISTANT

## 2018-04-21 PROCEDURE — 84443 ASSAY THYROID STIM HORMONE: CPT | Performed by: EMERGENCY MEDICINE

## 2018-04-21 PROCEDURE — 93005 ELECTROCARDIOGRAM TRACING: CPT

## 2018-04-21 PROCEDURE — 99219 ZZC INITIAL OBSERVATION CARE,LEVL II: CPT | Performed by: PHYSICIAN ASSISTANT

## 2018-04-21 PROCEDURE — 80053 COMPREHEN METABOLIC PANEL: CPT | Performed by: EMERGENCY MEDICINE

## 2018-04-21 PROCEDURE — 25000132 ZZH RX MED GY IP 250 OP 250 PS 637: Mod: GY | Performed by: EMERGENCY MEDICINE

## 2018-04-21 PROCEDURE — 85025 COMPLETE CBC W/AUTO DIFF WBC: CPT | Performed by: EMERGENCY MEDICINE

## 2018-04-21 PROCEDURE — 81001 URINALYSIS AUTO W/SCOPE: CPT | Performed by: EMERGENCY MEDICINE

## 2018-04-21 RX ORDER — ACETAMINOPHEN 650 MG/1
650 SUPPOSITORY RECTAL EVERY 4 HOURS PRN
Status: DISCONTINUED | OUTPATIENT
Start: 2018-04-21 | End: 2018-04-22 | Stop reason: HOSPADM

## 2018-04-21 RX ORDER — NITROGLYCERIN 0.4 MG/1
0.4 TABLET SUBLINGUAL EVERY 5 MIN PRN
Status: DISCONTINUED | OUTPATIENT
Start: 2018-04-21 | End: 2018-04-22 | Stop reason: HOSPADM

## 2018-04-21 RX ORDER — ASPIRIN 81 MG/1
162 TABLET, CHEWABLE ORAL ONCE
Status: COMPLETED | OUTPATIENT
Start: 2018-04-21 | End: 2018-04-21

## 2018-04-21 RX ORDER — NALOXONE HYDROCHLORIDE 0.4 MG/ML
.1-.4 INJECTION, SOLUTION INTRAMUSCULAR; INTRAVENOUS; SUBCUTANEOUS
Status: DISCONTINUED | OUTPATIENT
Start: 2018-04-21 | End: 2018-04-22 | Stop reason: HOSPADM

## 2018-04-21 RX ORDER — ACETAMINOPHEN 325 MG/1
650 TABLET ORAL EVERY 4 HOURS PRN
Status: DISCONTINUED | OUTPATIENT
Start: 2018-04-21 | End: 2018-04-22 | Stop reason: HOSPADM

## 2018-04-21 RX ORDER — ALUMINA, MAGNESIA, AND SIMETHICONE 2400; 2400; 240 MG/30ML; MG/30ML; MG/30ML
30 SUSPENSION ORAL EVERY 4 HOURS PRN
Status: DISCONTINUED | OUTPATIENT
Start: 2018-04-21 | End: 2018-04-22 | Stop reason: HOSPADM

## 2018-04-21 RX ORDER — ASPIRIN 81 MG/1
81 TABLET ORAL DAILY
Status: DISCONTINUED | OUTPATIENT
Start: 2018-04-22 | End: 2018-04-22 | Stop reason: HOSPADM

## 2018-04-21 RX ORDER — LIDOCAINE 40 MG/G
CREAM TOPICAL
Status: DISCONTINUED | OUTPATIENT
Start: 2018-04-21 | End: 2018-04-22 | Stop reason: HOSPADM

## 2018-04-21 RX ADMIN — ASPIRIN 81 MG 162 MG: 81 TABLET ORAL at 08:10

## 2018-04-21 ASSESSMENT — ENCOUNTER SYMPTOMS
NAUSEA: 0
BLOOD IN STOOL: 0
FATIGUE: 1
ABDOMINAL PAIN: 0
DIARRHEA: 1

## 2018-04-21 NOTE — PLAN OF CARE
Problem: Patient Care Overview  Goal: Plan of Care/Patient Progress Review  PRIMARY DIAGNOSIS: GENERALIZED WEAKNESS    OUTPATIENT/OBSERVATION GOALS TO BE MET BEFORE DISCHARGE  1. Orthostatic performed: No, not ordered    2. Tolerating PO medications: Yes    3. Return to near baseline physical activity: Yes    4. Cleared for discharge by consultants (if involved): N/A    Discharge Planner Nurse   Safe discharge environment identified: Yes  Barriers to discharge: No    Pt is A&Ox4, VSS. Tele running sinus rhythm per tele tech. Pt is up independently in room. Regular diet, no caffeine. PIV saline locked. Lung sounds clear, heart sounds normal, bowel sounds present, last BM yesterday, voiding well, trace edema bilaterally in lower extremities. Numbness and tingling in both feet - chronic per pt. Denies any pain/chest pain, SOB, dizziness, lightheadedness, palpitations. Plan is to monitor on tele overnight and discharge home tomorrow - possibly with holter monitor. Nuclear stress test outpatient - possibly. Will continue to monitor and assess.      Please review provider order for any additional goals.   Nurse to notify provider when observation goals have been met and patient is ready for discharge.

## 2018-04-21 NOTE — PROGRESS NOTES
ROOM # [id number]    Living Situation (if not independent, order SW consult): ind w/ wife   Facility name: n/a  : An (wife)    Activity level at baseline: ind  Activity level on admit: ind      Patient registered to observation; given Patient Bill of Rights; given the opportunity to ask questions about observation status and their plan of care.  Patient has been oriented to the observation room, bathroom and call light is in place.    Discussed discharge goals and expectations with patient/family.

## 2018-04-21 NOTE — PHARMACY-ADMISSION MEDICATION HISTORY
Admission medication history interview status for this patient is complete. See [facility] admission navigator for allergy information, prior to admission medications and immunization status.     Medication history interview source(s):Patient  Medication history resources (including written lists, pill bottles, clinic record):None  [facility] pharmacy[facility]     Changes made to PTA medication list:  Added: none   Deleted: Augmentin, Z pack, guaifenesin   Changed: pantoprazole 40 mg every three days to daily prn     Actions taken by pharmacist (provider contacted, etc):None     Additional medication history information:None    Medication reconciliation/reorder completed by provider prior to medication history? No    Do you take OTC medications (eg tylenol, ibuprofen, fish oil, eye/ear drops, etc)? N(Y/N)    For patients on insulin therapy: N (Y/N)  Lantus/levemir/NPH/Mix 70/30 dose:   (Y/N) (see Med list for doses)   Sliding scale Novolog Y/N  If Yes, do you have a baseline novolog pre-meal dose:  units with meals  Patients eat three meals a day:   Y/N    How many episodes of hypoglycemia do you have per week: _______  How many missed doses do you have per week: ______  How many times do you check your blood glucose per day: _______   Any Barriers to therapy - Be specific :  cost of medications, comfortable with giving injections (if applicable), comfortable and confident with current diabetes regimen: Y/N ______________      Prior to Admission medications    Medication Sig Last Dose Taking? Auth Provider   aspirin 81 MG tablet Take 81 mg by mouth daily  4/20/2018 at AM Yes Pedro Brasher MD   DAILY MULTIVITAMIN OR TABS Take 1 tablet by mouth daily 4/20/2018 at AM Yes Reported, Patient   fish oil-omega-3 fatty acids 1000 MG capsule Take 2 g by mouth daily 4/20/2018 at AM Yes Unknown, Entered By History   pantoprazole (PROTONIX) 40 MG EC tablet Take 40 mg by mouth daily as needed  4/21/2018 at AM Yes Unknown,  Entered By History   Probiotic Product (ACIDOPHILUS PROBIOTIC BLEND) CAPS Take 1 capsule by mouth daily 4/20/2018 at AM Yes Pedro Brasher MD

## 2018-04-21 NOTE — PLAN OF CARE
"Problem: Patient Care Overview  Goal: Plan of Care/Patient Progress Review  Outcome: No Change  PRIMARY DIAGNOSIS: CHEST PAIN  OUTPATIENT/OBSERVATION GOALS TO BE MET BEFORE DISCHARGE:  1. Ruled out acute coronary syndrome (negative or stable Troponin):  Negative x1, awaiting second draw  2. Pain Status: Pain free.  3. Appropriate provocative testing performed: N/A  - Stress Test Procedure: none ordered at this time  - Interpretation of cardiac rhythm per telemetry tech: SB/SR with ST depression (PA aware)    4. Cleared by Consultants (if applicable):N?A   5. Return to near baseline physical activity: Yes  Discharge Planner Nurse   Safe discharge environment identified: Yes  Barriers to discharge: No       Entered by: Jessie Soler 04/21/2018 12:47 PM      VSS, BP slightly elevated. Pt denies CP or SOB. Pt endorses 6 months of loose stools, and feeling \"more gassy\" last few months, Pt stated he tested negative for c-diff recently. Pt states he had symptoms of fatigue, tingling in arms in past when he had palpitations in past. Pt denies these symptoms or Palpitations currently.  Plan for tele monitoring and 1 additional trop level.     Please review provider order for any additional goals.   Nurse to notify provider when observation goals have been met and patient is ready for discharge.      "

## 2018-04-21 NOTE — IP AVS SNAPSHOT
MRN:[id number]                      After Visit Summary   4/21/2018    Edis Beltre    MRN: [id number]           Thank you!     Thank you for choosing [facility] for your care. Our goal is always to provide you with excellent care. Hearing back from our patients is one way we can continue to improve our services. Please take a few minutes to complete the written survey that you may receive in the mail after you visit. If you would like to speak to someone directly about your visit please contact Patient Relations at [phone]. Thank you!          Patient Information     Date Of Birth          1950        About your hospital stay     You were admitted on:  April 21, 2018 You last received care in the:  [facility] Observation Department    You were discharged on:  April 22, 2018        Reason for your hospital stay       Flushing sensation and weakness concerning for arrhythmia. Serial troponins were negative and telemetry was unremarkable here. Discharged home with cardiac event monitor and plan for outpatient stress test.                  Who to Call     For medical emergencies, please call 911.  For non-urgent questions about your medical care, please call your primary care provider or clinic, [phone]          Attending Provider     Provider Specialty    Angel Husain MD Emergency Medicine    Maria De Jesus Mar DO Internal Medicine       Primary Care Provider Office Phone # Fax #    Pedro Brasher [phone]       When to contact your care team       Call your primary doctor if you have any of the following: temperature greater than 101, persistent palpitations, shortness of breath or chest pain.                  After Care Instructions     Activity       Your activity upon discharge: activity as tolerated            Diet       Follow this diet upon discharge: Regular                  Follow-up Appointments      "Follow-up and recommended labs and tests        Follow up with primary care provider, Pedro Brasher, within 7-14 days for hospital follow- up.  No follow up labs or test are needed.  30 day event monitor  Outpatient NM Lexiscan                             Future tests that were ordered for you     NM Lexiscan stress test                 Pending Results     Date and Time Order Name Status Description    4/22/2018 0817 Cardiac event monitor In process             Statement of Approval     Ordered          04/22/18 1021  I have reviewed and agree with all the recommendations and orders detailed in this document.  EFFECTIVE NOW     Approved and electronically signed by:  Irma Rey PA-C             Admission Information     Date & Time Provider Department Dept. Phone    4/21/2018 Maria De Jesus Mar DO [facility] Observation Department [phone]      Your Vitals Were     Blood Pressure Pulse Temperature Respirations Height Weight    143/71 (BP Location: Left arm) 58 97.6  F (36.4  C) (Oral) 16 1.803 m (5' 11\") 132.2 kg (291 lb 8 oz)    Pulse Oximetry BMI (Body Mass Index)                94% 40.66 kg/m2          LensVector Information     [employer] gives you secure access to your electronic health record. If you see a primary care provider, you can also send messages to your care team and make appointments. If you have questions, please call your primary care clinic.  If you do not have a primary care provider, please call [phone] and they will assist you.        Care EveryWhere ID     This is your Care EveryWhere ID. This could be used by other organizations to access your [place] medical records  [id number]        Equal Access to Services     [facility]: Hadkavitha phillips Sokamille, waaxda luqadaha, qaybta kaalmada adebruna, edgardo prado. So [facility] [phone].    ATENCIÓN: Si habla español, tiene a medel disposición servicios gratuitos de asistencia " lingüísticaRakesh Cadet al [phone].    We comply with applicable federal civil rights laws and [place] laws. We do not discriminate on the basis of race, color, national origin, age, disability, sex, sexual orientation, or gender identity.               Review of your medicines      CONTINUE these medicines which have NOT CHANGED        Dose / Directions    ACIDOPHILUS PROBIOTIC BLEND Caps   Used for:  Diarrhea, unspecified type        Dose:  1 capsule   Take 1 capsule by mouth daily   Refills:  0       aspirin 81 MG tablet        Dose:  81 mg   Take 81 mg by mouth daily   Quantity:  30 tablet   Refills:  0       Daily Multivitamin Tabs        Take 1 tablet by mouth daily   Refills:  0       fish oil-omega-3 fatty acids 1000 MG capsule        Dose:  2 g   Take 2 g by mouth daily   Refills:  0       pantoprazole 40 MG EC tablet   Commonly known as:  PROTONIX        Dose:  40 mg   Take 40 mg by mouth daily as needed   Refills:  0                Protect others around you: Learn how to safely use, store and throw away your medicines at www.disposemymeds.org.             Medication List: This is a list of all your medications and when to take them. Check marks below indicate your daily home schedule. Keep this list as a reference.      Medications           Morning Afternoon Evening Bedtime As Needed    ACIDOPHILUS PROBIOTIC BLEND Caps   Take 1 capsule by mouth daily   Next Dose Due:  As before                                aspirin 81 MG tablet   Take 81 mg by mouth daily   Next Dose Due:  4/23/18 AM                                Daily Multivitamin Tabs   Take 1 tablet by mouth daily   Next Dose Due:  As before                                fish oil-omega-3 fatty acids 1000 MG capsule   Take 2 g by mouth daily   Next Dose Due:  As before                                pantoprazole 40 MG EC tablet   Commonly known as:  PROTONIX   Take 40 mg by mouth daily as needed   Next Dose Due:  As before                                           More Information         *CHEST PAIN, UNCERTAIN CAUSE    Based on your exam today, the exact cause of your chest pain is not certain. Your condition does not seem serious at this time, and your pain does not appear to be coming from your heart. However, sometimes the signs of a serious problem take more time to appear. Therefore, watch for the warning signs listed below.  HOME CARE:    1. Rest today and avoid strenuous activity.  2. Take any prescribed medicine as directed.  FOLLOW UP with your doctor in 1-3 days.   GET PROMPT MEDICAL ATTENTION if any of the following occur:    A change in the type of pain: if it feels different, becomes more severe, lasts longer, or begins to spread into your shoulder, arm, neck, jaw or back    Shortness of breath or increased pain with breathing    Weakness, dizziness, or fainting    Cough with blood or dark colored sputum (phlegm)    Fever over 101  F (38.3  C)    Swelling, pain or redness in one leg    4901-8419 [employer]. [address] [place] [address], [place] [id number]. All rights reserved. This information is not intended as a substitute for professional medical care. Always follow your healthcare professional's instructions.  This information has been modified by your health care provider with permission from the publisher.

## 2018-04-21 NOTE — ED NOTES
[facility]  ED Nurse Handoff Report    Edis Beltre is a 67 year old male   ED Chief complaint: Fatigue  . ED Diagnosis:   Final diagnoses:   Generalized muscle weakness - possible palpitations     Allergies:   Allergies   Allergen Reactions     Atorvastatin      Other reaction(s): Muscle Aches/Weakness       Code Status: Full Code  Activity level - Baseline/Home:  Independent. Activity Level - Current:   Independent. Lift room needed: No. Bariatric: No   Needed: No   Isolation: Yes. Infection: Not Applicable.     Vital Signs:   Vitals:    04/21/18 0845 04/21/18 0900 04/21/18 0915 04/21/18 0930   BP: 151/80 130/74 137/77 135/75   Pulse:       Resp:  11 14 (!) 0   Temp:       TempSrc:       SpO2: 93% 96% 95% 95%       Cardiac Rhythm:  ,   Cardiac  Cardiac Rhythm: Normal sinus rhythm  Pain level: 0-10 Pain Scale: 0  Patient confused: Yes. Patient Falls Risk: Yes.   Elimination Status: Has voided   Patient Report - Initial Complaint: Chest abdnormality . Focused Assessment:  Cardiac - Cardiac WDL:  WDL except JVD (Jugular Venous Distention): absent Capillary Refill, General: unable to assess Cardiac Comment: Pt states that has a funny feeling in chest.  Review of Systems (Cardiac) - Cardiac Signs/Symptoms: denies  Chest Pain Assessment - Rating (0-10): 0 Chest Pain Reproducible?: No  Cardiac Monitoring - EKG Monitoring: Yes Cardiac Regularity: Regular Cardiac Rhythm: NSR  Tests Performed:   Labs Ordered and Resulted from Time of ED Arrival Up to the Time of Departure from the ED   COMPREHENSIVE METABOLIC PANEL - Abnormal; Notable for the following:        Result Value    Glucose 125 (*)     All other components within normal limits   ROUTINE UA WITH MICROSCOPIC - Abnormal; Notable for the following:     Mucous Urine Present (*)     All other components within normal limits   CBC WITH PLATELETS DIFFERENTIAL   TROPONIN I   TSH WITH FREE T4 REFLEX   LACTIC ACID WHOLE BLOOD   NT PROBNP INPATIENT    PERIPHERAL IV CATHETER   CARDIAC CONTINUOUS MONITORING     XR Chest 2 Views   Final Result   IMPRESSION: Hiatal hernia and scarring in left lung base is present on   the current exam. There is also a questionable small infiltrate in the   right lung base but this is not confirmed on the lateral and could   just be due to overlapping soft tissue. Upper lung zones are clear. No   pleural effusions. Pulmonary vasculature is normal. Heart size is   normal.      MILAGROS DIEHL MD        . Abnormal Results: n/a  Treatments provided: ASA  Family Comments: wife at bedside  OBS brochure/video discussed/provided to patient:  Yes  ED Medications:   Medications   aspirin chewable tablet 162 mg (162 mg Oral Given 4/21/18 0810)     Drips infusing:  No  For the majority of the shift, the patient's behavior Green. Interventions performed were monitor.     Severe Sepsis OR Septic Shock Diagnosis Present: No  Due to heart hx pt coming in for observation.       ED Nurse Name/Phone Number: Deepak Saucedo,   9:46 AM    RECEIVING UNIT ED HANDOFF REVIEW    Above ED Nurse Handoff Report was reviewed: Yes  Reviewed by: Jessie Soler on April 21, 2018 at 10:44 AM

## 2018-04-21 NOTE — H&P
[facility] Outpatient / Observation Unit  History and Physical Exam     Edis Beltre MRN# [id number]   YOB: 1950 Age: 67 year old      Date of Admission:  4/21/2018    Primary care provider: Pedro Brasher          Assessment:   Edis Beltre is a 67 year old male with a PMH significant for hx of PVCs and NSVT, s/p ablation in 2016, LAURA, HLP and GERD, who presents with symptoms similar to previous episodes of PVCs and NSVT.   Work up in ED reveals: Vital signs are stable.  Labs in the ED are unremarkable, which includes CMP, lactic acid, BNP, troponin, TSH, CBC and UA.  Chest x-ray shows a questionable infiltrate in the right lung base but this is not confirmed on lateral views, otherwise clear. EKG - SR with nonspecific ST abnormality, abnormal QRS-T angle, consider primary T-wave abnormality.  He received 162 mg p.o. aspirin in the ED.  Patient is being registered to observation for further evaluation and to rule out possible ACS.     1. Palpitations -patient does not quite describe these episodes of palpitations, but states symptoms are the same that he had prior to his ablation.  He states he is a flushing sensation and feels buzzed.  Episode left 15-20 seconds.  He denies chest pain or significant shortness of breath, notes fatigue afterwards.  History of ablation for frequent PVCs and NSVT.  Initial EKG in the ED did not reveal an arrhythmia and initial troponin is negative continue to monitor on telemetry, will check 1 more troponin.  If no arrhythmia is detected and troponin remains negative, consider discharge home tomorrow with a cardiac event monitor.  Patient had nondiagnostic stress echo due to job pain in the setting of acute sinusitis in February, consider outpatient Lexiscan.  If arrhythmia is detected, consider touching base with cardiology.  2. HLP -not on a statin, takes fish oil with omega-3, resume on discharge.  3. LAURA -uses CPAP, may use here  4. GERD -resume home meds              Plan:     1. [place] to Observation  2. Continue telemetry  3. Follow serial troponins  4. Consider Outpatient stress testing with Lexiscan Stress Thallium test  5. Cont Aspirin EC 81 mg po daily  6. Blood pressure control  7. Morphine and nitroglycerine PRN for pain    8. regular diet, No caffeine if Nuclear testing selected  9. DVT prophylaxis: pt at low risk, encourage ambulation  10. Code Status: full code  11. Dispo: anticipate discharge home tomorrow unless significantly movement is intact                  Chief Complaint:   Palpitations         History of Present Illness:   Edis Beltre is a 67 year old male with a PMH significant for hx of PVCs and NSVT, s/p ablation in 2016, LAURA, HLP and GERD, who presents with symptoms similar to previous episodes of PVCs and NSVT. Patient reports 3 episodes of a flushing sensation with dizziness in the past 2 days.  First episode occurred while he was working in his garage.  He noted a constant flushing sensation come over him as well as feeling lightheaded and bugs.  He states his symptoms lasted 15-20 seconds and then spontaneously resolved after having a bowel movement.  After this he became significantly fatigued and did go to lay down and take a significantly longer nap than normal.  He had a second episode while watching the [employer] game later that night.  In the third episode occurred this morning while he was working on his computer prompting him to come to the ED.  He denies associated chest pain or shortness of breath.  He denies fever, chills, cough, nausea, vomiting, abdominal pain, diarrhea, constipation or dysuria.  Of note, he was admitted to the hospital in February for chest pain rule out due to jaw pain.  He had a sinus infection at the time and his symptoms were thought to be related to this.  A stress echocardiogram was attempted and he was unable to achieve target workload so the test was technically nondiagnostic.  He states his current symptoms  of the same type of symptoms he had several years ago that led him to an ablation.              Past Medical History:     Past Medical History:   Diagnosis Date     Arthritis      Calcaneal spur      Esophageal reflux      FAMILY HX GI MALIGNANCY 10/1/2007     Gastro-oesophageal reflux disease      NSVT (nonsustained ventricular tachycardia) (H)     on holter 2/2016     Other chronic pain     Joint pain for 30 years.     Personal history of colonic polyps     adenoma - colonoscopy due 2012     Pneumothorax     recurrent, surgically treated     Premature ventricular contraction     ablation 2/26/2016     Pure hypercholesterolemia      Sleep apnea     CPAP               Past Surgical History:     Past Surgical History:   Procedure Laterality Date     ANKLE SURGERY      left     ARTHROPLASTY KNEE  4/14/2014    Procedure: Left Total Knee Arthroplasty ;  Surgeon: Gallito Willis MD;  Location: [facility] OR     BIOPSY      Skin doctor     CHOLECYSTECTOMY, LAPOROSCOPIC  2007     COLONOSCOPY       EYE SURGERY  2013    Cataract surgery on both eyes     H ABLATION PVC  2/26/16     STRIP VEIN      left leg     SURGICAL HISTORY OF -       L VEIN STRIPPING     SURGICAL HISTORY OF -       L MENISCUS- ARTHROSCOPY     SURGICAL HISTORY OF -       L ANKLE SURGERY      THORACIC SURGERY  5/2014    VATS , wedge resction and mechanical pleurodesis -[place], [place]               Social History:     Social History     Social History     Marital status:      Spouse name: An     Number of children: 1     Years of education: N/A     Occupational History     retired teacher [employer]     Social History Main Topics     Smoking status: Never Smoker     Smokeless tobacco: Never Used     Alcohol use Yes      Comment: Minimal     Drug use: No     Sexual activity: Not Currently     Partners: Female     Birth control/ protection: None     Other Topics Concern      Service No     Caffeine Concern No      Sleep Concern Yes     LAURA- on CPAP     Exercise Yes     some swimming     Seat Belt Yes     Parent/Sibling W/ Cabg, Mi Or Angioplasty Before 65f 55m? No     Social History Narrative               Family History:     Family History   Problem Relation Age of Onset     Colon Cancer Father      Chronic Obstructive Pulmonary Disease Father      HEART DISEASE Mother      Has a pacemaker     Pacemaker Mother      Family History Negative Brother      Family History Negative Son      Genetic Disorder Paternal Grandmother      DIABETES No family hx of      Hypertension No family hx of      Prostate Cancer No family hx of      Coronary Artery Disease No family hx of               Allergies:      Allergies   Allergen Reactions     Atorvastatin      Other reaction(s): Muscle Aches/Weakness               Medications:     Prior to Admission medications    Medication Sig Last Dose Taking? Auth Provider   aspirin 81 MG tablet Take 81 mg by mouth daily  4/20/2018 at AM Yes Pedro Brasher MD   DAILY MULTIVITAMIN OR TABS Take 1 tablet by mouth daily 4/20/2018 at AM Yes Reported, Patient   fish oil-omega-3 fatty acids 1000 MG capsule Take 2 g by mouth daily 4/20/2018 at AM Yes Unknown, Entered By History   pantoprazole (PROTONIX) 40 MG EC tablet Take 40 mg by mouth daily as needed  4/21/2018 at AM Yes Unknown, Entered By History   Probiotic Product (ACIDOPHILUS PROBIOTIC BLEND) CAPS Take 1 capsule by mouth daily 4/20/2018 at AM Yes Pedro Brasher MD              Review of Systems:   A Comprehensive greater than 10 system review of systems was carried out.  Pertinent positives and negatives are noted above.  Otherwise negative for contributory information.     Constitutional, neuro, ENT, endocrine, pulmonary, cardiac, gastrointestinal, genitourinary, musculoskeletal, integument and psychiatric systems are negative, except as otherwise noted.           Physical Exam:   Blood pressure 142/76, pulse 73, temperature 97.7  F (36.5  C),  "temperature source Oral, resp. rate 18, height 1.803 m (5' 11\"), weight 132.2 kg (291 lb 8 oz), SpO2 96 %.    GENERAL:  Comfortable.  PSYCH: pleasant, oriented, No acute distress.  HEENT:  Atraumatic, normocephalic. Normal conjunctiva, normal hearing, and oropharynx is normal.  NECK:  Supple, no neck vein distention.  HEART:  Normal S1, S2 with no murmur, no pericardial rub, gallops or S3 or S4.  LUNGS:  Clear to auscultation, normal Respiratory effort. No wheezing, rales or ronchi.  GI:  Soft, normal bowel sounds. Non-tender, non distended.   EXTREMITIES:  No pedal edema, +2 pulses bilateral and equal.  SKIN:  Dry to touch, No rash, wound or ulcerations.  NEUROLOGIC:  CN 2-12 intact, BL 5/5 symmetric upper and lower extremity strength, sensation is intact with no focal deficits.              Data:     EKG demonstrates:  SR with nonspecific ST abnormality, abnormal QRS-T angle, consider primary T-wave abnormality      Recent Labs  Lab 04/21/18  0733   WBC 5.8   HGB 15.8   HCT 46.9   MCV 95          Recent Labs  Lab 04/21/18  0733      POTASSIUM 3.8   CHLORIDE 107   CO2 25   ANIONGAP 9   *   BUN 17   CR 1.05   GFRESTIMATED 70   GFRESTBLACK 85   EDWIN 8.7   PROTTOTAL 7.5   ALBUMIN 3.7   BILITOTAL 0.9   ALKPHOS 65   AST 28   ALT 19       Recent Labs  Lab 04/21/18  0811   NTBNPI 40       Recent Labs  Lab 04/21/18  0811   LACT 1.6       Recent Labs  Lab 04/21/18  0811   TSH 1.50       Recent Labs  Lab 04/21/18  1247 04/21/18  0733   TROPI <0.015 <0.015       Recent Labs  Lab 04/21/18  0818   COLOR Yellow   APPEARANCE Clear   URINEGLC Negative   URINEBILI Negative   URINEKETONE Negative   SG 1.017   UBLD Negative   URINEPH 6.0   PROTEIN Negative   NITRITE Negative   LEUKEST Negative   RBCU <1   WBCU 1       Recent Results (from the past 48 hour(s))   XR Chest 2 Views    Narrative    CHEST TWO VIEWS  4/21/2018 8:45 AM     HISTORY: Weakness, hypertension.     COMPARISON: 11/14/2014      Impression    " IMPRESSION: Hiatal hernia and scarring in left lung base is present on  the current exam. There is also a questionable small infiltrate in the  right lung base but this is not confirmed on the lateral and could  just be due to overlapping soft tissue. Upper lung zones are clear. No  pleural effusions. Pulmonary vasculature is normal. Heart size is  normal.    MD Irma RIVERA PA-C

## 2018-04-21 NOTE — IP AVS SNAPSHOT
[facility] Observation Department    [address]    [facility] [address]    Phone:  [phone]                                       After Visit Summary   4/21/2018    Edis Beltre    MRN: [id number]           After Visit Summary Signature Page     I have received my discharge instructions, and my questions have been answered. I have discussed any challenges I see with this plan with the nurse or doctor.    ..........................................................................................................................................  Patient/Patient Representative Signature      ..........................................................................................................................................  Patient Representative Print Name and Relationship to Patient    ..................................................               ................................................  Date                                            Time    ..........................................................................................................................................  Reviewed by Signature/Title    ...................................................              ..............................................  Date                                                            Time

## 2018-04-21 NOTE — ED TRIAGE NOTES
"Pt presents with having and abnormal heart rhythm, states that he has had an ablassion a 15 years ago. No chest pain as of this moment. Pt states that he get a \"feeling of being fatigued\" when a heart rhythm abnormality. ABC's Intact A&Ox.4   "

## 2018-04-21 NOTE — ED PROVIDER NOTES
"  History     Chief Complaint:  Fatigue      HPI   Edis Beltre is a 67 year old male with a history of PVCs status post ablation in 2016 who presents with Fatigue. The patient states that several years ago, he had similar symptoms of fatigue and had an ablation for PVCs. At that time, he did not have any palpitations. The patient states that he typically goes for two, 0.5 mile walks per day. However, yesterday he was working in his garage and had a \"buzzing\" sensation which he describes as standing under a heating lamp.  He felt fatigued and noted  tingling sensation in his bilateral arms which resolved after 15-20 seconds. The patient went inside and had a bowel movement. After the bowel movement, he did feel better, however, he was still fatigued and slept for an hour. Last night, his symptoms returned while he was watching the [employer] game. He notes that he was passing gas last night which is abnormal for him. This morning, he was using the computer when his symptoms returned again prompting his visit to the ED. He denies any palpitations or chest pain. He notes that he has had loose stools for the last month and was recently on antibiotics.         Allergies:  Atorvastatin      Medications:    Protonix  Aspirin   Multivitamin   Fish oil  Probiotic      Past Medical History:    Arthritis   Esophageal reflux  NSVT  Chronic pain  Colonic Polyps  Premature ventricular contraction   Hypercholesterolemia    Past Surgical History:    Left ankle surgery   Left total knee arthroplasty   Skin biopsy   Cataract surgery   Ablation PVC  Left leg strip vein   Left meniscus arthroscopy   Thoracic surgery     Family History:    Colon Cancer-Father  COPD-Father  Heart disease-Mother    Social History:  Marital Status:   Presents to the ED with wife  Tobacco Use: Never Used  Alcohol Use: Yes  PCP: Pedro Brasher        Review of Systems   Constitutional: Positive for fatigue.   Cardiovascular: Negative for chest pain. "   Gastrointestinal: Positive for diarrhea. Negative for abdominal pain, blood in stool and nausea.   Neurological:        Positive for tingling.    All other systems reviewed and are negative.    Physical Exam   First Vitals:  BP: (!) 193/99  Pulse: 73  Heart Rate: 73  Temp: 98.1  F (36.7  C)  Resp: 18  SpO2: 95 %      Physical Exam  Constitutional:  Appears well-developed and well-nourished.  Heavyset.  Alert. Conversant, but has difficulty describing his symptoms.  He is a moderately vague historian, for instance cannot recall what dysrhythmia he had 2 years ago that required a cardiac ablation (records show he had a PVC ablation) but more importantly I think his symptoms are just difficult to describe. Non toxic.  HENT:   Head: Atraumatic.   Nose: Nose normal.  Mouth/Throat: Oral mucosa is clear and moist. no trismus. Pharynx normal. Tonsils symmetric. No tonsillar enlargement, erythema, or exudate.  Eyes: Conjunctivae normal. EOM normal. Pupils equal, round, and reactive to light. No scleral icterus.   Neck: Normal range of motion. Neck supple. No tracheal deviation present.   No visible JVD  Cardiovascular: Normal rate, regular rhythm. No gallop. No friction rub. No murmur heard. Symmetric radial and PT artery pulses   Pulmonary/Chest: Effort normal. No stridor. No respiratory distress. No wheezes. No rales. No rhonchi . No tenderness.   Abdominal: Soft. Bowel sounds normal. No distension. No mass. No tenderness. No rebound. No guarding.   Musculoskeletal:   RUE: Normal range of motion. No tenderness. No deformity  LUE: Normal range of motion. No tenderness. No deformity  RLE: Normal range of motion. No edema. No tenderness. No deformity  LLE: Normal range of motion. No edema. No tenderness. No deformity  Lymph: No cervical adenopathy.   Neurological: Alert and oriented to person, place, and time. Normal strength. CN II-VII intact. No sensory deficit. GCS eye subscore is 4. GCS verbal subscore is 5. GCS motor  subscore is 6. Normal coordination   Skin: Skin is warm and dry. No rash noted. No pallor. Normal capillary refill.  Psychiatric:  Normal mood. Normal affect.   Very polite.    Emergency Department Course   ECG:  @ 0734  Indication: Fatigue  Vent. Rate 66 bpm. MA interval 168 ms. QRS duration 102 ms. QT/QTc 434/454 ms. P-R-T axis 14 79 -6.   Normal sinus rhythm. Nonspecific ST abnormality. Abnormal QRS-T angle, consider primary T wave abnormality.  Abnormal ECG.  No significant change when compared to previous ECG from 2/23/18   Read @ 0739 by Dr. Husain .     Imaging:  Chest x-ray, 2 views:   Hiatal hernia and scarring in left lung base is present on  the current exam. There is also a questionable small infiltrate in the  right lung base but this is not confirmed on the lateral and could  just be due to overlapping soft tissue. Upper lung zones are clear. No  pleural effusions. Pulmonary vasculature is normal. Heart size is  normal.  Report per radiology.   Radiographic findings were communicated with the patient who voiced understanding of the findings.    Laboratory:  UA: Clear yellow urine, Mucous present, otherwise WNL     TSH: 1.50  Lactic Acid: 1.6  BNP: 40  CBC:  WBC 5.8, HGB 15.8, , otherwise WNL   CMP: Glucose 125 (H), otherwise WNL (Creatinine 1.05)   (2653) Troponin I: <0.015    Interventions:  (0810) Aspirin, 162 mg, PO     Emergency Department Course:  Nursing notes and vitals reviewed.  (6862) I performed an exam of the patient as documented above.    EKG was done, interpretation as above.   A peripheral IV was established. Blood was drawn from the patient. This was sent for laboratory testing, findings above.    The patient was sent for a chest x-ray while in the emergency department, findings above.    (3259) I rechecked on the patient and updated him on results.   Findings and plan explained to the patient who consents to observation.   (6069) I discussed the patient with Irma Rey  ABIEL of the hospitalist service, who will place the patient in observation in the observation  area.      Impression & Plan    Medical Decision Making:  Edis Beltre is a 67 year old male who presents for evaluation of episodic generalized weakness, with a warm feeling spreading from his head downward.  No associated chest pain or any definite racing heart or palpitations.  Symptoms are similar to episodes a few years ago when he was diagnosed with nonsustained V. tach and underwent a PVC ablation.  Concern here is for recurrent dysrhythmia, as well as possible angina without chest pain.  Labs show no evidence for anemia, renal failure, Amando disturbance or other specific cause for his symptoms.  I do note that he was markedly hypertensive on presentation but blood pressure came down to near normal without intervention.  Suspect that his BP was elevated due to the stress of getting checked in the ER. Initial ECG shows normal sinus rhythm and no dysrhythmogenic abnormality such as WPW, prolonged QT, Brugada syndrome, and no ischemia. Cardiac monitor while the patient here in the ER showed no dysrhythmia or ectopy. A broad differential diagnosis was considered including SVT, Atrial fibrillation, ventricular arrhythmia. The workup and exam here in ED shows not specific cause of the patient's symptoms.  Given similar to prior dysrhythmia, the patient is very worried and does not feel comfortable going home.  I cannot send him home with a Holter monitor over the weekend.  Therefore will admit him to the hobs unit today for cardiac monitoring to see if we can catch any arrhythmias as well as for serial troponins to definitively rule out NSTEMI .  Discussed with hospitalist service and they agree.    Diagnosis:    ICD-10-CM    1. Generalized muscle weakness M62.81     possible palpitations       Disposition:  Admitted to the hospitalist       Sachi BALL, am serving as a scribe on 4/21/2018 at 7:41 AM to  personally document services performed by Dr. Husain based on my observations and the provider's statements to me.    4/21/2018   [facility] EMERGENCY DEPARTMENT       Angel Husain MD  04/21/18 [id number]

## 2018-04-22 VITALS
OXYGEN SATURATION: 94 % | WEIGHT: 291.5 LBS | HEART RATE: 58 BPM | HEIGHT: 71 IN | RESPIRATION RATE: 16 BRPM | SYSTOLIC BLOOD PRESSURE: 143 MMHG | TEMPERATURE: 97.6 F | DIASTOLIC BLOOD PRESSURE: 71 MMHG | BODY MASS INDEX: 40.81 KG/M2

## 2018-04-22 PROCEDURE — A9270 NON-COVERED ITEM OR SERVICE: HCPCS | Mod: GY | Performed by: PHYSICIAN ASSISTANT

## 2018-04-22 PROCEDURE — 93270 REMOTE 30 DAY ECG REV/REPORT: CPT | Performed by: PHYSICIAN ASSISTANT

## 2018-04-22 PROCEDURE — 99217 ZZC OBSERVATION CARE DISCHARGE: CPT | Performed by: PHYSICIAN ASSISTANT

## 2018-04-22 PROCEDURE — 25000132 ZZH RX MED GY IP 250 OP 250 PS 637: Mod: GY | Performed by: PHYSICIAN ASSISTANT

## 2018-04-22 PROCEDURE — G0378 HOSPITAL OBSERVATION PER HR: HCPCS

## 2018-04-22 RX ADMIN — ASPIRIN 81 MG: 81 TABLET, COATED ORAL at 08:10

## 2018-04-22 NOTE — PLAN OF CARE
"Problem: Patient Care Overview  Goal: Plan of Care/Patient Progress Review  Outcome: No Change  PRIMARY DIAGNOSIS: CHEST PAIN  OUTPATIENT/OBSERVATION GOALS TO BE MET BEFORE DISCHARGE:  1. Ruled out acute coronary syndrome (negative or stable Troponin):  Yes  2. Pain Status: Pain free.  3. Appropriate provocative testing performed: N/A  - Stress Test Procedure: None, previous Stress test, recommending outpatient lexiscan  - Interpretation of cardiac rhythm per telemetry tech: SR with BBB overnight.     4. Cleared by Consultants (if applicable):N/A  5. Return to near baseline physical activity: Yes    VSS, Pt denies pain or any further Fatigue \"episodes.\" Pt reports his \"gas\" is better. Plan for probable discharge with event monitor.   Discharge Planner Nurse   Safe discharge environment identified: Yes  Barriers to discharge: No       Entered by: Jessie Soler 04/22/2018 8:22 AM     Please review provider order for any additional goals.   Nurse to notify provider when observation goals have been met and patient is ready for discharge.      "

## 2018-04-22 NOTE — PLAN OF CARE
Problem: Patient Care Overview  Goal: Plan of Care/Patient Progress Review  Outcome: Adequate for Discharge Date Met: 04/22/18  Patient's After Visit Summary was reviewed with patient and/or spouse.   Patient verbalized understanding of After Visit Summary, recommended follow up and was given an opportunity to ask questions.   Discharge medications sent home with patient/family: Not applicable,    Discharged with spouse to home via private transport.           OBSERVATION patient END time: 1035

## 2018-04-22 NOTE — PLAN OF CARE
Problem: Patient Care Overview  Goal: Plan of Care/Patient Progress Review  Problem: Patient Care Overview  Goal: Plan of Care/Patient Progress Review  PRIMARY DIAGNOSIS: GENERALIZED WEAKNESS     OUTPATIENT/OBSERVATION GOALS TO BE MET BEFORE DISCHARGE  1. Orthostatic performed: No, not ordered     2. Tolerating PO medications: Yes     3. Return to near baseline physical activity: Yes     4. Cleared for discharge by consultants (if involved): N/A     Discharge Planner Nurse   Safe discharge environment identified: Yes  Barriers to discharge: No     Pt is A&Ox4, VSS. Tele running sinus rhythm per tele tech. Pt is up independently in room. Regular diet, no caffeine. PIV right AC saline locked. Lung sounds clear, heart sounds normal, bowel sounds present, last BM yesterday, voiding well, trace edema bilaterally in lower extremities. Numbness and tingling in both feet - chronic per pt. Denies any pain/chest pain, SOB, dizziness, lightheadedness, palpitations. Plan is to monitor on tele overnight and discharge home tomorrow - possibly with holter monitor. Nuclear stress test outpatient - possibly. Will continue to monitor and assess.   Please review provider order for any additional goals.   Nurse to notify provider when observation goals have been met and patient is ready for discharge.

## 2018-04-22 NOTE — PLAN OF CARE
Problem: Patient Care Overview  Goal: Plan of Care/Patient Progress Review  Outcome: Improving  PRIMARY DIAGNOSIS: GENERALIZED WEAKNESS      OUTPATIENT/OBSERVATION GOALS TO BE MET BEFORE DISCHARGE  1. Orthostatic performed: No, not ordered      2. Tolerating PO medications: Yes      3. Return to near baseline physical activity: Yes      4. Cleared for discharge by consultants (if involved): N/A      Discharge Planner Nurse   Safe discharge environment identified: Yes  Barriers to discharge: No      Pt is A&Ox4, VSS. Tele running sinus rhythm per tele tech. Pt is up independently in room. Regular diet, no caffeine. PIV right AC saline locked. Lung sounds clear, heart sounds normal, bowel sounds present, last BM Friday, voiding well, trace edema bilaterally in lower extremities. Numbness and tingling in both feet - chronic per pt. Denies any pain/chest pain, SOB, dizziness, lightheadedness, palpitations. Plan is to monitor on tele overnight and discharge home in am- possibly with holter monitor. Nuclear stress test outpatient - possibly. Will continue to monitor and assess.   Please review provider order for any additional goals.   Nurse to notify provider when observation goals have been met and patient is ready for discharge.

## 2018-04-22 NOTE — DISCHARGE SUMMARY
[facility] Outpatient / Observation Unit  Discharge Summary        Edsi Beltre MRN# [id number]   YOB: 1950 Age: 67 year old     Date of Admission:  4/21/2018  Date of Discharge:  4/22/2018  Admitting Physician:  Maria De Jesus Mar DO  Discharge Physician: Irma Rey PA-C  Discharging Service: Hospitalist      Primary Provider: Pedro Brasher  Primary Care Physician Phone Number: [phone]         Primary Discharge Diagnoses:    Edis Beltre was admitted on 4/21/2018 for concerns of flushing sensation and weakness that felt similar to hx of arrhythmia.    1. Concern for arrhythmia - no arrhythmia detected here, pt states sx were the same as when he had frequent PVCs and NSVT, at which time he underwent cardiac ablation and has done relatively well. Discharge home on 30 day cardiac event monitor. Pt was previously seen in hospital in Feb for jaw pain, concerning for anginal equivalent, underwent exercise stress test but he did not achieve target work load. Will order NM Mary as an outpatient to rule out underlying CAD as a potential contributor to his sx.           Secondary Discharge Diagnoses:     Past Medical History:   Diagnosis Date     Arthritis      Calcaneal spur      Esophageal reflux      FAMILY HX GI MALIGNANCY 10/1/2007     Gastro-oesophageal reflux disease      NSVT (nonsustained ventricular tachycardia) (H)     on holter 2/2016     Other chronic pain     Joint pain for 30 years.     Personal history of colonic polyps     adenoma - colonoscopy due 2012     Pneumothorax     recurrent, surgically treated     Premature ventricular contraction     ablation 2/26/2016     Pure hypercholesterolemia      Sleep apnea     CPAP                Code Status:      Full Code        Brief Hospital Summary:        Reason for your hospital stay       Flushing sensation and weakness concerning for arrhythmia. Serial   troponins were negative and telemetry was unremarkable here.  Discharged   home with cardiac event monitor and plan for outpatient stress test.                    Please refer to initial admission history and physical for further details.   Briefly, Edis Beltre was admitted on 4/21/2018 for concerns of flushing sensation and weakness concerning for arrhythmia.   Initial work up in the ED did not reveal evidence of STEMI or findings consistent with unstable angina or acute coronary ischemia. Pt was registered to the Observation Unit for further evaluation.   Pt ruled out with serial troponins, underwent cardiac monitoring on tele and no arrhythmias were detected here. Labs were reviewed and significant results addressed. Pt was discharged home with a cardiac event  monitor. Medications were reviewed. Pt is instructed to follow up as below.           Significant Lab During Hospitalization:        Recent Labs  Lab 04/21/18  0733   WBC 5.8   HGB 15.8   HCT 46.9   MCV 95          Recent Labs  Lab 04/21/18  0733      POTASSIUM 3.8   CHLORIDE 107   CO2 25   ANIONGAP 9   *   BUN 17   CR 1.05   GFRESTIMATED 70   GFRESTBLACK 85   EDWIN 8.7   PROTTOTAL 7.5   ALBUMIN 3.7   BILITOTAL 0.9   ALKPHOS 65   AST 28   ALT 19       Recent Labs  Lab 04/21/18  0811   NTBNPI 40       Recent Labs  Lab 04/21/18  0811   LACT 1.6       Recent Labs  Lab 04/21/18  0811   TSH 1.50       Recent Labs  Lab 04/21/18  1247 04/21/18  0733   TROPI <0.015 <0.015       Recent Labs  Lab 04/21/18  0818   COLOR Yellow   APPEARANCE Clear   URINEGLC Negative   URINEBILI Negative   URINEKETONE Negative   SG 1.017   UBLD Negative   URINEPH 6.0   PROTEIN Negative   NITRITE Negative   LEUKEST Negative   RBCU <1   WBCU 1                Significant Imaging During Hospitalization:      Recent Results (from the past 48 hour(s))   XR Chest 2 Views    Narrative    CHEST TWO VIEWS  4/21/2018 8:45 AM     HISTORY: Weakness, hypertension.     COMPARISON: 11/14/2014      Impression    IMPRESSION: Hiatal hernia and  "scarring in left lung base is present on  the current exam. There is also a questionable small infiltrate in the  right lung base but this is not confirmed on the lateral and could  just be due to overlapping soft tissue. Upper lung zones are clear. No  pleural effusions. Pulmonary vasculature is normal. Heart size is  normal.    MILAGROS DIEHL MD              Pending Results:        Unresulted Labs Ordered in the Past 30 Days of this Admission     No orders found for last 61 day(s).              Consultations This Hospital Stay:      No consultations were requested during this admission         Discharge Instructions and Follow-Up:      Follow-up Appointments     Follow-up and recommended labs and tests        Follow up with primary care provider, Pedro Brasher, within 7-14 days   for hospital follow- up.  No follow up labs or test are needed.  30 day event monitor  Outpatient NM Lexiscan                        Discharge Disposition:      Discharged to home         Discharge Medications:        Current Discharge Medication List      CONTINUE these medications which have NOT CHANGED    Details   aspirin 81 MG tablet Take 81 mg by mouth daily   Qty: 30 tablet      DAILY MULTIVITAMIN OR TABS Take 1 tablet by mouth daily      fish oil-omega-3 fatty acids 1000 MG capsule Take 2 g by mouth daily      pantoprazole (PROTONIX) 40 MG EC tablet Take 40 mg by mouth daily as needed       Probiotic Product (ACIDOPHILUS PROBIOTIC BLEND) CAPS Take 1 capsule by mouth daily    Associated Diagnoses: Diarrhea, unspecified type                 Allergies:         Allergies   Allergen Reactions     Atorvastatin      Other reaction(s): Muscle Aches/Weakness           Condition and Physical on Discharge:      Discharge condition: Stable   Vitals: Blood pressure 143/71, pulse 58, temperature 97.6  F (36.4  C), temperature source Oral, resp. rate 16, height 1.803 m (5' 11\"), weight 132.2 kg (291 lb 8 oz), SpO2 94 %.  291 lbs 8 oz  "     GENERAL:  Comfortable.  PSYCH: pleasant, oriented, No acute distress.  HEART:  RRR.  LUNGS:  Normal Respiratory effort.    EXTREMITIES:  Able to ambulate independently.  SKIN:  Dry to touch, No rash, wound or ulcerations.  NEUROLOGIC:  Grossly intact    Irma Rey PA-C

## 2018-04-22 NOTE — PLAN OF CARE
Problem: Patient Care Overview  Goal: Plan of Care/Patient Progress Review  Outcome: Improving  PRIMARY DIAGNOSIS: GENERALIZED WEAKNESS      OUTPATIENT/OBSERVATION GOALS TO BE MET BEFORE DISCHARGE  1. Orthostatic performed: No, not ordered      2. Tolerating PO medications: Yes      3. Return to near baseline physical activity: Yes      4. Cleared for discharge by consultants (if involved): N/A      Discharge Planner Nurse   Safe discharge environment identified: Yes  Barriers to discharge: No      Pt is A&Ox4, VSS. Tele running sinus rhythm per tele tech. Pt is up independently in room. Regular diet, no caffeine. PIV right AC saline locked. Lung sounds clear, heart sounds normal, bowel sounds present, last BM yesterday, voiding well, trace edema bilaterally in lower extremities. Numbness and tingling in both feet - chronic per pt. Denies any pain/chest pain, SOB, dizziness, lightheadedness, palpitations. Plan is to monitor on tele overnight and discharge home in am- possibly with holter monitor. Nuclear stress test outpatient - possibly. Will continue to monitor and assess.   Please review provider order for any additional goals.   Nurse to notify provider when observation goals have been met and patient is ready for discharge.

## 2018-04-23 ENCOUNTER — TELEPHONE (OUTPATIENT)
Dept: FAMILY MEDICINE | Facility: CLINIC | Age: 68
End: 2018-04-23

## 2018-04-23 DIAGNOSIS — R68.84 JAW PAIN: ICD-10-CM

## 2018-04-23 DIAGNOSIS — R07.89 CHEST PRESSURE: Primary | ICD-10-CM

## 2018-04-23 DIAGNOSIS — R00.2 PALPITATIONS: ICD-10-CM

## 2018-04-23 NOTE — TELEPHONE ENCOUNTER
"ED / Discharge Outreach Protocol    Patient Contact    Attempt # 1    Was call answered?  Yes.  \"May I please speak with Edis\"  Is patient available?   Yes    ED/Discharge Protocol    \"Hi, my name is Haily Sanders, a registered nurse, and I am calling on behalf of Dr. Brasher's office at [place].  I am calling to follow up and see how things are going for you after your recent visit.\"    \"I see that you were in the (ER/UC/IP) on 4/22/2018.    How are you doing now that you are home?\" pt is doing well    Is patient experiencing symptoms that may require a hospital visit?  no    Discharge Instructions    \"Let's review your discharge instructions.  What is/are the follow-up recommendations?  Pt. Response: keep heart monitor on for 30 days    \"Were you instructed to make a follow-up appointment?\"  Pt. Response: Yes.  Has appointment been made?   Yes      \"When you see the provider, I would recommend that you bring your discharge instructions with you.    Medications    \"How many new medications are you on since your hospitalization/ED visit?\"    0-1  \"How many of your current medicines changed (dose, timing, name, etc.) while you were in the hospital/ED visit?\"   0-1  \"Do you have questions about your medications?\"   No  \"Were you newly diagnosed with heart failure, COPD, diabetes or did you have a heart attack?\"   No  For patients on insulin: \"Did you start on insulin in the hospital or did you have your insulin dose changed?\"   No    Medication reconciliation completed? Yes    Was MTM referral placed (*Make sure to put transitions as reason for referral)?   No    Call Summary    \"Do you have any questions or concerns about your condition or care plan at the moment?\"    No  Triage nurse advice given: continue to wear monitor, call back with any further questions, follow up as scheduled. The patient indicates understanding of these issues and agrees with the plan.      Patient was in ER 0x in the past year (assess " "appropriateness of ER visits.)      \"If you have questions or things don't continue to improve, we encourage you contact us through the main clinic number,  [phone].  Even if the clinic is not open, triage nurses are available 24/7 to help you.     We would like you to know that our clinic has extended hours (provide information).  We also have urgent care (provide details on closest location and hours/contact info)\"      \"Thank you for your time and take care!\"    Vanessa Sanders RN  [place] [facility]        "

## 2018-04-23 NOTE — TELEPHONE ENCOUNTER
Patient discharged from [facility] for inpatient hospital stay on 4/22 for generalized muscle weakness, jaw pain.    Please contact patient to follow up; no appointment scheduled at this time.    ER / IP:  0/0    Care Coordination:  hunter Aparicio

## 2018-04-24 NOTE — TELEPHONE ENCOUNTER
Attempt # 1     Left non-detailed VM for patient to call back.    Belkys Gautam, RHONDA, RN, PHN  [facility]

## 2018-04-25 NOTE — TELEPHONE ENCOUNTER
Called #   Telephone Information:   Mobile [phone]   Advised pt on the information below     Patient stated an understanding and agreed with plan.    RN unable to sign it needs to have MD SÁNCHEZ review it again  - please advise     Pt did state that every now and then he will have a tightness in the chest - does not last very long  t did stated if it last longer than a few second he would go to the ER     Thank you       Sachi Saenz RN, BSN  [facility]

## 2018-04-26 NOTE — TELEPHONE ENCOUNTER
Put note on RL desk area to review and advise. Please have triage call pt when ready.    Vanessa Sanders RN  [place] [facility]

## 2018-04-26 NOTE — TELEPHONE ENCOUNTER
Patient notified by phone.  He verbalized understanding and agreed with plan.    Belkys Gautam, BS, RN, PHN  [facility]) [phone]

## 2018-05-02 ENCOUNTER — HOSPITAL ENCOUNTER (OUTPATIENT)
Dept: CARDIOLOGY | Facility: CLINIC | Age: 68
Discharge: HOME | End: 2018-05-02
Attending: FAMILY MEDICINE | Admitting: FAMILY MEDICINE
Payer: MEDICARE

## 2018-05-02 VITALS — DIASTOLIC BLOOD PRESSURE: 72 MMHG | HEART RATE: 66 BPM | SYSTOLIC BLOOD PRESSURE: 138 MMHG

## 2018-05-02 DIAGNOSIS — R00.2 PALPITATIONS: ICD-10-CM

## 2018-05-02 DIAGNOSIS — R07.89 CHEST PRESSURE: ICD-10-CM

## 2018-05-02 DIAGNOSIS — R68.84 JAW PAIN: ICD-10-CM

## 2018-05-02 PROCEDURE — 25000128 H RX IP 250 OP 636: Performed by: FAMILY MEDICINE

## 2018-05-02 PROCEDURE — A9502 TC99M TETROFOSMIN: HCPCS | Performed by: FAMILY MEDICINE

## 2018-05-02 PROCEDURE — 78452 HT MUSCLE IMAGE SPECT MULT: CPT | Mod: 26 | Performed by: INTERNAL MEDICINE

## 2018-05-02 PROCEDURE — 34300033 ZZH RX 343: Performed by: FAMILY MEDICINE

## 2018-05-02 PROCEDURE — 93018 CV STRESS TEST I&R ONLY: CPT | Performed by: INTERNAL MEDICINE

## 2018-05-02 PROCEDURE — 93016 CV STRESS TEST SUPVJ ONLY: CPT | Performed by: INTERNAL MEDICINE

## 2018-05-02 PROCEDURE — 78452 HT MUSCLE IMAGE SPECT MULT: CPT

## 2018-05-02 RX ORDER — REGADENOSON 0.08 MG/ML
0.4 INJECTION, SOLUTION INTRAVENOUS ONCE
Status: COMPLETED | OUTPATIENT
Start: 2018-05-02 | End: 2018-05-02

## 2018-05-02 RX ORDER — CAFFEINE CITRATE 20 MG/ML
60 SOLUTION INTRAVENOUS
Status: DISCONTINUED | OUTPATIENT
Start: 2018-05-02 | End: 2018-05-03 | Stop reason: HOSPADM

## 2018-05-02 RX ORDER — ACYCLOVIR 200 MG/1
0-1 CAPSULE ORAL
Status: DISCONTINUED | OUTPATIENT
Start: 2018-05-02 | End: 2018-05-03 | Stop reason: HOSPADM

## 2018-05-02 RX ORDER — CAFFEINE 200 MG
200 TABLET ORAL
Status: DISCONTINUED | OUTPATIENT
Start: 2018-05-02 | End: 2018-05-03 | Stop reason: HOSPADM

## 2018-05-02 RX ORDER — ALBUTEROL SULFATE 90 UG/1
2 AEROSOL, METERED RESPIRATORY (INHALATION) EVERY 5 MIN PRN
Status: DISCONTINUED | OUTPATIENT
Start: 2018-05-02 | End: 2018-05-03 | Stop reason: HOSPADM

## 2018-05-02 RX ORDER — AMINOPHYLLINE 25 MG/ML
50-100 INJECTION, SOLUTION INTRAVENOUS
Status: DISCONTINUED | OUTPATIENT
Start: 2018-05-02 | End: 2018-05-03 | Stop reason: HOSPADM

## 2018-05-02 RX ADMIN — TETROFOSMIN 30.2 MCI.: 1.38 INJECTION, POWDER, LYOPHILIZED, FOR SOLUTION INTRAVENOUS at 13:12

## 2018-05-02 RX ADMIN — TETROFOSMIN 10.31 MCI.: 1.38 INJECTION, POWDER, LYOPHILIZED, FOR SOLUTION INTRAVENOUS at 11:22

## 2018-05-02 RX ADMIN — REGADENOSON 0.4 MG: 0.08 INJECTION, SOLUTION INTRAVENOUS at 13:08

## 2018-05-21 ENCOUNTER — APPOINTMENT (OUTPATIENT)
Age: 68
Setting detail: DERMATOLOGY
End: 2018-06-29

## 2018-05-21 DIAGNOSIS — Z85.828 PERSONAL HISTORY OF OTHER MALIGNANT NEOPLASM OF SKIN: ICD-10-CM

## 2018-05-21 DIAGNOSIS — D18.0 HEMANGIOMA: ICD-10-CM

## 2018-05-21 DIAGNOSIS — L81.4 OTHER MELANIN HYPERPIGMENTATION: ICD-10-CM

## 2018-05-21 DIAGNOSIS — D22 MELANOCYTIC NEVI: ICD-10-CM

## 2018-05-21 DIAGNOSIS — L82.1 OTHER SEBORRHEIC KERATOSIS: ICD-10-CM

## 2018-05-21 DIAGNOSIS — B07.8 OTHER VIRAL WARTS: ICD-10-CM

## 2018-05-21 DIAGNOSIS — L82.0 INFLAMED SEBORRHEIC KERATOSIS: ICD-10-CM

## 2018-05-21 DIAGNOSIS — L57.0 ACTINIC KERATOSIS: ICD-10-CM

## 2018-05-21 DIAGNOSIS — Z71.89 OTHER SPECIFIED COUNSELING: ICD-10-CM

## 2018-05-21 PROBLEM — D18.01 HEMANGIOMA OF SKIN AND SUBCUTANEOUS TISSUE: Status: ACTIVE | Noted: 2018-05-21

## 2018-05-21 PROBLEM — D22.5 MELANOCYTIC NEVI OF TRUNK: Status: ACTIVE | Noted: 2018-05-21

## 2018-05-21 PROBLEM — D23.71 OTHER BENIGN NEOPLASM OF SKIN OF RIGHT LOWER LIMB, INCLUDING HIP: Status: ACTIVE | Noted: 2018-05-21

## 2018-05-21 PROCEDURE — OTHER LIQUID NITROGEN: OTHER

## 2018-05-21 PROCEDURE — 17003 DESTRUCT PREMALG LES 2-14: CPT

## 2018-05-21 PROCEDURE — 17000 DESTRUCT PREMALG LESION: CPT | Mod: 59

## 2018-05-21 PROCEDURE — 99214 OFFICE O/P EST MOD 30 MIN: CPT | Mod: 25

## 2018-05-21 PROCEDURE — OTHER SUNSCREEN RECOMMENDATIONS: OTHER

## 2018-05-21 PROCEDURE — OTHER MIPS QUALITY: OTHER

## 2018-05-21 PROCEDURE — 17111 DESTRUCT LESION 15 OR MORE: CPT

## 2018-05-21 PROCEDURE — OTHER COUNSELING: OTHER

## 2018-05-21 ASSESSMENT — LOCATION ZONE DERM
LOCATION ZONE: NECK
LOCATION ZONE: ARM
LOCATION ZONE: TOENAIL
LOCATION ZONE: FACE
LOCATION ZONE: NOSE
LOCATION ZONE: SCALP
LOCATION ZONE: TRUNK

## 2018-05-21 ASSESSMENT — LOCATION DETAILED DESCRIPTION DERM
LOCATION DETAILED: NASAL ROOT
LOCATION DETAILED: LEFT INFERIOR PREAURICULAR CHEEK
LOCATION DETAILED: RIGHT INFERIOR CENTRAL MALAR CHEEK
LOCATION DETAILED: RIGHT MEDIAL TEMPLE
LOCATION DETAILED: LEFT LATERAL MALAR CHEEK
LOCATION DETAILED: RIGHT SUPERIOR MEDIAL UPPER BACK
LOCATION DETAILED: RIGHT CENTRAL TEMPLE
LOCATION DETAILED: RIGHT SUPERIOR MEDIAL FOREHEAD
LOCATION DETAILED: RIGHT INFERIOR MEDIAL UPPER BACK
LOCATION DETAILED: RIGHT LATERAL TEMPLE
LOCATION DETAILED: RIGHT 3RD TOENAIL
LOCATION DETAILED: LEFT CENTRAL EYEBROW
LOCATION DETAILED: NASAL SUPRATIP
LOCATION DETAILED: INFERIOR MID FOREHEAD
LOCATION DETAILED: RIGHT MEDIAL UPPER BACK
LOCATION DETAILED: RIGHT NASAL DORSUM
LOCATION DETAILED: RIGHT INFERIOR POSTAURICULAR SKIN
LOCATION DETAILED: RIGHT ANTERIOR SHOULDER
LOCATION DETAILED: LEFT CLAVICULAR NECK
LOCATION DETAILED: RIGHT INFERIOR FRONTAL SCALP
LOCATION DETAILED: RIGHT INFERIOR LATERAL MALAR CHEEK
LOCATION DETAILED: RIGHT CENTRAL FRONTAL SCALP
LOCATION DETAILED: LEFT CENTRAL TEMPLE
LOCATION DETAILED: LEFT NASAL DORSUM
LOCATION DETAILED: RIGHT INFERIOR LATERAL FOREHEAD
LOCATION DETAILED: LEFT NASAL SIDEWALL
LOCATION DETAILED: LEFT SUPERIOR LATERAL BUCCAL CHEEK
LOCATION DETAILED: LEFT SUPERIOR LATERAL MALAR CHEEK
LOCATION DETAILED: RIGHT SUPERIOR FOREHEAD
LOCATION DETAILED: LEFT LATERAL EYEBROW
LOCATION DETAILED: RIGHT LATERAL MALAR CHEEK

## 2018-05-21 ASSESSMENT — LOCATION SIMPLE DESCRIPTION DERM
LOCATION SIMPLE: LEFT NOSE
LOCATION SIMPLE: RIGHT TEMPLE
LOCATION SIMPLE: LEFT CHEEK
LOCATION SIMPLE: INFERIOR FOREHEAD
LOCATION SIMPLE: RIGHT UPPER BACK
LOCATION SIMPLE: SCALP
LOCATION SIMPLE: NOSE
LOCATION SIMPLE: LEFT TEMPLE
LOCATION SIMPLE: RIGHT 3RD TOE
LOCATION SIMPLE: LEFT EYEBROW
LOCATION SIMPLE: LEFT ANTERIOR NECK
LOCATION SIMPLE: RIGHT CHEEK
LOCATION SIMPLE: RIGHT FOREHEAD
LOCATION SIMPLE: RIGHT SHOULDER

## 2018-05-21 NOTE — PROCEDURE: LIQUID NITROGEN
Consent: The patient's consent was obtained including but not limited to risks of crusting, scabbing, blistering, scarring, darker or lighter pigmentary change, recurrence, incomplete removal and infection.
Render Post-Care Instructions In Note?: yes
Post-Care Instructions: I reviewed with the patient in detail post-care instructions. Patient is to wear sunprotection, and avoid picking at any of the treated lesions. Pt may apply Vaseline to crusted or scabbing areas.
Medical Necessity Information: It is in your best interest to select a reason for this procedure from the list below. All of these items fulfill various CMS LCD requirements except the new and changing color options.
Add 52 Modifier (Optional): no
Medical Necessity Clause: This procedure was medically necessary because the lesions that were treated were:
Number Of Freeze-Thaw Cycles: 1 freeze-thaw cycle
Detail Level: Detailed
Duration Of Freeze Thaw-Cycle (Seconds): 10
Duration Of Freeze Thaw-Cycle (Seconds): 15-20

## 2018-05-21 NOTE — PROCEDURE: SUNSCREEN RECOMMENDATIONS
Products Recommended: The following products were discussed:\\nAnthelios - La Roche Posay\\nCoppertone Sport\\nNeutrogena sunscreens (many to choose from)\\nIntellishade - Revision (tinted)\\nDaily SPF 33 Protectant - Tylor Tirado (non-tinted)
General Sunscreen Counseling: I recommended a broad spectrum (UVA/B blocking) sunscreen with a SPF of 30 or higher.  I explained that SPF 30 sunscreens block approximately 97 percent of the sun's harmful ultraviolet rays.  Sunscreens should be applied at least 15 minutes prior to expected sun exposure and then every 2 hours after that as long as sun exposure continues. If swimming or exercising, sunscreen should be reapplied every 45 minutes to an hour after getting wet or sweating.  One ounce, or the equivalent of a shot glass full of sunscreen, is adequate to protect the skin not covered by a bathing suit. I also recommended a lip balm with a SPF 30 sunscreen as well. Sun protective clothing can be used in lieu of sunscreen, but must be worn the entire time you are exposed to the sun's rays.  Such clothing is woven of fibers with a chemical structure that absorbs or reflects ultraviolet radiation.  The best hats for sun protection have a full 360 degree brim.  Baseball style caps do not protect the ears or the back and sides of the neck and are inadequate for effective sun protection.
Detail Level: Detailed

## 2018-05-25 NOTE — PROGRESS NOTES
Dear Edis,    Here is a summary of your recent test results:  Normal sinus rhythm throughout the study - good news!    Healthy regards,  Chalino Brasher MD

## 2018-05-29 ENCOUNTER — TELEPHONE (OUTPATIENT)
Dept: FAMILY MEDICINE | Facility: CLINIC | Age: 68
End: 2018-05-29

## 2018-05-29 NOTE — TELEPHONE ENCOUNTER
Reason for Call:  Other call back    Detailed comments: Pt was calling in about results from the cardio monitor he wore for a month, he said they put it on when he was in the hospital.      Phone Number Patient can be reached at: Home number on file [phone] (home)    Best Time:     Can we leave a detailed message on this number? YES    Call taken on 5/29/2018 at 3:07 PM by Vanessa Michelle

## 2018-05-29 NOTE — TELEPHONE ENCOUNTER
Routing to RL to review and advise on cardiac monitor results.    Belkys Gautam, BS, RN, N  [facility]) [phone]

## 2018-06-07 NOTE — TELEPHONE ENCOUNTER
Left detailed message for Edis that there were no abnormal rhythms. Call back PRN    Katherin Casey RN- Triage [employer]

## 2018-07-02 NOTE — PROGRESS NOTES
[address] [address]  [phone]  Dept: [phone]    PRE-OP EVALUATION:  Today's date: 7/3/2018    Edis Beltre (: 1950) presents for pre-operative evaluation assessment as requested by Dr. Koenig.  He requires evaluation and anesthesia risk assessment prior to undergoing surgery/procedure for treatment of left heel pain.     Fax number for surgical facility: [phone]  Primary Physician: Pedro Brasher  Type of Anesthesia Anticipated: to be determined    Patient has a Health Care Directive or Living Will:  YES     Preop Questions 2018   Who is doing your surgery? Dr. Rollins   What are you having done? Remove tissue by back of heel   Date of Surgery/Procedure: 2018   Facility or Hospital where procedure/surgery will be performed: [facility]   1.  Do you have a history of Heart attack, stroke, stent, coronary bypass surgery, or other heart surgery? No   2.  Do you ever have any pain or discomfort in your chest? No   3.  Do you have a history of  Heart Failure? No   4.   Are you troubled by shortness of breath when:  walking on a level surface, or up a slight hill, or at night? No   5.  Do you currently have a cold, bronchitis or other respiratory infection? No   6.  Do you have a cough, shortness of breath, or wheezing? No   7.  Do you sometimes get pains in the calves of your legs when you walk? No   8. Do you or anyone in your family have previous history of blood clots? No   9.  Do you or does anyone in your family have a serious bleeding problem such as prolonged bleeding following surgeries or cuts? No   10. Have you ever had problems with anemia or been told to take iron pills? No   11. Have you had any abnormal blood loss such as black, tarry or bloody stools? No   12. Have you ever had a blood transfusion? No   13. Have you or any of your relatives ever had problems with anesthesia? YES - patient,  after surgery had over inflated lung or collapsed during surgery    14. Do you have sleep apnea, excessive snoring or daytime drowsiness? YES - sleep apnea-pt has cpap   15. Do you have any prosthetic heart valves? No   16. Do you have prosthetic joints? YES - both knees          HPI:     HPI related to upcoming procedure: Pt will be undergoing surgery for treatment of left heel pain.      HYPERLIPIDEMIA - Patient has a long history of significant Hyperlipidemia requiring medication for treatment with recent good control. Patient reports no problems or side effects with the medication.                                                                                                                                                       .  LAURA - Patient has a longstanding history of sleep apnea. Patient manages symptoms with a CPAP machine.                                                                                                                                           MEDICAL HISTORY:     Patient Active Problem List    Diagnosis Date Noted     Hyperlipidemia LDL goal <100 10/31/2010     Priority: High     The 10-year ASCVD risk score (Waldo MAURY Jr., et al., 2013) is: 20.1%    Values used to calculate the score:      Age: 66 years      Sex: Male      Is an : No      Diabetic: No      Tobacco smoker: No      Systolic Blood Pressure: 138 mmHg      Prescribed Antihypertensives: No      HDL Cholesterol: 35 mg/dL      Total Cholesterol: 183 mg/dL         Paroxysmal ventricular tachycardia (H) 07/03/2018     Priority: Medium     Palpitations 04/21/2018     Priority: Medium     Jaw pain 02/23/2018     Priority: Medium     Metatarsalgia of right foot 07/24/2017     Priority: Medium     Postprocedural pneumothorax - 2014 11/01/2016     Priority: Medium     Premature ventricular contraction      Priority: Medium     S/P total knee arthroplasty 04/14/2014     Priority: Medium     Advanced directives,  counseling/discussion 11/08/2012     Priority: Medium     Advance Care Planning:   Receipt of ACP document:  Received: Health Care Directive which was witnessed or notarized on 3/20/12.  Document not previously scanned.  Validation form completed and sent with document to be scanned.  Code Status needs to be updated to reflect choices in most recent ACP document. Orders placed.  Confirmed/documented designated decision maker(s). See permanent comments section of demographics in clinical tab. View document(s) and details by clicking on code status.   Ольга Neal CMA 11/01/2016                  Prediabetes 02/24/2012     Priority: Medium     Morbid obesity due to excess calories (H) 03/31/2011     Priority: Medium     FAMILY HX GI MALIGNANCY 10/01/2007     Priority: Medium     LAURA (obstructive sleep apnea) 01/02/2006     Priority: Medium     Gastroesophageal reflux disease without esophagitis 11/28/2005     Priority: Medium      Past Medical History:   Diagnosis Date     Arthritis      Calcaneal spur      Esophageal reflux      FAMILY HX GI MALIGNANCY 10/1/2007     Gastro-oesophageal reflux disease      NSVT (nonsustained ventricular tachycardia) (H)     on holter 2/2016     Other chronic pain     Joint pain for 30 years.     Personal history of colonic polyps     adenoma - colonoscopy due 2012     Pneumothorax     recurrent, surgically treated     Premature ventricular contraction     ablation 2/26/2016     Pure hypercholesterolemia      Sleep apnea     CPAP     Past Surgical History:   Procedure Laterality Date     ANKLE SURGERY      left     ARTHROPLASTY KNEE  4/14/2014    Procedure: Left Total Knee Arthroplasty ;  Surgeon: Gallito Willis MD;  Location: [facility] OR     BIOPSY      Skin doctor     CHOLECYSTECTOMY, LAPOROSCOPIC  2007     COLONOSCOPY       EYE SURGERY  2013    Cataract surgery on both eyes     H ABLATION PVC  2/26/16     STRIP VEIN      left leg     SURGICAL HISTORY OF -       L VEIN STRIPPING  "    SURGICAL HISTORY OF -       L MENISCUS- ARTHROSCOPY     THORACIC SURGERY  5/2014    VATS , wedge resction and mechanical pleurodesis -[place]     Current Outpatient Prescriptions   Medication Sig Dispense Refill     aspirin 81 MG tablet Take 81 mg by mouth daily  30 tablet      DAILY MULTIVITAMIN OR TABS Take 1 tablet by mouth daily       fish oil-omega-3 fatty acids 1000 MG capsule Take 2 g by mouth daily       Probiotic Product (ACIDOPHILUS PROBIOTIC BLEND) CAPS Take 1 capsule by mouth daily       OTC products: Aspirin    Allergies   Allergen Reactions     Atorvastatin      Other reaction(s): Muscle Aches/Weakness      Latex Allergy: NO    Social History   Substance Use Topics     Smoking status: Never Smoker     Smokeless tobacco: Never Used     Alcohol use Yes      Comment: Minimal     History   Drug Use No       REVIEW OF SYSTEMS:   Constitutional, HEENT, cardiovascular, pulmonary, GI, , musculoskeletal, neuro, skin, endocrine and psych systems are negative, except as otherwise noted.    This document serves as a record of the services and decisions personally performed and made by Pedro Brasher MD. It was created on his behalf by Gabriela Gray, a trained medical scribe. The creation of this document is based the provider's statements to the medical scribe.  Scribe Gabriela Gray 8:52 AM, July 3, 2018    EXAM:   /78  Pulse 70  Temp 98.1  F (36.7  C) (Oral)  Ht 1.803 m (5' 11\")  Wt 135.6 kg (299 lb)  SpO2 94%  BMI 41.7 kg/m2    GENERAL APPEARANCE: healthy, alert and no distress     HENT: ear canals and TM's normal and nose and mouth without ulcers or lesions     NECK: no adenopathy, no asymmetry, masses, or scars and thyroid normal to palpation     RESP: lungs clear to auscultation - no rales, rhonchi or wheezes     CV: regular rates and rhythm, normal S1 S2, no S3 or S4 and no murmur, click or rub     ABDOMEN:  soft, nontender, no HSM or masses and bowel sounds normal     MS: " extremities normal- no gross deformities noted, no evidence of inflammation in joints, FROM in all extremities.     SKIN: no suspicious lesions or rashes     NEURO: Normal strength and tone, sensory exam grossly normal, mentation intact and speech normal     PSYCH: mentation appears normal. and affect normal/bright     LYMPHATICS: No cervical adenopathy    DIAGNOSTICS:   EKG:  last ekg on 02/23/2018 [Normal sinus rhythm, no acute changes]    Recent Labs   Lab Test  04/21/18   0733  02/23/18   0435   04/21/14   0721  04/20/14   0725   04/16/14   0555   02/14/13   0809   HGB  15.8  15.6   < >   --    --    < >  13.3   < >   --    PLT  275  254   < >   --    --    < >   --    < >   --    INR   --    --    --   1.33*  1.60*   < >  2.13*   < >   --    NA  141  137   < >   --    --    < >   --    < >  142   POTASSIUM  3.8  3.8   < >   --    --    < >   --    < >  5.0   CR  1.05  1.14   < >   --    --    < >   --    < >  1.08   A1C   --    --    --    --    --    --   5.7   --   5.5    < > = values in this interval not displayed.        IMPRESSION:   Reason for surgery/procedure: Pt is undergoing surgery for treatment of left heel pain.    The proposed surgical procedure is considered INTERMEDIATE risk.    REVISED CARDIAC RISK INDEX  The patient has the following serious cardiovascular risks for perioperative complications such as (MI, PE, VFib and 3  AV Block):  No serious cardiac risks  INTERPRETATION: 0 risks: Class I (very low risk - 0.4% complication rate)    The patient has the following additional risks for perioperative complications:  No identified additional risks      ICD-10-CM    1. Preop general physical exam Z01.818    2. Pain of left heel M79.672    3. Morbid obesity due to excess calories (H) E66.01    4. Need for hepatitis C screening test Z11.59    5. h/o Paroxysmal ventricular tachycardia (H) I47.2        RECOMMENDATIONS:       Obstructive Sleep Apnea (or suspected sleep apnea)  Patient is to bring their  home CPAP with them on the day of surgery        Anticoagulant or Antiplatelet Medication Use  ASPIRIN: Discontinue ASA 7-10 days prior to procedure to reduce bleeding risk.  It should be resumed post-operatively.        APPROVAL GIVEN to proceed with proposed procedure, without further diagnostic evaluation           The information in this document, created by the medical scribe for me, accurately reflects the services I personally performed and the decisions made by me. I have reviewed and approved this document for accuracy prior to leaving the patient care area.  8:51 AM, 07/03/18    Signed Electronically by: Pedro Brasher MD    Copy of this evaluation report is provided to requesting physician.    [facility] Preop Guidelines    Revised Cardiac Risk Index

## 2018-07-02 NOTE — PATIENT INSTRUCTIONS
Before Your Surgery    Call your surgeon if there is any change in your health. This includes signs of a cold or flu (such as a sore throat, runny nose, cough, rash or fever).    Do not smoke, drink alcohol or take over the counter medicine (unless your surgeon or primary care doctor tells you to) for the 24 hours before and after surgery.    If you take prescribed drugs: Follow your doctor s orders about which medicines to take and which to stop until after surgery.  o Discontinue daily aspirin a week before surgery.  o Bring CPAP machine with you to hospital on day of surgery    Eating and drinking prior to surgery: follow the instructions from your surgeon    Take a shower or bath the night before surgery. Use the soap your surgeon gave you to gently clean your skin. If you do not have soap from your surgeon, use your regular soap. Do not shave or scrub the surgery site.  Wear clean pajamas and have clean sheets on your bed.

## 2018-07-03 ENCOUNTER — OFFICE VISIT (OUTPATIENT)
Dept: FAMILY MEDICINE | Facility: CLINIC | Age: 68
End: 2018-07-03
Payer: COMMERCIAL

## 2018-07-03 VITALS
TEMPERATURE: 98.1 F | SYSTOLIC BLOOD PRESSURE: 136 MMHG | WEIGHT: 299 LBS | DIASTOLIC BLOOD PRESSURE: 78 MMHG | HEIGHT: 71 IN | OXYGEN SATURATION: 94 % | HEART RATE: 70 BPM | BODY MASS INDEX: 41.86 KG/M2

## 2018-07-03 DIAGNOSIS — M79.672 PAIN OF LEFT HEEL: ICD-10-CM

## 2018-07-03 DIAGNOSIS — E66.01 MORBID OBESITY DUE TO EXCESS CALORIES (H): ICD-10-CM

## 2018-07-03 DIAGNOSIS — Z01.818 PREOP GENERAL PHYSICAL EXAM: Primary | ICD-10-CM

## 2018-07-03 DIAGNOSIS — G47.33 OSA (OBSTRUCTIVE SLEEP APNEA): ICD-10-CM

## 2018-07-03 DIAGNOSIS — I47.29 PAROXYSMAL VENTRICULAR TACHYCARDIA (H): ICD-10-CM

## 2018-07-03 PROCEDURE — 99214 OFFICE O/P EST MOD 30 MIN: CPT | Performed by: FAMILY MEDICINE

## 2018-07-03 NOTE — MR AVS SNAPSHOT
After Visit Summary   7/3/2018    Edis Beltre    MRN: [id number]           Patient Information     Date Of Birth          1950        Visit Information        Provider Department      7/3/2018 8:40 AM Pedro Brasher MD [facility]        Today's Diagnoses     Preop general physical exam    -  1    Pain of left heel        Morbid obesity due to excess calories (H)        LAURA (obstructive sleep apnea)        h/o Paroxysmal ventricular tachycardia (H) - s/p ablation           Care Instructions      Before Your Surgery    Call your surgeon if there is any change in your health. This includes signs of a cold or flu (such as a sore throat, runny nose, cough, rash or fever).    Do not smoke, drink alcohol or take over the counter medicine (unless your surgeon or primary care doctor tells you to) for the 24 hours before and after surgery.    If you take prescribed drugs: Follow your doctor s orders about which medicines to take and which to stop until after surgery.  o Discontinue daily aspirin a week before surgery.  o Bring CPAP machine with you to hospital on day of surgery    Eating and drinking prior to surgery: follow the instructions from your surgeon    Take a shower or bath the night before surgery. Use the soap your surgeon gave you to gently clean your skin. If you do not have soap from your surgeon, use your regular soap. Do not shave or scrub the surgery site.  Wear clean pajamas and have clean sheets on your bed.           Follow-ups after your visit        Who to contact     If you have questions or need follow up information about today's clinic visit or your schedule please contact [facility] directly at [phone].  Normal or non-critical lab and imaging results will be communicated to you by MyChart, letter or phone within 4 business days after the clinic has received the results. If you do not hear from us within 7 days, please contact the  "clinic through [employer]Cloud[employer] or phone. If you have a critical or abnormal lab result, we will notify you by phone as soon as possible.  Submit refill requests through [facility] or call your pharmacy and they will forward the refill request to us. Please allow 3 business days for your refill to be completed.          Additional Information About Your Visit        Link[employer] Information     [facility] gives you secure access to your electronic health record. If you see a primary care provider, you can also send messages to your care team and make appointments. If you have questions, please call your primary care clinic.  If you do not have a primary care provider, please call [phone] and they will assist you.        Care EveryWhere ID     This is your Care EveryWhere ID. This could be used by other organizations to access your [place] medical records  [id number]        Your Vitals Were     Pulse Temperature Height Pulse Oximetry BMI (Body Mass Index)       70 98.1  F (36.7  C) (Oral) 5' 11\" (1.803 m) 94% 41.7 kg/m2        Blood Pressure from Last 3 Encounters:   07/03/18 136/78   05/02/18 138/72   04/22/18 143/71    Weight from Last 3 Encounters:   07/03/18 299 lb (135.6 kg)   04/21/18 291 lb 8 oz (132.2 kg)   03/16/18 300 lb (136.1 kg)              Today, you had the following     No orders found for display       Primary Care Provider Office Phone # Fax #    Pedro Brasher [phone]       [address] [place] [address]        Equal Access to Services     [facility] CenterHAFSA : Hadii tyree ku hadasho Soeslyali, waaxda luqadaha, qaybta kaalmada adeegyada, edgardo prado. So [facility] [phone].    ATENCIÓN: Si habla español, tiene a medel disposición servicios gratuitos de asistencia lingüística. Llame al [phone].    We comply with applicable federal civil rights laws and [place] laws. We do not discriminate on the basis of race, color, national origin, age, disability, " sex, sexual orientation, or gender identity.            Thank you!     Thank you for choosing [facility]  for your care. Our goal is always to provide you with excellent care. Hearing back from our patients is one way we can continue to improve our services. Please take a few minutes to complete the written survey that you may receive in the mail after your visit with us. Thank you!             Your Updated Medication List - Protect others around you: Learn how to safely use, store and throw away your medicines at www.disposemymeds.org.          This list is accurate as of 7/3/18  9:01 AM.  Always use your most recent med list.                   Brand Name Dispense Instructions for use Diagnosis    ACIDOPHILUS PROBIOTIC BLEND Caps      Take 1 capsule by mouth daily    Diarrhea, unspecified type       aspirin 81 MG tablet     30 tablet    Take 81 mg by mouth daily        Daily Multivitamin Tabs      Take 1 tablet by mouth daily        fish oil-omega-3 fatty acids 1000 MG capsule      Take 2 g by mouth daily

## 2018-07-30 ENCOUNTER — APPOINTMENT (OUTPATIENT)
Age: 68
Setting detail: DERMATOLOGY
End: 2018-08-01

## 2018-07-30 DIAGNOSIS — L57.0 ACTINIC KERATOSIS: ICD-10-CM

## 2018-07-30 DIAGNOSIS — L82.0 INFLAMED SEBORRHEIC KERATOSIS: ICD-10-CM

## 2018-07-30 PROCEDURE — OTHER LIQUID NITROGEN: OTHER

## 2018-07-30 PROCEDURE — 17000 DESTRUCT PREMALG LESION: CPT

## 2018-07-30 PROCEDURE — 17003 DESTRUCT PREMALG LES 2-14: CPT

## 2018-07-30 PROCEDURE — 99213 OFFICE O/P EST LOW 20 MIN: CPT | Mod: 25

## 2018-07-30 PROCEDURE — OTHER COUNSELING: OTHER

## 2018-07-30 PROCEDURE — OTHER MIPS QUALITY: OTHER

## 2018-07-30 ASSESSMENT — LOCATION SIMPLE DESCRIPTION DERM
LOCATION SIMPLE: NECK
LOCATION SIMPLE: RIGHT FOREHEAD
LOCATION SIMPLE: RIGHT CHEEK
LOCATION SIMPLE: LEFT CHEEK
LOCATION SIMPLE: RIGHT PRETIBIAL REGION
LOCATION SIMPLE: RIGHT EAR

## 2018-07-30 ASSESSMENT — LOCATION ZONE DERM
LOCATION ZONE: EAR
LOCATION ZONE: NECK
LOCATION ZONE: FACE
LOCATION ZONE: LEG

## 2018-07-30 ASSESSMENT — LOCATION DETAILED DESCRIPTION DERM
LOCATION DETAILED: RIGHT INFERIOR HELIX
LOCATION DETAILED: RIGHT INFERIOR CENTRAL MALAR CHEEK
LOCATION DETAILED: RIGHT DISTAL PRETIBIAL REGION
LOCATION DETAILED: RIGHT SUPERIOR CENTRAL MALAR CHEEK
LOCATION DETAILED: LEFT LATERAL MALAR CHEEK
LOCATION DETAILED: LEFT INFERIOR PREAURICULAR CHEEK
LOCATION DETAILED: RIGHT SUPERIOR FOREHEAD
LOCATION DETAILED: LEFT CENTRAL MALAR CHEEK
LOCATION DETAILED: LEFT SUPERIOR LATERAL NECK
LOCATION DETAILED: LEFT SUPERIOR CENTRAL MALAR CHEEK

## 2018-07-30 NOTE — PROCEDURE: LIQUID NITROGEN
Consent: The patient's consent was obtained including but not limited to risks of crusting, scabbing, blistering, scarring, darker or lighter pigmentary change, recurrence, incomplete removal and infection.
Render Post-Care Instructions In Note?: yes
Duration Of Freeze Thaw-Cycle (Seconds): 10
Number Of Freeze-Thaw Cycles: 1 freeze-thaw cycle
Post-Care Instructions: I reviewed with the patient in detail post-care instructions. Patient is to wear sunprotection, and avoid picking at any of the treated lesions. Pt may apply Vaseline to crusted or scabbing areas.
Detail Level: Detailed

## 2018-08-01 NOTE — PROCEDURE: MIPS QUALITY
Quality 226: Preventive Care And Screening: Tobacco Use: Screening And Cessation Intervention: Patient screened for tobacco and never smoked
Quality 431: Preventive Care And Screening: Unhealthy Alcohol Use - Screening: Patient screened for unhealthy alcohol use using a single question and scores less than 2 times per year
Quality 110: Preventive Care And Screening: Influenza Immunization: Influenza Immunization previously received during influenza season
Quality 130: Documentation Of Current Medications In The Medical Record: Current Medications Documented
Detail Level: Detailed
no

## 2018-10-09 ENCOUNTER — TELEPHONE (OUTPATIENT)
Dept: FAMILY MEDICINE | Facility: CLINIC | Age: 68
End: 2018-10-09

## 2018-10-09 DIAGNOSIS — E78.5 HYPERLIPIDEMIA LDL GOAL <100: Primary | ICD-10-CM

## 2018-10-09 DIAGNOSIS — R73.03 PREDIABETES: ICD-10-CM

## 2018-10-09 DIAGNOSIS — K21.9 GASTROESOPHAGEAL REFLUX DISEASE WITHOUT ESOPHAGITIS: ICD-10-CM

## 2018-10-09 DIAGNOSIS — E66.01 MORBID OBESITY DUE TO EXCESS CALORIES (H): ICD-10-CM

## 2018-10-09 DIAGNOSIS — Z12.5 SCREENING FOR PROSTATE CANCER: ICD-10-CM

## 2018-10-09 NOTE — TELEPHONE ENCOUNTER
Reason for Call: Request for an order or referral:    Order or referral being requested: fasting Physical labs    Date needed: as soon as possible    Has the patient been seen by the PCP for this problem? YES    Additional comments: pt has a lab appt 10/11/18    Phone number Patient can be reached at:  Home number on file [phone] (home)    Best Time:      Can we leave a detailed message on this number?  YES    Call taken on 10/9/2018 at 8:50 AM by Vanessa Michelle

## 2018-10-09 NOTE — TELEPHONE ENCOUNTER
Routing to RL to review and order.    Belkys Gautam, BS, RN, N  [facility]) [phone]

## 2018-10-11 DIAGNOSIS — E78.5 HYPERLIPIDEMIA LDL GOAL <100: ICD-10-CM

## 2018-10-11 DIAGNOSIS — Z12.5 SCREENING FOR PROSTATE CANCER: ICD-10-CM

## 2018-10-11 DIAGNOSIS — K21.9 GASTROESOPHAGEAL REFLUX DISEASE WITHOUT ESOPHAGITIS: ICD-10-CM

## 2018-10-11 DIAGNOSIS — R73.03 PREDIABETES: ICD-10-CM

## 2018-10-11 LAB
ALBUMIN SERPL-MCNC: 3.7 G/DL (ref 3.4–5)
ALP SERPL-CCNC: 58 U/L (ref 40–150)
ALT SERPL W P-5'-P-CCNC: 17 U/L (ref 0–70)
ANION GAP SERPL CALCULATED.3IONS-SCNC: 10 MMOL/L (ref 3–14)
AST SERPL W P-5'-P-CCNC: 23 U/L (ref 0–45)
BILIRUB SERPL-MCNC: 0.9 MG/DL (ref 0.2–1.3)
BUN SERPL-MCNC: 15 MG/DL (ref 7–30)
CALCIUM SERPL-MCNC: 9.2 MG/DL (ref 8.5–10.1)
CHLORIDE SERPL-SCNC: 105 MMOL/L (ref 94–109)
CHOLEST SERPL-MCNC: 187 MG/DL
CO2 SERPL-SCNC: 26 MMOL/L (ref 20–32)
CREAT SERPL-MCNC: 1.05 MG/DL (ref 0.66–1.25)
ERYTHROCYTE [DISTWIDTH] IN BLOOD BY AUTOMATED COUNT: 13.2 % (ref 10–15)
GFR SERPL CREATININE-BSD FRML MDRD: 70 ML/MIN/1.7M2
GLUCOSE SERPL-MCNC: 106 MG/DL (ref 70–99)
HCT VFR BLD AUTO: 49.6 % (ref 40–53)
HDLC SERPL-MCNC: 34 MG/DL
HGB BLD-MCNC: 16.8 G/DL (ref 13.3–17.7)
LDLC SERPL CALC-MCNC: 115 MG/DL
MCH RBC QN AUTO: 32.7 PG (ref 26.5–33)
MCHC RBC AUTO-ENTMCNC: 33.9 G/DL (ref 31.5–36.5)
MCV RBC AUTO: 97 FL (ref 78–100)
NONHDLC SERPL-MCNC: 153 MG/DL
PLATELET # BLD AUTO: 244 10E9/L (ref 150–450)
POTASSIUM SERPL-SCNC: 3.9 MMOL/L (ref 3.4–5.3)
PROT SERPL-MCNC: 7.5 G/DL (ref 6.8–8.8)
PSA SERPL-ACNC: 1.84 UG/L (ref 0–4)
RBC # BLD AUTO: 5.13 10E12/L (ref 4.4–5.9)
SODIUM SERPL-SCNC: 141 MMOL/L (ref 133–144)
TRIGL SERPL-MCNC: 192 MG/DL
WBC # BLD AUTO: 5.4 10E9/L (ref 4–11)

## 2018-10-11 PROCEDURE — 80061 LIPID PANEL: CPT | Performed by: FAMILY MEDICINE

## 2018-10-11 PROCEDURE — G0103 PSA SCREENING: HCPCS | Performed by: FAMILY MEDICINE

## 2018-10-11 PROCEDURE — 36415 COLL VENOUS BLD VENIPUNCTURE: CPT | Performed by: FAMILY MEDICINE

## 2018-10-11 PROCEDURE — 85027 COMPLETE CBC AUTOMATED: CPT | Performed by: FAMILY MEDICINE

## 2018-10-11 PROCEDURE — 80053 COMPREHEN METABOLIC PANEL: CPT | Performed by: FAMILY MEDICINE

## 2018-10-11 NOTE — PROGRESS NOTES
Dear Edis,    Here is a summary of your recent test results:  -Liver and gallbladder tests (ALT,AST, Alk phos,bilirubin) are normal.  -Kidney function (GFR) is normal.  -Sodium is normal.  -Potassium is normal.  -Glucose is slight elevated and may be a sign of early diabetes (prediabetes). ADVISE:: eating a low carbohydrate diet, exercising, trying to lose weight (if necessary) and rechecking your glucose level in 12 months.  -LDL(bad) cholesterol level is elevated, HDL(good) cholesterol level is low and your triglycerides are elevated which can increase your heart disease risk.  A diet high in fat and simple carbohydrates, genetics and being overweight can contribute to this. ADVISE: trying a cholesterol medication to lower your heart and stroke disease risk - please let me know if I can send in a prescription.  Exercising, eating a low fat, low carbohydrate diet, losing weight, and omega-3 fatty acids (fish oil) 0517-0931 mg daily are helpful to improve this.  Also, rechecking your fasting cholesterol panel in 6 months is recommended.  -PSA (prostate specific antigen) test is normal.  This indicates a low likelihood of prostate cancer.  ADVISE: yearly recheck.   -Normal red blood cell (hgb) levels, normal white blood cell count and normal platelet levels.    For additional lab test information, labtestsonline.org is an excellent reference.    Thank you very much for trusting me and [facility].     Healthy regards,  Chalino Brasher MD

## 2018-10-15 ENCOUNTER — OFFICE VISIT (OUTPATIENT)
Dept: FAMILY MEDICINE | Facility: CLINIC | Age: 68
End: 2018-10-15
Payer: COMMERCIAL

## 2018-10-15 VITALS
WEIGHT: 300 LBS | DIASTOLIC BLOOD PRESSURE: 78 MMHG | TEMPERATURE: 98.6 F | SYSTOLIC BLOOD PRESSURE: 136 MMHG | OXYGEN SATURATION: 94 % | BODY MASS INDEX: 42 KG/M2 | HEART RATE: 102 BPM | HEIGHT: 71 IN

## 2018-10-15 DIAGNOSIS — Z00.00 ENCOUNTER FOR MEDICARE ANNUAL WELLNESS EXAM: Primary | ICD-10-CM

## 2018-10-15 DIAGNOSIS — R73.03 PREDIABETES: ICD-10-CM

## 2018-10-15 DIAGNOSIS — Z00.00 MEDICARE ANNUAL WELLNESS VISIT, SUBSEQUENT: ICD-10-CM

## 2018-10-15 DIAGNOSIS — G47.33 OSA (OBSTRUCTIVE SLEEP APNEA): ICD-10-CM

## 2018-10-15 DIAGNOSIS — E66.01 MORBID OBESITY DUE TO EXCESS CALORIES (H): ICD-10-CM

## 2018-10-15 DIAGNOSIS — I47.29 PAROXYSMAL VENTRICULAR TACHYCARDIA (H): ICD-10-CM

## 2018-10-15 DIAGNOSIS — Z23 NEED FOR PROPHYLACTIC VACCINATION AND INOCULATION AGAINST INFLUENZA: ICD-10-CM

## 2018-10-15 DIAGNOSIS — K21.9 GASTROESOPHAGEAL REFLUX DISEASE WITHOUT ESOPHAGITIS: ICD-10-CM

## 2018-10-15 DIAGNOSIS — E78.5 HYPERLIPIDEMIA LDL GOAL <100: ICD-10-CM

## 2018-10-15 PROCEDURE — G0439 PPPS, SUBSEQ VISIT: HCPCS | Performed by: FAMILY MEDICINE

## 2018-10-15 PROCEDURE — 90662 IIV NO PRSV INCREASED AG IM: CPT | Performed by: FAMILY MEDICINE

## 2018-10-15 PROCEDURE — G0008 ADMIN INFLUENZA VIRUS VAC: HCPCS | Performed by: FAMILY MEDICINE

## 2018-10-15 NOTE — PROGRESS NOTES
SUBJECTIVE:   Edis Beltre is a 68 year old male who presents for Preventive Visit.    Are you in the first 12 months of your Medicare Part B coverage?  No    Healthy Habits:    Do you get at least three servings of calcium containing foods daily (dairy, green leafy vegetables, etc.)? Milk allergy    Amount of exercise or daily activities, outside of work: 3 day(s) per week    Problems taking medications regularly not applicable    Medication side effects: No    Have you had an eye exam in the past two years? yes    Do you see a dentist twice per year? yes    Do you have sleep apnea, excessive snoring or daytime drowsiness?sleep apnea      Ability to successfully perform activities of daily living: Yes, no assistance needed    Home safety:  none identified     Hearing impairment: some loss    Fall risk:  Fallen 2 or more times in the past year?: No  Any fall with injury in the past year?: No      Nasal drainage- smell is ok but does not have the taste anymore  Patient complains of nasal drainage in the fall and spring. He can still smell food as normal but feels like it doesn't taste the way it used to. Patient can't taste salt as much as he used to. He has sinus congestion but no throat pain or itchy eyes. He has taken Claritin-D for symptoms, has not tried saline spray or neti pot. Patient has not had numbness or weakness in extremities, no paresthesias. Patient does not think he has been exposed to more dust than usual recently.     COGNITIVE SCREEN  1) Repeat 3 items (Leader, Season, Table)    2) Clock draw: NORMAL  3) 3 item recall: Recalls 3 objects  Results: 3 items recalled: COGNITIVE IMPAIRMENT LESS LIKELY    Mini-CogTM Copyright S Sharmin. Licensed by the author for use in [facility]; reprinted with permission (linda@.[facility]). All rights reserved.                Reviewed and updated as needed this visit by clinical staff  Tobacco  Allergies  Meds  Med Hx  Surg Hx  Fam Hx  Soc Hx         Reviewed and updated as needed this visit by Provider  Surg Hx  Fam Hx        Social History   Substance Use Topics     Smoking status: Never Smoker     Smokeless tobacco: Never Used     Alcohol use Yes      Comment: Minimal       If you drink alcohol do you typically have >3 drinks per day or >7 drinks per week? No                        Today's PHQ-2 Score:   PHQ-2 ( 1999 Pfizer) 10/15/2018 7/3/2018   Q1: Little interest or pleasure in doing things 0 0   Q2: Feeling down, depressed or hopeless 0 0   PHQ-2 Score 0 0   Q1: Little interest or pleasure in doing things - -   Q2: Feeling down, depressed or hopeless - -   PHQ-2 Score - -       Do you feel safe in your environment - Yes    Do you have a Health Care Directive?: Yes: Advance Directive has been received and scanned.    Current providers sharing in care for this patient include:   Patient Care Team:  Pedro Brasher MD as PCP - General    The following health maintenance items are reviewed in Epic and correct as of today:  Health Maintenance   Topic Date Due     MEDICARE ANNUAL WELLNESS VISIT  05/24/1968     INFLUENZA VACCINE (1) 09/01/2018     ADVANCE DIRECTIVE PLANNING Q5 YRS  01/17/2019     FALL RISK ASSESSMENT  02/20/2019     PHQ-2 Q1 YR  07/03/2019     BMP Q1 YR  10/11/2019     LIPID MONITORING Q1 YEAR  10/11/2019     PSA Q1 YR  10/11/2019     TETANUS Q10 YR  04/19/2021     COLONOSCOPY Q5 YR  03/30/2022     PNEUMOCOCCAL  Completed     AORTIC ANEURYSM SCREENING (SYSTEM ASSIGNED)  Completed     HEPATITIS C SCREENING  Addressed     Patient Active Problem List   Diagnosis     Gastroesophageal reflux disease without esophagitis     LAURA (obstructive sleep apnea)     FAMILY HX GI MALIGNANCY     Hyperlipidemia LDL goal <100     Morbid obesity due to excess calories (H)     Prediabetes     Advanced directives, counseling/discussion     S/P total knee arthroplasty     Premature ventricular contraction     Postprocedural pneumothorax - 2014      Metatarsalgia of right foot     Jaw pain     Palpitations     h/o Paroxysmal ventricular tachycardia (H) - s/p ablation      Past Surgical History:   Procedure Laterality Date     ANKLE SURGERY      left     ARTHROPLASTY KNEE  4/14/2014    Procedure: Left Total Knee Arthroplasty ;  Surgeon: Glalito Willis MD;  Location: [facility] OR     BIOPSY      Skin doctor     CHOLECYSTECTOMY, LAPOROSCOPIC  2007     COLONOSCOPY       EYE SURGERY  2013    Cataract surgery on both eyes     H ABLATION PVC  2/26/16     STRIP VEIN      left leg     SURGICAL HISTORY OF -       L VEIN STRIPPING     SURGICAL HISTORY OF -       L MENISCUS- ARTHROSCOPY     THORACIC SURGERY  5/2014    VATS , wedge resction and mechanical pleurodesis -[place], [place]       Social History   Substance Use Topics     Smoking status: Never Smoker     Smokeless tobacco: Never Used     Alcohol use Yes      Comment: Minimal     Family History   Problem Relation Age of Onset     Colon Cancer Father      Chronic Obstructive Pulmonary Disease Father      HEART DISEASE Mother      Has a pacemaker     Pacemaker Mother      Other - See Comments Brother      Lyme DZ - infected his heart and brain     No Known Problems Son      Genetic Disorder Paternal Grandmother      Diabetes No family hx of      Hypertension No family hx of      Prostate Cancer No family hx of      Coronary Artery Disease No family hx of          Current Outpatient Prescriptions   Medication Sig Dispense Refill     aspirin 81 MG tablet Take 81 mg by mouth daily  30 tablet      DAILY MULTIVITAMIN OR TABS Take 1 tablet by mouth daily       fish oil-omega-3 fatty acids 1000 MG capsule Take 2 g by mouth daily       omeprazole (PRILOSEC) 20 MG CR capsule Take 1 capsule (20 mg) by mouth daily 90 capsule 0     Probiotic Product (ACIDOPHILUS PROBIOTIC BLEND) CAPS Take 1 capsule by mouth daily       Allergies   Allergen Reactions     Atorvastatin      Other reaction(s): Muscle Aches/Weakness  "        ROS:  Constitutional, HEENT, cardiovascular, pulmonary, GI, , musculoskeletal, neuro, skin, endocrine and psych systems are negative, except as otherwise noted.    This document serves as a record of the services and decisions personally performed by OSCAR BROOKE. It was created on his/her behalf by Anderson Harding, a trained medical scribe. The creation of this document is based on the provider's statements to the medical scribe. Anderson Harding, October 15, 2018 2:09 PM  OBJECTIVE:   /78  Pulse 102  Temp 98.6  F (37  C) (Oral)  Ht 5' 11\" (1.803 m)  Wt 300 lb (136.1 kg)  SpO2 94%  BMI 41.84 kg/m2 Estimated body mass index is 41.84 kg/(m^2) as calculated from the following:    Height as of this encounter: 5' 11\" (1.803 m).    Weight as of this encounter: 300 lb (136.1 kg).  EXAM:   GENERAL: healthy, alert and no distress  EYES: Eyes grossly normal to inspection, PERRL and conjunctivae and sclerae normal  HENT: ear canals and TM's normal, nose and mouth without ulcers or lesions  NECK: no adenopathy, no asymmetry, masses, or scars and thyroid normal to palpation  RESP: lungs clear to auscultation - no rales, rhonchi or wheezes  CV: regular rate and rhythm, normal S1 S2, no S3 or S4, no murmur, click or rub, no peripheral edema and peripheral pulses strong  ABDOMEN: soft, nontender, no hepatosplenomegaly, no masses and bowel sounds normal   (male): normal male genitalia without lesions or urethral discharge, no hernia  RECTAL: normal sphincter tone, no rectal masses, prostate 1+, smooth, nontender without nodules or masses  MS: no gross musculoskeletal defects noted, no edema  SKIN: no suspicious lesions or rashes. Scattered seborrheic keratosis on back   NEURO: Normal strength and tone, mentation intact and speech normal  PSYCH: mentation appears normal, affect normal/bright    Results for orders placed or performed in visit on 10/11/18   Comprehensive metabolic panel   Result Value Ref Range "    Sodium 141 133 - 144 mmol/L    Potassium 3.9 3.4 - 5.3 mmol/L    Chloride 105 94 - 109 mmol/L    Carbon Dioxide 26 20 - 32 mmol/L    Anion Gap 10 3 - 14 mmol/L    Glucose 106 (H) 70 - 99 mg/dL    Urea Nitrogen 15 7 - 30 mg/dL    Creatinine 1.05 0.66 - 1.25 mg/dL    GFR Estimate 70 >60 mL/min/1.7m2    GFR Estimate If Black 84 >60 mL/min/1.7m2    Calcium 9.2 8.5 - 10.1 mg/dL    Bilirubin Total 0.9 0.2 - 1.3 mg/dL    Albumin 3.7 3.4 - 5.0 g/dL    Protein Total 7.5 6.8 - 8.8 g/dL    Alkaline Phosphatase 58 40 - 150 U/L    ALT 17 0 - 70 U/L    AST 23 0 - 45 U/L   Lipid panel reflex to direct LDL Fasting   Result Value Ref Range    Cholesterol 187 <200 mg/dL    Triglycerides 192 (H) <150 mg/dL    HDL Cholesterol 34 (L) >39 mg/dL    LDL Cholesterol Calculated 115 (H) <100 mg/dL    Non HDL Cholesterol 153 (H) <130 mg/dL   Prostate spec antigen screen   Result Value Ref Range    PSA 1.84 0 - 4 ug/L   CBC with platelets   Result Value Ref Range    WBC 5.4 4.0 - 11.0 10e9/L    RBC Count 5.13 4.4 - 5.9 10e12/L    Hemoglobin 16.8 13.3 - 17.7 g/dL    Hematocrit 49.6 40.0 - 53.0 %    MCV 97 78 - 100 fl    MCH 32.7 26.5 - 33.0 pg    MCHC 33.9 31.5 - 36.5 g/dL    RDW 13.2 10.0 - 15.0 %    Platelet Count 244 150 - 450 10e9/L     The 10-year ASCVD risk score (Jerson MAURY Jr, et al., 2013) is: 22.7%    Values used to calculate the score:      Age: 68 years      Sex: Male      Is Non- : No      Diabetic: No      Tobacco smoker: No      Systolic Blood Pressure: 136 mmHg      Is BP treated: No      HDL Cholesterol: 34 mg/dL      Total Cholesterol: 187 mg/dL    ASSESSMENT / PLAN:   Edis was seen today for wellness visit.    Diagnoses and all orders for this visit:    Encounter for Medicare annual wellness exam    Hyperlipidemia LDL goal <100  Patient is not interested in restarting a statin.   Gastroesophageal reflux disease without esophagitis    LAURA (obstructive sleep apnea)  Stable. Continue CPAP     Morbid  "obesity due to excess calories (H)  Weight management plan: Discussed healthy diet and exercise guidelines and patient will follow up in 3 months in clinic to re-evaluate    Prediabetes  Discussed dietary changes to lower blood sugar     h/o Paroxysmal ventricular tachycardia (H) - s/p ablation     Need for prophylactic vaccination and inoculation against influenza  -     HC FLU VAC PRESRV FREE QUAD SPLIT VIR 3+YRS IM  [[id number]]  -          ADMIN VACCINE, FIRST [[id number]]    Medicare annual wellness visit, subsequent        End of Life Planning:  Patient currently has an advanced directive: Yes.  Practitioner is supportive of decision.    COUNSELING:  Reviewed preventive health counseling, as reflected in patient instructions  Special attention given to:       Regular exercise       Healthy diet/nutrition       Vision screening       Hearing screening       Dental care       Immunizations    Vaccinated for: Influenza          BP Readings from Last 1 Encounters:   10/15/18 136/78     Estimated body mass index is 41.84 kg/(m^2) as calculated from the following:    Height as of this encounter: 5' 11\" (1.803 m).    Weight as of this encounter: 300 lb (136.1 kg).    BP Screening:   Last 3 BP Readings:    BP Readings from Last 3 Encounters:   10/15/18 136/78   07/03/18 136/78   05/02/18 138/72       The following was recommended to the patient:  Re-screen BP within a year and recommended lifestyle modifications  Weight management plan: Discussed healthy diet and exercise guidelines and patient will follow up in 12 months in clinic to re-evaluate.     reports that he has never smoked. He has never used smokeless tobacco.      Appropriate preventive services were discussed with this patient, including applicable screening as appropriate for cardiovascular disease, diabetes, osteopenia/osteoporosis, and glaucoma.  As appropriate for age/gender, discussed screening for colorectal cancer, prostate cancer, breast cancer, and " cervical cancer. Checklist reviewing preventive services available has been given to the patient.    Reviewed patients plan of care and provided an AVS. The Complex Care Plan (for patients with higher acuity and needing more deliberate coordination of services) for Edis meets the Care Plan requirement. This Care Plan has been established and reviewed with the Patient.    Counseling Resources:  ATP IV Guidelines  Pooled Cohorts Equation Calculator  Breast Cancer Risk Calculator  FRAX Risk Assessment  ICSI Preventive Guidelines  Dietary Guidelines for Americans, 2010  USDA's MyPlate  ASA Prophylaxis  Lung CA Screening    The information in this document, created by the medical scribe for me, accurately reflects the services I personally performed and the decisions made by me. I have reviewed and approved this document for accuracy.   Pedro Brasher MD  [facility] [place]

## 2018-10-15 NOTE — PATIENT INSTRUCTIONS
Preventive Health Recommendations:   Male Ages 65 and over    Yearly exam:             See your health care provider every year in order to  o   Review health changes.   o   Discuss preventive care.    o   Review your medicines if your doctor has prescribed any.    Talk with your health care provider about whether you should have a test to screen for prostate cancer (PSA).    Every 3 years, have a diabetes test (fasting glucose). If you are at risk for diabetes, you should have this test more often.    Every 5 years, have a cholesterol test. Have this test more often if you are at risk for high cholesterol or heart disease.     Every 10 years, have a colonoscopy. Or, have a yearly FIT test (stool test). These exams will check for colon cancer.    Talk to with your health care provider about screening for Abdominal Aortic Aneurysm if you have a family history of AAA or have a history of smoking.    Shots:     Get a flu shot each year.     Get a tetanus shot every 10 years.     Talk to your doctor about your pneumonia vaccines. There are now two you should receive - Pneumovax (PPSV 23) and Prevnar (PCV 13).     Talk to your pharmacist about a shingles vaccine.     Talk to your doctor about the hepatitis B vaccine.  Nutrition:     Eat at least 5 servings of fruits and vegetables each day.     Eat whole-grain bread, whole-wheat pasta and brown rice instead of white grains and rice.     Get adequate Calcium and Vitamin D.   Lifestyle    Exercise for at least 150 minutes a week (30 minutes a day, 5 days a week). This will help you control your weight and prevent disease.     Limit alcohol to one drink per day.     No smoking.     Wear sunscreen to prevent skin cancer.     See your dentist every six months for an exam and cleaning.     See your eye doctor every 1 to 2 years to screen for conditions such as glaucoma, macular degeneration, cataracts, etc         Services Typically covered by Medicare Recommended Completed    Vaccines    Pneumonoccol    Influenza    Hepatitis B (if medium/high risk)     Once for patients after age 65    Yearly  Medium/high risk factors:    End Stage Kidney Disease    Hemophiliacs who received Factor XIII or IX concentrates    Clients of institutions for developmentally disabled    Persons who live in same house as a Hepatitis B carrier    Homosexual men    Illicit injectable drug users    Health care workers     Mammogram Covered: One-time screen between age 35-39, annually for age 40+     Pap and Pelvic Exam Covered: Annually if  high risk,  or childbearing age with abnormal Pap in last 3 years.  Q24 months for all other women     Prostate Cancer Screening    Digital rectal exam    PSA Covered: Annually for all men > age 50     Corolrectal Cancer Screening Screening colonoscopy every 10 years, more often for high risk patients     Diabetes Self-Management Training Requires referral by treating physician for patient with diabetes     Diabetes Screening    Fasting blood sugar or glucose tolerance test   Once yearly, twice yearly if prediabetic     Cardiovascular Screening Blood Tests    Total Cholesterol    HDL    Triglycerides Every 5 years     Medical Nutrition Therapy for Diabetes or Renal Disease Requires referral by treating physician for patient with diabetes or kidney disease     Glaucoma Screening Annually for patients with one of the following risk factors:    Diabetes Mellitus    Family history of Glaucoma    -American age 50 and over    -American age 65 and over     Bone Mass Measurement Every 24 months if one of the following risk factors:    Estrogen deficiency    Vertebral abnormalities on x-ray indicative of Osteoporosis, Osteopenia, or Vertebral fracture    Receiving/expected to receive the equivalent of at least 5 mg of Prednisone per day for > 3 months    Hyperparathyroidism    Patient being monitored for response to Osteoporosis Therapy     One-time AAA screen  Must be  ordered as part of Medicare IPPE   Any patient with a family history of AAA    Males Age 65-75, with history of smoking at least 100 cigarettes in lifetime     Smoking Cessation Counseling Beneficiaries who use tobacco are eligible to receive 2 cessation attempts per year; each attempt includes maximum of 4 sessions     HIV Screening Annually for beneficiaries at increased risk:       Increased risk for HIV infection is defined in the  National Coverage Determinations (NCD) Manual,  Publication 100-03 Sections 190.14 (diagnostic) and 210.7 (screening). See http://www.cms.gov/manuals/downloads/tzr046a4_Shrv4.pdf and http://www.cms.gov/manuals/downloads/lig374c9_Zfna7.pdf on the Internet.  Three times per pregnancy for beneficiaries who are pregnant.     Future Annual Wellness Visit Annually, for all beneficiaries.       Preventive Health Recommendations:       Male Ages 65 and over    Yearly exam:             See your health care provider every year in order to  o   Review health changes.   o   Discuss preventive care.    o   Review your medicines if your doctor has prescribed any.    Talk with your health care provider about whether you should have a test to screen for prostate cancer (PSA).    Every 3 years, have a diabetes test (fasting glucose). If you are at risk for diabetes, you should have this test more often.    Every 5 years, have a cholesterol test. Have this test more often if you are at risk for high cholesterol or heart disease.     Every 10 years, have a colonoscopy. Or, have a yearly FIT test (stool test). These exams will check for colon cancer.    Talk to with your health care provider about screening for Abdominal Aortic Aneurysm if you have a family history of AAA or have a history of smoking.  Shots:     Get a flu shot each year.     Get a tetanus shot every 10 years.     Talk to your doctor about your pneumonia vaccines. There are now two you should receive - Pneumovax (PPSV 23) and Prevnar  (PCV 13).    Talk to your pharmacist about a shingles vaccine.     Talk to your doctor about the hepatitis B vaccine.  Nutrition:     Eat at least 5 servings of fruits and vegetables each day.     Eat whole-grain bread, whole-wheat pasta and brown rice instead of white grains and rice.     Get adequate Calcium and Vitamin D.   Lifestyle    Exercise for at least 150 minutes a week (30 minutes a day, 5 days a week). This will help you control your weight and prevent disease.     Limit alcohol to one drink per day.     No smoking.     Wear sunscreen to prevent skin cancer.     See your dentist every six months for an exam and cleaning.     See your eye doctor every 1 to 2 years to screen for conditions such as glaucoma, macular degeneration and cataracts.

## 2018-10-15 NOTE — PROGRESS NOTES
"    SUBJECTIVE:   CC: Edis Beltre is an 68 year old male who presents for preventative health visit.     Healthy Habits:    Do you get at least three servings of calcium containing foods daily (dairy, green leafy vegetables, etc.)? {YES/NO, DAIRY INTAKE:435709::\"yes\"}    Amount of exercise or daily activities, outside of work: {AMOUNT EXERCISE:206404}    Problems taking medications regularly {Yes /No default:612119::\"No\"}    Medication side effects: {Yes /No default.:342982::\"No\"}    Have you had an eye exam in the past two years? {YESNOBLANK:167171}    Do you see a dentist twice per year? {YESNOBLANK:175570}    Do you have sleep apnea, excessive snoring or daytime drowsiness?{YESNOBLANK:760272}  {Outside tests to abstract? :675017}     {additional problems to add (Optional):162071}    Today's PHQ-2 Score:   PHQ-2 ( 1999 Pfizer) 7/3/2018 12/27/2015   Q1: Little interest or pleasure in doing things 0 -   Q2: Feeling down, depressed or hopeless 0 -   PHQ-2 Score 0 -   Q1: Little interest or pleasure in doing things - Not at all   Q2: Feeling down, depressed or hopeless - Not at all   PHQ-2 Score - 0     {PHQ-2 LOOK IN ASSESSMENTS :054015}  Abuse: Current or Past(Physical, Sexual or Emotional)- {YES/NO/NA:016701}  Do you feel safe in your environment - {YES/NO/NA:823066}    Social History   Substance Use Topics     Smoking status: Never Smoker     Smokeless tobacco: Never Used     Alcohol use Yes      Comment: Minimal      If you drink alcohol do you typically have >3 drinks per day or >7 drinks per week? {ETOH :649226}                      Last PSA:   PSA   Date Value Ref Range Status   10/11/2018 1.84 0 - 4 ug/L Final     Comment:     Assay Method:  Chemiluminescence using Siemens Vista analyzer       Reviewed orders with patient. Reviewed health maintenance and updated orders accordingly - {Yes/No:833744::\"Yes\"}  {Chronicprobdata (Optional):554339}    Reviewed and updated as needed this visit by clinical " "staff         Reviewed and updated as needed this visit by Provider        {HISTORY OPTIONS (Optional):804552}    ROS:  { :081969::\"CONSTITUTIONAL: NEGATIVE for fever, chills, change in weight\",\"INTEGUMENTARY/SKIN: NEGATIVE for worrisome rashes, moles or lesions\",\"EYES: NEGATIVE for vision changes or irritation\",\"ENT: NEGATIVE for ear, mouth and throat problems\",\"RESP: NEGATIVE for significant cough or SOB\",\"CV: NEGATIVE for chest pain, palpitations or peripheral edema\",\"GI: NEGATIVE for nausea, abdominal pain, heartburn, or change in bowel habits\",\" male: negative for dysuria, hematuria, decreased urinary stream, erectile dysfunction, urethral discharge\",\"MUSCULOSKELETAL: NEGATIVE for significant arthralgias or myalgia\",\"NEURO: NEGATIVE for weakness, dizziness or paresthesias\",\"PSYCHIATRIC: NEGATIVE for changes in mood or affect\"}    OBJECTIVE:   There were no vitals taken for this visit.  EXAM:  {Exam Choices:824504}    {Diagnostic Test Results (Optional):882433::\"Diagnostic Test Results:\",\"none \"}    ASSESSMENT/PLAN:   {Diag Picklist:990474}    COUNSELING:  {MALE COUNSELING MESSAGES:306070::\"Reviewed preventive health counseling, as reflected in patient instructions\"}    BP Readings from Last 1 Encounters:   07/03/18 136/78     Estimated body mass index is 41.7 kg/(m^2) as calculated from the following:    Height as of 7/3/18: 5' 11\" (1.803 m).    Weight as of 7/3/18: 299 lb (135.6 kg).    {BP Counseling- Complete if BP >= 120/80  (Optional):138292}  {Weight Management Plan (ACO) Complete if BMI is abnormal-  Ages 18-64  BMI >24.9.  Age 65+ with BMI <23 or >30 (Optional):491853}     reports that he has never smoked. He has never used smokeless tobacco.  {Tobacco Cessation -- Complete if patient is a smoker (Optional):584470}    Counseling Resources:  ATP IV Guidelines  Pooled Cohorts Equation Calculator  FRAX Risk Assessment  ICSI Preventive Guidelines  Dietary Guidelines for Americans, 2010  USDA's " MyPlate  ASA Prophylaxis  Lung CA Screening    Pedro Brasher MD  [facility] [place]

## 2018-10-15 NOTE — MR AVS SNAPSHOT
After Visit Summary   10/15/2018    Edis Beltre    MRN: [id number]           Patient Information     Date Of Birth          1950        Visit Information        Provider Department      10/15/2018 2:00 PM Pedro Brasher MD [facility] [place]        Today's Diagnoses     Encounter for Medicare annual wellness exam    -  1    Hyperlipidemia LDL goal <100        Gastroesophageal reflux disease without esophagitis        LAURA (obstructive sleep apnea)        Morbid obesity due to excess calories (H)        Prediabetes        h/o Paroxysmal ventricular tachycardia (H) - s/p ablation         Need for prophylactic vaccination and inoculation against influenza        Medicare annual wellness visit, subsequent          Care Instructions      Preventive Health Recommendations:   Male Ages 65 and over    Yearly exam:             See your health care provider every year in order to  o   Review health changes.   o   Discuss preventive care.    o   Review your medicines if your doctor has prescribed any.    Talk with your health care provider about whether you should have a test to screen for prostate cancer (PSA).    Every 3 years, have a diabetes test (fasting glucose). If you are at risk for diabetes, you should have this test more often.    Every 5 years, have a cholesterol test. Have this test more often if you are at risk for high cholesterol or heart disease.     Every 10 years, have a colonoscopy. Or, have a yearly FIT test (stool test). These exams will check for colon cancer.    Talk to with your health care provider about screening for Abdominal Aortic Aneurysm if you have a family history of AAA or have a history of smoking.    Shots:     Get a flu shot each year.     Get a tetanus shot every 10 years.     Talk to your doctor about your pneumonia vaccines. There are now two you should receive - Pneumovax (PPSV 23) and Prevnar (PCV 13).     Talk to your pharmacist about a shingles  vaccine.     Talk to your doctor about the hepatitis B vaccine.  Nutrition:     Eat at least 5 servings of fruits and vegetables each day.     Eat whole-grain bread, whole-wheat pasta and brown rice instead of white grains and rice.     Get adequate Calcium and Vitamin D.   Lifestyle    Exercise for at least 150 minutes a week (30 minutes a day, 5 days a week). This will help you control your weight and prevent disease.     Limit alcohol to one drink per day.     No smoking.     Wear sunscreen to prevent skin cancer.     See your dentist every six months for an exam and cleaning.     See your eye doctor every 1 to 2 years to screen for conditions such as glaucoma, macular degeneration, cataracts, etc         Services Typically covered by Medicare Recommended Completed   Vaccines    Pneumonoccol    Influenza    Hepatitis B (if medium/high risk)     Once for patients after age 65    Yearly  Medium/high risk factors:    End Stage Kidney Disease    Hemophiliacs who received Factor XIII or IX concentrates    Clients of institutions for developmentally disabled    Persons who live in same house as a Hepatitis B carrier    Homosexual men    Illicit injectable drug users    Health care workers     Mammogram Covered: One-time screen between age 35-39, annually for age 40+     Pap and Pelvic Exam Covered: Annually if  high risk,  or childbearing age with abnormal Pap in last 3 years.  Q24 months for all other women     Prostate Cancer Screening    Digital rectal exam    PSA Covered: Annually for all men > age 50     Corolrectal Cancer Screening Screening colonoscopy every 10 years, more often for high risk patients     Diabetes Self-Management Training Requires referral by treating physician for patient with diabetes     Diabetes Screening    Fasting blood sugar or glucose tolerance test   Once yearly, twice yearly if prediabetic     Cardiovascular Screening Blood Tests    Total Cholesterol    HDL    Triglycerides Every 5  years     Medical Nutrition Therapy for Diabetes or Renal Disease Requires referral by treating physician for patient with diabetes or kidney disease     Glaucoma Screening Annually for patients with one of the following risk factors:    Diabetes Mellitus    Family history of Glaucoma    -American age 50 and over    -American age 65 and over     Bone Mass Measurement Every 24 months if one of the following risk factors:    Estrogen deficiency    Vertebral abnormalities on x-ray indicative of Osteoporosis, Osteopenia, or Vertebral fracture    Receiving/expected to receive the equivalent of at least 5 mg of Prednisone per day for > 3 months    Hyperparathyroidism    Patient being monitored for response to Osteoporosis Therapy     One-time AAA screen  Must be ordered as part of Medicare IPPE   Any patient with a family history of AAA    Males Age 65-75, with history of smoking at least 100 cigarettes in lifetime     Smoking Cessation Counseling Beneficiaries who use tobacco are eligible to receive 2 cessation attempts per year; each attempt includes maximum of 4 sessions     HIV Screening Annually for beneficiaries at increased risk:       Increased risk for HIV infection is defined in the  National Coverage Determinations (NCD) Manual,  Publication 100-03 Sections 190.14 (diagnostic) and 210.7 (screening). See http://www.cms.gov/manuals/downloads/mmr525p7_Eviu9.pdf and http://www.cms.gov/manuals/downloads/hvv695r3_Uses5.pdf on the Internet.  Three times per pregnancy for beneficiaries who are pregnant.     Future Annual Wellness Visit Annually, for all beneficiaries.       Preventive Health Recommendations:       Male Ages 65 and over    Yearly exam:             See your health care provider every year in order to  o   Review health changes.   o   Discuss preventive care.    o   Review your medicines if your doctor has prescribed any.    Talk with your health care provider about whether you should have  a test to screen for prostate cancer (PSA).    Every 3 years, have a diabetes test (fasting glucose). If you are at risk for diabetes, you should have this test more often.    Every 5 years, have a cholesterol test. Have this test more often if you are at risk for high cholesterol or heart disease.     Every 10 years, have a colonoscopy. Or, have a yearly FIT test (stool test). These exams will check for colon cancer.    Talk to with your health care provider about screening for Abdominal Aortic Aneurysm if you have a family history of AAA or have a history of smoking.  Shots:     Get a flu shot each year.     Get a tetanus shot every 10 years.     Talk to your doctor about your pneumonia vaccines. There are now two you should receive - Pneumovax (PPSV 23) and Prevnar (PCV 13).    Talk to your pharmacist about a shingles vaccine.     Talk to your doctor about the hepatitis B vaccine.  Nutrition:     Eat at least 5 servings of fruits and vegetables each day.     Eat whole-grain bread, whole-wheat pasta and brown rice instead of white grains and rice.     Get adequate Calcium and Vitamin D.   Lifestyle    Exercise for at least 150 minutes a week (30 minutes a day, 5 days a week). This will help you control your weight and prevent disease.     Limit alcohol to one drink per day.     No smoking.     Wear sunscreen to prevent skin cancer.     See your dentist every six months for an exam and cleaning.     See your eye doctor every 1 to 2 years to screen for conditions such as glaucoma, macular degeneration and cataracts.          Follow-ups after your visit        Who to contact     If you have questions or need follow up information about today's clinic visit or your schedule please contact [facility] directly at [phone].  Normal or non-critical lab and imaging results will be communicated to you by MyChart, letter or phone within 4 business days after the clinic has received the results. If you do  "not hear from us within 7 days, please contact the clinic through [employer] or phone. If you have a critical or abnormal lab result, we will notify you by phone as soon as possible.  Submit refill requests through [employer] or call your pharmacy and they will forward the refill request to us. Please allow 3 business days for your refill to be completed.          Additional Information About Your Visit        [employer] [employer] [facility] Information     [employer] gives you secure access to your electronic health record. If you see a primary care provider, you can also send messages to your care team and make appointments. If you have questions, please call your primary care clinic.  If you do not have a primary care provider, please call [phone] and they will assist you.        Care EveryWhere ID     This is your Care EveryWhere ID. This could be used by other organizations to access your [place] medical records  [id number]        Your Vitals Were     Pulse Temperature Height Pulse Oximetry BMI (Body Mass Index)       102 98.6  F (37  C) (Oral) 5' 11\" (1.803 m) 94% 41.84 kg/m2        Blood Pressure from Last 3 Encounters:   10/15/18 136/78   07/03/18 136/78   05/02/18 138/72    Weight from Last 3 Encounters:   10/15/18 300 lb (136.1 kg)   07/03/18 299 lb (135.6 kg)   04/21/18 291 lb 8 oz (132.2 kg)              We Performed the Following          ADMIN VACCINE, FIRST [[id number]]     HC FLU VAC PRESRV FREE QUAD SPLIT VIR 3+YRS IM  [[id number]]        Primary Care Provider Office Phone # Fax #    Pedro Brasher [phone] [address] [facility] [address]        Equal Access to Services     [facility] : Hadii tyree Jones, jamarcusda estella, qaybta edgardo bloom. So [facility] [phone].    ATENCIÓN: Si habla español, tiene a medel disposición servicios gratuitos de asistencia lingüística. Llame al [phone].    We comply with applicable federal civil " rights laws and [place] laws. We do not discriminate on the basis of race, color, national origin, age, disability, sex, sexual orientation, or gender identity.            Thank you!     Thank you for choosing [facility]  for your care. Our goal is always to provide you with excellent care. Hearing back from our patients is one way we can continue to improve our services. Please take a few minutes to complete the written survey that you may receive in the mail after your visit with us. Thank you!             Your Updated Medication List - Protect others around you: Learn how to safely use, store and throw away your medicines at www.disposemymeds.org.          This list is accurate as of 10/15/18  2:25 PM.  Always use your most recent med list.                   Brand Name Dispense Instructions for use Diagnosis    ACIDOPHILUS PROBIOTIC BLEND Caps      Take 1 capsule by mouth daily    Diarrhea, unspecified type       aspirin 81 MG tablet     30 tablet    Take 81 mg by mouth daily        Daily Multivitamin Tabs      Take 1 tablet by mouth daily        fish oil-omega-3 fatty acids 1000 MG capsule      Take 2 g by mouth daily

## 2018-12-04 ENCOUNTER — OFFICE VISIT (OUTPATIENT)
Dept: FAMILY MEDICINE | Facility: CLINIC | Age: 68
End: 2018-12-04
Payer: COMMERCIAL

## 2018-12-04 VITALS
HEIGHT: 71 IN | WEIGHT: 300 LBS | OXYGEN SATURATION: 96 % | TEMPERATURE: 99 F | BODY MASS INDEX: 42 KG/M2 | DIASTOLIC BLOOD PRESSURE: 78 MMHG | SYSTOLIC BLOOD PRESSURE: 136 MMHG | HEART RATE: 81 BPM

## 2018-12-04 DIAGNOSIS — K21.9 GASTROESOPHAGEAL REFLUX DISEASE WITHOUT ESOPHAGITIS: ICD-10-CM

## 2018-12-04 PROCEDURE — 99214 OFFICE O/P EST MOD 30 MIN: CPT | Performed by: FAMILY MEDICINE

## 2018-12-04 RX ORDER — MAGNESIUM HYDROXIDE/ALUMINUM HYDROXICE/SIMETHICONE 120; 1200; 1200 MG/30ML; MG/30ML; MG/30ML
30 SUSPENSION ORAL EVERY 4 HOURS PRN
Refills: 0 | COMMUNITY
Start: 2018-12-04

## 2018-12-04 NOTE — PROGRESS NOTES
"  SUBJECTIVE:                                                      Edis Beltre is a 68 year old male who presents to clinic today for the following health issues:    GERD/Heartburn  Onset: 3 weeks chronic GERD    Description:     Burning in chest: no- gassy, burping (last night)     Intensity: moderate, severe    Progression of Symptoms: worsening    Accompanying Signs & Symptoms:- pt is very tired no energy  Does it feel like food gets stuck: no  Nausea: no  Vomiting (bloody?): no  Abdominal Pain: no  Loose stool: YES   Black-Tarry stools: no  Bloody stools: no  Fever: no  Cough: no    History:    Acid reflux: YES  Previous ulcers: no    Precipitating factors:   Caffeine use: no  Alcohol use: no  NSAID/Aspirin use: no  Tobacco use: no  Worse with no particular food or drink.    Alleviating factors:  Omeprazole (takes before meals) and probiotic      Problem list and histories reviewed & adjusted, as indicated.  Additional history: as documented    ROS:  Constitutional, HEENT, cardiovascular, pulmonary, GI, , musculoskeletal, neuro, skin, endocrine and psych systems are negative, except as otherwise noted.    This document serves as a record of the services and decisions personally performed and made by Pedro Brasher MD. It was created on his behalf by Ramiro Avila, a trained medical scribe. The creation of this document is based the provider's statements to the medical scribe.  Scribe Ramiro Avila 11:57 AM, December 4, 2018    OBJECTIVE:                                                    /78  Pulse 81  Temp 99  F (37.2  C) (Oral)  Ht 1.803 m (5' 11\")  Wt 136.1 kg (300 lb)  SpO2 96%  BMI 41.84 kg/m2 Body mass index is 41.84 kg/(m^2).   GENERAL: healthy, alert, well nourished, well hydrated, no distress  HENT: ear canals- normal; TMs- normal; Nose- normal; Mouth- no ulcers, no lesions  NECK: no tenderness, no adenopathy, no asymmetry, no masses, no stiffness; thyroid- normal to palpation  RESP: lungs clear to " "auscultation - no rales, no rhonchi, no wheezes  CV: regular rates and rhythm, normal S1 S2, no S3 or S4 and no murmur, no click or rub -  ABDOMEN: soft, no tenderness, no  hepatosplenomegaly, no masses, normal bowel sounds    ASSESSMENT/PLAN:                                                    Edis was seen today for abdominal pain.    Diagnoses and all orders for this visit:    Gastroesophageal reflux disease without esophagitis  -     omeprazole (PRILOSEC) 20 MG DR capsule; Take 1 capsule (20 mg) by mouth daily  -     alum & mag hydroxide-simethicone (MAALOX REGULAR STRENGTH) 200-200-20 MG/5ML SUSP suspension; Take 30 mLs by mouth every 4 hours as needed for indigestion        Risks, benefits and alternatives of treatments discussed. Plan agreed on.      Followup: Data Unavailable    See patient instructions.       BMI:   Estimated body mass index is 41.84 kg/(m^2) as calculated from the following:    Height as of this encounter: 1.803 m (5' 11\").    Weight as of this encounter: 136.1 kg (300 lb).   Weight management plan: Discussed healthy diet and exercise guidelines    The information in this document, created by the medical scribe for me, accurately reflects the services I personally performed and the decisions made by me. I have reviewed and approved this document for accuracy prior to leaving the patient care area.  12:04 PM, 12/04/18        Chalino Brasher MD   Pager: [phone]    "

## 2018-12-04 NOTE — MR AVS SNAPSHOT
"              After Visit Summary   12/4/2018    Edis Beltre    MRN: [id number]           Patient Information     Date Of Birth          1950        Visit Information        Provider Department      12/4/2018 11:40 AM Pedro Brasher MD [facility]        Today's Diagnoses     Gastroesophageal reflux disease without esophagitis           Follow-ups after your visit        Who to contact     If you have questions or need follow up information about today's clinic visit or your schedule please contact [facility] directly at [phone].  Normal or non-critical lab and imaging results will be communicated to you by MyChart, letter or phone within 4 business days after the clinic has received the results. If you do not hear from us within 7 days, please contact the clinic through [employer] or phone. If you have a critical or abnormal lab result, we will notify you by phone as soon as possible.  Submit refill requests through [employer] or call your pharmacy and they will forward the refill request to us. Please allow 3 business days for your refill to be completed.          Additional Information About Your Visit        MyChart Information     [employer] gives you secure access to your electronic health record. If you see a primary care provider, you can also send messages to your care team and make appointments. If you have questions, please call your primary care clinic.  If you do not have a primary care provider, please call [phone] and they will assist you.        Care EveryWhere ID     This is your Care EveryWhere ID. This could be used by other organizations to access your [place] medical records  [id number]        Your Vitals Were     Pulse Temperature Height Pulse Oximetry BMI (Body Mass Index)       81 99  F (37.2  C) (Oral) 5' 11\" (1.803 m) 96% 41.84 kg/m2        Blood Pressure from Last 3 Encounters:   12/04/18 136/78   10/15/18 136/78   07/03/18 136/78    Weight " from Last 3 Encounters:   12/04/18 300 lb (136.1 kg)   10/15/18 300 lb (136.1 kg)   07/03/18 299 lb (135.6 kg)              Today, you had the following     No orders found for display         Today's Medication Changes          These changes are accurate as of 12/4/18 12:02 PM.  If you have any questions, ask your nurse or doctor.               Start taking these medicines.        Dose/Directions    alum & mag hydroxide-simethicone 200-200-20 MG/5ML Susp suspension   Commonly known as:  MAALOX REGULAR STRENGTH   Used for:  Gastroesophageal reflux disease without esophagitis   Started by:  Pedro Brasher MD        Dose:  30 mL   Take 30 mLs by mouth every 4 hours as needed for indigestion   Refills:  0            Where to get your medicines      These medications were sent to [facility] [address] [place] [address] [facility]  [address] [facility] [address]     Phone:  [phone]     omeprazole 20 MG DR capsule         Some of these will need a paper prescription and others can be bought over the counter.  Ask your nurse if you have questions.     You don't need a prescription for these medications     alum & mag hydroxide-simethicone 200-200-20 MG/5ML Susp suspension                Primary Care Provider Office Phone # Fax #    Pedro Brasher [phone] [address] [facility] [address]        Equal Access to Services     [facility] AH: Hadii aad ku hadasho Soomaali, waaxda luqadaha, qaybta kaalmada adeegyada, edgardo sinha haybal sosa . So [facility] [phone].    ATENCIÓN: Si habla español, tiene a medel disposición servicios gratuitos de asistencia lingüística. Llame al [phone].    We comply with applicable federal civil rights laws and [place] laws. We do not discriminate on the basis of race, color, national origin, age, disability, sex, sexual orientation, or gender identity.            Thank you!     Thank you for choosing [facility]  [place]  for your care. Our goal is always to provide you with excellent care. Hearing back from our patients is one way we can continue to improve our services. Please take a few minutes to complete the written survey that you may receive in the mail after your visit with us. Thank you!             Your Updated Medication List - Protect others around you: Learn how to safely use, store and throw away your medicines at www.disposemymeds.org.          This list is accurate as of 12/4/18 12:02 PM.  Always use your most recent med list.                   Brand Name Dispense Instructions for use Diagnosis    ACIDOPHILUS PROBIOTIC BLEND Caps      Take 1 capsule by mouth daily    Diarrhea, unspecified type       alum & mag hydroxide-simethicone 200-200-20 MG/5ML Susp suspension    MAALOX REGULAR STRENGTH     Take 30 mLs by mouth every 4 hours as needed for indigestion    Gastroesophageal reflux disease without esophagitis       aspirin 81 MG tablet    ASA    30 tablet    Take 81 mg by mouth daily        Daily Multivitamin Tabs      Take 1 tablet by mouth daily        fish oil-omega-3 fatty acids 1000 MG capsule      Take 2 g by mouth daily        omeprazole 20 MG DR capsule    priLOSEC    90 capsule    Take 1 capsule (20 mg) by mouth daily    Gastroesophageal reflux disease without esophagitis

## 2019-01-09 ENCOUNTER — APPOINTMENT (OUTPATIENT)
Age: 69
Setting detail: DERMATOLOGY
End: 2019-01-09

## 2019-01-09 DIAGNOSIS — L82.0 INFLAMED SEBORRHEIC KERATOSIS: ICD-10-CM

## 2019-01-09 DIAGNOSIS — L57.0 ACTINIC KERATOSIS: ICD-10-CM

## 2019-01-09 PROCEDURE — 17000 DESTRUCT PREMALG LESION: CPT | Mod: 59

## 2019-01-09 PROCEDURE — OTHER LIQUID NITROGEN: OTHER

## 2019-01-09 PROCEDURE — OTHER COUNSELING: OTHER

## 2019-01-09 PROCEDURE — 17003 DESTRUCT PREMALG LES 2-14: CPT

## 2019-01-09 PROCEDURE — OTHER MIPS QUALITY: OTHER

## 2019-01-09 PROCEDURE — 17110 DESTRUCT B9 LESION 1-14: CPT

## 2019-01-09 ASSESSMENT — LOCATION SIMPLE DESCRIPTION DERM
LOCATION SIMPLE: LEFT FOREHEAD
LOCATION SIMPLE: SCALP
LOCATION SIMPLE: LEFT SCALP
LOCATION SIMPLE: LEFT EYEBROW
LOCATION SIMPLE: RIGHT FOREHEAD

## 2019-01-09 ASSESSMENT — LOCATION DETAILED DESCRIPTION DERM
LOCATION DETAILED: LEFT LATERAL EYEBROW
LOCATION DETAILED: LEFT CENTRAL FRONTAL SCALP
LOCATION DETAILED: LEFT SUPERIOR PARIETAL SCALP
LOCATION DETAILED: LEFT CENTRAL PARIETAL SCALP
LOCATION DETAILED: LEFT INFERIOR FOREHEAD
LOCATION DETAILED: RIGHT INFERIOR MEDIAL FOREHEAD

## 2019-01-09 ASSESSMENT — LOCATION ZONE DERM
LOCATION ZONE: SCALP
LOCATION ZONE: FACE

## 2019-01-09 NOTE — PROCEDURE: MIPS QUALITY
Detail Level: Detailed
Quality 431: Preventive Care And Screening: Unhealthy Alcohol Use - Screening: Patient screened for unhealthy alcohol use using a single question and scores less than 2 times per year
Quality 226: Preventive Care And Screening: Tobacco Use: Screening And Cessation Intervention: Patient screened for tobacco and never smoked
Quality 110: Preventive Care And Screening: Influenza Immunization: Influenza Immunization previously received during influenza season
Quality 130: Documentation Of Current Medications In The Medical Record: Current Medications Documented
Quality 131: Pain Assessment And Follow-Up: Pain assessment using a standardized tool is documented as negative, no follow-up plan required

## 2019-01-09 NOTE — PROCEDURE: LIQUID NITROGEN
Include Z78.9 (Other Specified Conditions Influencing Health Status) As An Associated Diagnosis?: No
Render Post Care In The Note?: yes
Number Of Freeze-Thaw Cycles: 1 freeze-thaw cycle
Total Number Of Aks Treated: 3
Duration Of Freeze Thaw-Cycle (Seconds): 15
Medical Necessity Clause: This procedure was medically necessary because the lesions that were treated were:
Detail Level: Simple
Total Number Of Lesions Treated: 6
Detail Level: Detailed
Consent: The patient's consent was obtained including but not limited to risks of crusting, scabbing, blistering, scarring, darker or lighter pigmentary change, recurrence, incomplete removal and infection.
Consent: The patient's verbal consent was obtained including but not limited to risks of crusting, scabbing, blistering, scarring, darker or lighter pigmentary change, recurrence, incomplete removal and infection.
Post-Care Instructions: I reviewed with the patient in detail post-care instructions. (Written instructions given) Patient is to wear sun protection, and avoid picking at any of the treated lesions. Pt may apply Vaseline to crusted or scabbing areas.
Post-Care Instructions: I reviewed with the patient in detail post-care instructions. Patient is to wear sunprotection, and avoid picking at any of the treated lesions. Pt may apply Vaseline to crusted or scabbing areas.
Medical Necessity Information: It is in your best interest to select a reason for this procedure from the list below. All of these items fulfill various CMS LCD requirements except the new and changing color options.
Duration Of Freeze Thaw-Cycle (Seconds): 10

## 2019-02-01 ENCOUNTER — TELEPHONE (OUTPATIENT)
Dept: FAMILY MEDICINE | Facility: CLINIC | Age: 69
End: 2019-02-01

## 2019-02-01 NOTE — TELEPHONE ENCOUNTER
Called patient @ # below    Patient wondering if he can come in and get the bomb (GI cocktail) - has a hx of GERD. Patient had a session 3 days ago. Is having a lot of gas - belching and feeling crappy. Had a large BM this morning - all diarrhea. No appetits    Patient has taken an equate in addition to his GERD meds (doubled up on these since onset - 40mg omeprazole) - is helping but needs more to help get it settled down.     Patient stated his nerves/stress has increased - is in the process of moving.     GERD/Heartburn  Onset: 01/30/2019    Description:     Burning in chest: YES- slightly    Intensity: moderate    Progression of Symptoms: worsening and constant    Accompanying Signs & Symptoms:  Does it feel like food gets stuck: no  Nausea: no  Vomiting (bloody?): no  Abdominal Pain: no  Black-Tarry stools: no:  Bloody stools: no    History:   Previous ulcers: no    Precipitating factors:   Caffeine use: no  Alcohol use: no  NSAID/Aspirin use: no  Tobacco use: no  Worse with no particular food or drink.    Alleviating factors:  40mg omeprazole & Equate - is helping    Therapies Tried and outcome:see above     DENIES: CP, SOB, Difficulty Breathing, Dizziness/Lightheadedness, N/V    Patient requesting GI cocktail prescribed with additional refills for when he does move.     Pharmacy: [facility] - [place], [place]      Routing to PCP for further review/recommendations/orders.    Pricila Madrid RN  [facility]

## 2019-02-01 NOTE — TELEPHONE ENCOUNTER
Reason for call:  Patient reporting a symptom    Symptom or request: The patient is calling saying he is having ongoing stomach issues including upset stomach, gas and bloating. He is wondering what Dr. Brasher recommends. He is even open to seeing a GI doctor for this.    Duration (how long have symptoms been present): Three days    Have you been treated for this before? Yes    Phone Number patient can be reached at:  Cell number on file:    Telephone Information:   Mobile [phone]     Best Time:  Anytime    Can we leave a detailed message on this number:  YES    Call taken on 2/1/2019 at 8:02 AM by Catrina Foster

## 2019-02-12 NOTE — TELEPHONE ENCOUNTER
I do not prescribe GI cocktails - he is welcome to get OTC maalox which is one component of this. Perhaps a different PPI may help (I agree with omperazole 40 mg daily).  Are his symptoms related to certain foods or beverages?

## 2019-02-13 NOTE — TELEPHONE ENCOUNTER
"Called patient @ # below -     Advised of MD SÁNCHEZ message below -   Patient stated that his symptoms are resolved now. Patient then stated that this is ridiculous - it has been 2 weeks and patient is extremely disappointed in this clinic.   RN attempted to apologize to patient but patient cut off RN and stated \"nevermind\" then hung up.     Routing to PCP as an ZORANI    Pricila Madrid RN  [place] [facility]  "

## 2019-02-18 ENCOUNTER — OFFICE VISIT (OUTPATIENT)
Dept: FAMILY MEDICINE | Facility: CLINIC | Age: 69
End: 2019-02-18
Payer: MEDICARE

## 2019-02-18 VITALS
OXYGEN SATURATION: 94 % | HEIGHT: 71 IN | DIASTOLIC BLOOD PRESSURE: 80 MMHG | TEMPERATURE: 98.7 F | HEART RATE: 78 BPM | WEIGHT: 297 LBS | SYSTOLIC BLOOD PRESSURE: 134 MMHG | BODY MASS INDEX: 41.58 KG/M2

## 2019-02-18 DIAGNOSIS — Z51.81 MEDICATION MONITORING ENCOUNTER: ICD-10-CM

## 2019-02-18 DIAGNOSIS — K21.9 GASTROESOPHAGEAL REFLUX DISEASE WITHOUT ESOPHAGITIS: ICD-10-CM

## 2019-02-18 DIAGNOSIS — R19.7 DIARRHEA, UNSPECIFIED TYPE: Primary | ICD-10-CM

## 2019-02-18 DIAGNOSIS — R73.03 PREDIABETES: ICD-10-CM

## 2019-02-18 DIAGNOSIS — G47.33 OSA (OBSTRUCTIVE SLEEP APNEA): ICD-10-CM

## 2019-02-18 DIAGNOSIS — E66.01 MORBID OBESITY DUE TO EXCESS CALORIES (H): ICD-10-CM

## 2019-02-18 LAB
BASOPHILS # BLD AUTO: 0 10E9/L (ref 0–0.2)
BASOPHILS NFR BLD AUTO: 0.4 %
CRP SERPL-MCNC: <2.9 MG/L (ref 0–8)
DIFFERENTIAL METHOD BLD: NORMAL
EOSINOPHIL # BLD AUTO: 0.1 10E9/L (ref 0–0.7)
EOSINOPHIL NFR BLD AUTO: 2.5 %
ERYTHROCYTE [DISTWIDTH] IN BLOOD BY AUTOMATED COUNT: 12.9 % (ref 10–15)
ERYTHROCYTE [SEDIMENTATION RATE] IN BLOOD BY WESTERGREN METHOD: 30 MM/H (ref 0–20)
HCT VFR BLD AUTO: 49.9 % (ref 40–53)
HGB BLD-MCNC: 17.3 G/DL (ref 13.3–17.7)
LYMPHOCYTES # BLD AUTO: 1 10E9/L (ref 0.8–5.3)
LYMPHOCYTES NFR BLD AUTO: 16.9 %
MCH RBC QN AUTO: 32.9 PG (ref 26.5–33)
MCHC RBC AUTO-ENTMCNC: 34.7 G/DL (ref 31.5–36.5)
MCV RBC AUTO: 95 FL (ref 78–100)
MONOCYTES # BLD AUTO: 0.8 10E9/L (ref 0–1.3)
MONOCYTES NFR BLD AUTO: 13.3 %
NEUTROPHILS # BLD AUTO: 3.8 10E9/L (ref 1.6–8.3)
NEUTROPHILS NFR BLD AUTO: 66.9 %
PLATELET # BLD AUTO: 272 10E9/L (ref 150–450)
RBC # BLD AUTO: 5.26 10E12/L (ref 4.4–5.9)
WBC # BLD AUTO: 5.6 10E9/L (ref 4–11)

## 2019-02-18 PROCEDURE — 86140 C-REACTIVE PROTEIN: CPT | Performed by: FAMILY MEDICINE

## 2019-02-18 PROCEDURE — 36415 COLL VENOUS BLD VENIPUNCTURE: CPT | Performed by: FAMILY MEDICINE

## 2019-02-18 PROCEDURE — 85652 RBC SED RATE AUTOMATED: CPT | Performed by: FAMILY MEDICINE

## 2019-02-18 PROCEDURE — 99214 OFFICE O/P EST MOD 30 MIN: CPT | Performed by: FAMILY MEDICINE

## 2019-02-18 PROCEDURE — 80053 COMPREHEN METABOLIC PANEL: CPT | Performed by: FAMILY MEDICINE

## 2019-02-18 PROCEDURE — 85025 COMPLETE CBC W/AUTO DIFF WBC: CPT | Performed by: FAMILY MEDICINE

## 2019-02-18 ASSESSMENT — MIFFLIN-ST. JEOR: SCORE: 2129.31

## 2019-02-18 NOTE — PROGRESS NOTES
SUBJECTIVE:   Edis Beltre is a 68 year old male who presents to clinic today for the following health issues:    Diarrhea      Duration: 6 weeks on and off     Description:       Consistency of stool: loose       Blood in stool: no        Number of loose stools past 24 hours: once this AM     Intensity:  moderate    Accompanying signs and symptoms:  Lots of gas and bloating        Fever: no        Nausea/vomitting: YES- Nausea        Abdominal pain: no        Weight loss: YES- 3-5 lbs     History (recent antibiotics or travel/ill contacts/med changes/testing done): Was triaged with similar symptoms on 2/1/19. Patient states that he has a history of these symptoms for awhile. Has been under a lot of stress due to moving to [place].     Precipitating or alleviating factors: None    Therapies tried and outcome: omeprazole, probiotic, Maalox slightly effective     Patient reports watery brown stools 1-2 times per day. Patient reports that when he gets the urge he has to run to the toilet. Patient reports that his only travel was to [place] and he reports his diarrhea was better in [place]. Patient reports he has been burping and belching but has had no other GERD symptoms. No recent antibiotics.    Patient reports he will be moving [place] [facility] soon and this will be his last Clinic visit.    Problem list and histories reviewed & adjusted, as indicated.  Additional history: as documented    BP Readings from Last 3 Encounters:   02/18/19 134/80   12/04/18 136/78   10/15/18 136/78       body mass index is 41.42 kg/m .    Wt Readings from Last 4 Encounters:   02/18/19 134.7 kg (297 lb)   12/04/18 136.1 kg (300 lb)   10/15/18 136.1 kg (300 lb)   07/03/18 135.6 kg (299 lb)       Health Maintenance    Health Maintenance Due   Topic Date Due     ZOSTER IMMUNIZATION (1 of 2) 05/24/1998     ADVANCE DIRECTIVE PLANNING Q5 YRS  01/17/2019       Current Problem List    Patient Active Problem List   Diagnosis      Gastroesophageal reflux disease without esophagitis     LAURA (obstructive sleep apnea)     FAMILY HX GI MALIGNANCY     Hyperlipidemia LDL goal <100     Morbid obesity due to excess calories (H)     Prediabetes     Advanced directives, counseling/discussion     S/P total knee arthroplasty     Premature ventricular contraction     Postprocedural pneumothorax - 2014     Metatarsalgia of right foot     Jaw pain     Palpitations     h/o Paroxysmal ventricular tachycardia (H) - s/p ablation        Past Medical History    Past Medical History:   Diagnosis Date     Arthritis      Calcaneal spur      Esophageal reflux      FAMILY HX GI MALIGNANCY 10/1/2007     Gastro-oesophageal reflux disease      NSVT (nonsustained ventricular tachycardia) (H)     on holter 2/2016     Other chronic pain     Joint pain for 30 years.     Personal history of colonic polyps     adenoma - colonoscopy due 2012     Pneumothorax     recurrent, surgically treated     Premature ventricular contraction     ablation 2/26/2016     Pure hypercholesterolemia      Sleep apnea     CPAP       Past Surgical History    Past Surgical History:   Procedure Laterality Date     ANKLE SURGERY      left     ARTHROPLASTY KNEE  4/14/2014    Procedure: Left Total Knee Arthroplasty ;  Surgeon: Gallito Willis MD;  Location: [facility] OR     BIOPSY      Skin doctor     CHOLECYSTECTOMY, LAPOROSCOPIC  2007     COLONOSCOPY       EYE SURGERY  2013    Cataract surgery on both eyes     H ABLATION PVC  2/26/16     STRIP VEIN      left leg     SURGICAL HISTORY OF -       L VEIN STRIPPING     SURGICAL HISTORY OF -       L MENISCUS- ARTHROSCOPY     THORACIC SURGERY  5/2014    VATS , wedge resction and mechanical pleurodesis -[place], [place]       Current Medications    Current Outpatient Medications   Medication Sig Dispense Refill     alum & mag hydroxide-simethicone (MAALOX REGULAR STRENGTH) 200-200-20 MG/5ML SUSP suspension Take 30 mLs by mouth every 4 hours as  needed for indigestion  0     aspirin 81 MG tablet Take 81 mg by mouth daily  30 tablet      DAILY MULTIVITAMIN OR TABS Take 1 tablet by mouth daily       fish oil-omega-3 fatty acids 1000 MG capsule Take 2 g by mouth daily       omeprazole (PRILOSEC) 20 MG DR capsule Take 1 capsule (20 mg) by mouth daily 90 capsule 3     Probiotic Product (ACIDOPHILUS PROBIOTIC BLEND) CAPS Take 1 capsule by mouth daily         Allergies    Allergies   Allergen Reactions     Atorvastatin      Other reaction(s): Muscle Aches/Weakness       Immunizations    Immunization History   Administered Date(s) Administered     Influenza (High Dose) 3 valent vaccine 11/07/2014, 12/31/2015, 11/01/2016, 10/17/2017, 10/15/2018     Pneumo Conj 13-V (2010&after) 12/31/2015     Pneumococcal 23 valent 05/05/2014     TD (ADULT, 7+) 02/10/1998, 04/19/2011       Family History    Family History   Problem Relation Age of Onset     Colon Cancer Father      Chronic Obstructive Pulmonary Disease Father      Heart Disease Mother         Has a pacemaker     Pacemaker Mother      Other - See Comments Brother         Lyme DZ - infected his heart and brain     No Known Problems Son      Genetic Disorder Paternal Grandmother      Diabetes No family hx of      Hypertension No family hx of      Prostate Cancer No family hx of      Coronary Artery Disease No family hx of        Social History    Social History     Socioeconomic History     Marital status:      Spouse name: An     Number of children: 1     Years of education: Not on file     Highest education level: Not on file   Occupational History     Occupation: retired teacher     Employer: [employer]   Social Needs     Financial resource strain: Not on file     Food insecurity:     Worry: Not on file     Inability: Not on file     Transportation needs:     Medical: Not on file     Non-medical: Not on file   Tobacco Use     Smoking status: Never Smoker     Smokeless tobacco: Never Used  "  Substance and Sexual Activity     Alcohol use: Yes     Comment: Minimal     Drug use: No     Sexual activity: Not Currently     Partners: Female     Birth control/protection: None   Lifestyle     Physical activity:     Days per week: Not on file     Minutes per session: Not on file     Stress: Not on file   Relationships     Social connections:     Talks on phone: Not on file     Gets together: Not on file     Attends Anabaptist service: Not on file     Active member of club or organization: Not on file     Attends meetings of clubs or organizations: Not on file     Relationship status: Not on file     Intimate partner violence:     Fear of current or ex partner: Not on file     Emotionally abused: Not on file     Physically abused: Not on file     Forced sexual activity: Not on file   Other Topics Concern      Service No     Blood Transfusions Not Asked     Caffeine Concern No     Occupational Exposure Not Asked     Hobby Hazards Not Asked     Sleep Concern Yes     Comment: LAURA- on CPAP     Stress Concern Not Asked     Weight Concern Not Asked     Special Diet Not Asked     Back Care Not Asked     Exercise Yes     Comment: some swimming     Bike Helmet Not Asked     Seat Belt Yes     Self-Exams Not Asked     Parent/sibling w/ CABG, MI or angioplasty before 65F 55M? No   Social History Narrative     Not on file       All above reviewed and updated, all stable unless otherwise noted    Recent labs reviewed    ROS:  Constitutional, HEENT, cardiovascular, pulmonary, GI, , musculoskeletal, neuro, skin, endocrine and psych systems are negative, except as otherwise noted.    OBJECTIVE:                                                    /80 (BP Location: Right arm, Cuff Size: Adult Large)   Pulse 78   Temp 98.7  F (37.1  C) (Oral)   Ht 1.803 m (5' 11\")   Wt 134.7 kg (297 lb)   SpO2 94%   BMI 41.42 kg/m    Body mass index is 41.42 kg/m .  GENERAL: healthy, alert and no distress  EYES: Eyes grossly " normal to inspection  HENT:ear canals and TM's normal upon viewing with otoscope, nose and mouth without ulcers or lesions upon viewing with otoscope, minimal bilateral cerumen impaction  NECK: no tenderness, no adenopathy, no asymmetry, no masses, no stiffness; thyroid- normal to palpation  RESP: lungs clear to auscultation - no rales, no rhonchi, no wheezes  CV: regular rates and rhythm, normal S1 S2, no S3 or S4 and no murmur, no click or rub -  ABDOMEN: soft, no tenderness, no  hepatosplenomegaly, no masses, normal bowel sounds  MS: extremities- no gross deformities noted, no edema  SKIN: no suspicious lesions, no rashes  NEURO: strength and tone- normal, sensory exam- grossly normal, mentation- intact, speech- normal  BACK: no CVA tenderness, no paralumbar tenderness  PSYCH: Alert and oriented times 3; speech- coherent , normal rate and volume; able to articulate logical thoughts, able to abstract reason, no tangential thoughts, no hallucinations or delusions, affect- normal    DIAGNOSTICS/PROCEDURES:                                                      No results found for this or any previous visit (from the past 24 hour(s)).     ASSESSMENT/PLAN:                                                        ICD-10-CM    1. Diarrhea, unspecified type R19.7 Comprehensive metabolic panel     CBC with platelets and differential     Erythrocyte sedimentation rate auto     CRP inflammation     Clostridium difficile Toxin B PCR     H Pylori antigen, stool     Enteric Bacteria and Virus Panel by RUI Stool     Giardia antigen     Ova and Parasite Exam Routine     Ova and Parasite Exam Routine     Fecal Lactoferrin   2. Prediabetes R73.03 Comprehensive metabolic panel   3. LAURA (obstructive sleep apnea) G47.33    4. Gastroesophageal reflux disease without esophagitis K21.9 CBC with platelets and differential   5. Morbid obesity due to excess calories (H) E66.01    6. Medication monitoring encounter Z51.81      Discussed  treatment/modality options, including risk and benefits, he desires advised aspirin 81 mg po daily, advised 1 multivitamin per day, advised dentist every 6 months, advised diet and exercise and advised opthalmologist every 1-2 years. All diagnosis above reviewed and noted above, otherwise stable.  See [facility] orders for further details.  Follow up as needed.    1) Patient presented today with diarrhea. Labs and stool cultures ordered today. Recommend patient discontinue all OTC products containing magnesium. Patient advised to continue Gaviscon use. Recommend bland diet. Recommend increase fiber intake.     2) Follow up if symptoms persist or worsen.    Health Maintenance Due   Topic Date Due     ZOSTER IMMUNIZATION (1 of 2) 05/24/1998     ADVANCE DIRECTIVE PLANNING Q5 YRS  01/17/2019     This document serves as a record of the services and decisions personally performed and made by Nato Ceballos MD. It was created on his behalf by Stephen Xiong, a trained medical scribe. The creation of this document is based on the provider's statements to the medical scribe.  Stephen Xiong February 18, 2019 11:14 AM     The information in this document, created by the medical scribe for me, accurately reflects the services I personally performed and the decisions made by me. I have reviewed and approved this document for accuracy prior to leaving the patient care area.  February 18, 2019            Nato Ceballos MD [address]  [id number] [phone] Fax

## 2019-02-18 NOTE — PATIENT INSTRUCTIONS
Recommend discontinue OTC products that contain magnesium.     [facility]                        To reach your care team during and after hours:   [phone]  To reach our pharmacy:        [phone]    Clinic Hours                        Our clinic hours are:    Monday   7:30 am to 7:00 pm                  Tuesday through Friday 7:30 am to 5:00 pm                             Saturday   8:00 am to 12:00 pm      Sunday   Closed      Pharmacy Hours                        Our pharmacy hours are:    Monday   8:30 am to 7:00 pm       Tuesday to Friday  8:30 am to 6:00 pm                       Saturday    9:00 am to 1:00 pm              Sunday    Closed              There is also information available at our web site:  [email or web address]    If your provider ordered any lab tests and you do not receive the results within 10 business days, please call the clinic.    If you need a medication refill please contact your pharmacy.  Please allow 2-3 business days for your refill to be completed.    Our clinic offers telephone visits and e visits.  Please ask one of your team members to explain more.      Use SnackFeed (secure email communication and access to your chart) to send your primary care provider a message or make an appointment. Ask someone on your Team how to sign up for SnackFeed.  Immunizations                      Immunization History   Administered Date(s) Administered     Influenza (High Dose) 3 valent vaccine 11/07/2014, 12/31/2015, 11/01/2016, 10/17/2017, 10/15/2018     Pneumo Conj 13-V (2010&after) 12/31/2015     Pneumococcal 23 valent 05/05/2014     TD (ADULT, 7+) 02/10/1998, 04/19/2011        Health Maintenance                         Health Maintenance Due   Topic Date Due     Zoster (Shingles) Vaccine (1 of 2) 05/24/1998     Discuss Advance Directive Planning  01/17/2019

## 2019-02-19 DIAGNOSIS — R19.7 DIARRHEA, UNSPECIFIED TYPE: ICD-10-CM

## 2019-02-19 LAB
ALBUMIN SERPL-MCNC: 4.1 G/DL (ref 3.4–5)
ALP SERPL-CCNC: 56 U/L (ref 40–150)
ALT SERPL W P-5'-P-CCNC: 20 U/L (ref 0–70)
ANION GAP SERPL CALCULATED.3IONS-SCNC: 8 MMOL/L (ref 3–14)
AST SERPL W P-5'-P-CCNC: 33 U/L (ref 0–45)
BILIRUB SERPL-MCNC: 0.6 MG/DL (ref 0.2–1.3)
BUN SERPL-MCNC: 16 MG/DL (ref 7–30)
C DIFF TOX B STL QL: NEGATIVE
CALCIUM SERPL-MCNC: 9.2 MG/DL (ref 8.5–10.1)
CHLORIDE SERPL-SCNC: 108 MMOL/L (ref 94–109)
CO2 SERPL-SCNC: 24 MMOL/L (ref 20–32)
CREAT SERPL-MCNC: 1.13 MG/DL (ref 0.66–1.25)
GFR SERPL CREATININE-BSD FRML MDRD: 65 ML/MIN/{1.73_M2}
GLUCOSE SERPL-MCNC: 102 MG/DL (ref 70–99)
LACTOFERRIN STL QL IA: NEGATIVE
POTASSIUM SERPL-SCNC: 4.7 MMOL/L (ref 3.4–5.3)
PROT SERPL-MCNC: 7.6 G/DL (ref 6.8–8.8)
SODIUM SERPL-SCNC: 140 MMOL/L (ref 133–144)
SPECIMEN SOURCE: NORMAL

## 2019-02-19 PROCEDURE — 87338 HPYLORI STOOL AG IA: CPT | Performed by: FAMILY MEDICINE

## 2019-02-19 PROCEDURE — 83630 LACTOFERRIN FECAL (QUAL): CPT | Performed by: FAMILY MEDICINE

## 2019-02-19 PROCEDURE — 87329 GIARDIA AG IA: CPT | Mod: 59 | Performed by: FAMILY MEDICINE

## 2019-02-19 PROCEDURE — 87177 OVA AND PARASITES SMEARS: CPT | Performed by: FAMILY MEDICINE

## 2019-02-19 PROCEDURE — 87493 C DIFF AMPLIFIED PROBE: CPT | Mod: 59 | Performed by: FAMILY MEDICINE

## 2019-02-19 PROCEDURE — 87506 IADNA-DNA/RNA PROBE TQ 6-11: CPT | Performed by: FAMILY MEDICINE

## 2019-02-19 PROCEDURE — 87209 SMEAR COMPLEX STAIN: CPT | Performed by: FAMILY MEDICINE

## 2019-02-20 LAB
C COLI+JEJUNI+LARI FUSA STL QL NAA+PROBE: NOT DETECTED
EC STX1 GENE STL QL NAA+PROBE: NOT DETECTED
EC STX2 GENE STL QL NAA+PROBE: NOT DETECTED
ENTERIC PATHOGEN COMMENT: NORMAL
G LAMBLIA AG STL QL IA: NORMAL
H PYLORI AG STL QL IA: NORMAL
NOROV GI+II ORF1-ORF2 JNC STL QL NAA+PR: NOT DETECTED
O+P STL MICRO: NORMAL
O+P STL MICRO: NORMAL
RVA NSP5 STL QL NAA+PROBE: NOT DETECTED
SALMONELLA SP RPOD STL QL NAA+PROBE: NOT DETECTED
SHIGELLA SP+EIEC IPAH STL QL NAA+PROBE: NOT DETECTED
SPECIMEN SOURCE: NORMAL
V CHOL+PARA RFBL+TRKH+TNAA STL QL NAA+PR: NOT DETECTED
Y ENTERO RECN STL QL NAA+PROBE: NOT DETECTED

## 2019-02-21 DIAGNOSIS — R19.7 DIARRHEA, UNSPECIFIED TYPE: ICD-10-CM

## 2019-02-21 PROCEDURE — 87209 SMEAR COMPLEX STAIN: CPT | Performed by: FAMILY MEDICINE

## 2019-02-21 PROCEDURE — 87177 OVA AND PARASITES SMEARS: CPT | Performed by: FAMILY MEDICINE

## 2019-02-22 LAB
O+P STL MICRO: NORMAL
O+P STL MICRO: NORMAL
SPECIMEN SOURCE: NORMAL

## 2019-03-25 ENCOUNTER — TELEPHONE (OUTPATIENT)
Dept: FAMILY MEDICINE | Facility: CLINIC | Age: 69
End: 2019-03-25

## 2019-03-25 NOTE — TELEPHONE ENCOUNTER
Reason for Call:  Same Day Appointment, Requested Provider:  Nato Ceballos MD    PCP: Pedro Brasher    Reason for visit: [facility] Facility paperwork to be filled out. Dr Brasher will be out of the office that week & has seen TS before so will her fill out the paperwork?    Duration of symptoms: none    Have you been treated for this in the past? Yes    Additional comments: Please work in 4/3 or 4/4, Only days he will be in town    Can we leave a detailed message on this number? YES    Phone number patient can be reached at: Cell number on file:    Telephone Information:   Mobile [phone]       Best Time: any    Call taken on 3/25/2019 at 12:12 PM by Lucrecia Jensen

## 2019-04-02 DIAGNOSIS — K21.9 GASTROESOPHAGEAL REFLUX DISEASE, ESOPHAGITIS PRESENCE NOT SPECIFIED: ICD-10-CM

## 2019-04-02 NOTE — TELEPHONE ENCOUNTER
"Requested Prescriptions   Pending Prescriptions Disp Refills     pantoprazole (PROTONIX) 40 MG EC tablet [Pharmacy Med Name: PANTOPRAZOLE SOD 40MG TAB] 60 tablet 1     Sig: TAKE ONE TABLET BY MOUTH TWICE DAILY BEFORE  MEALS  TAKE  30-60  MINUTES  BEFORE  A  MEAL    PPI Protocol Failed - 4/2/2019  5:05 PM       Failed - Medication is active on med list       Passed - Not on Clopidogrel (unless Pantoprazole ordered)       Passed - No diagnosis of osteoporosis on record       Passed - Recent (12 mo) or future (30 days) visit within the authorizing provider's specialty    Patient had office visit in the last 12 months or has a visit in the next 30 days with authorizing provider or within the authorizing provider's specialty.  See \"Patient Info\" tab in inbasket, or \"Choose Columns\" in Meds & Orders section of the refill encounter.             Passed - Patient is age 18 or older        Last Written Prescription Date:  2/20/18  Last Fill Quantity: 60,  # refills: 1   Last office visit: 2/18/2019 with prescribing provider:  Dr. Ceballos   Future Office Visit:   Next 5 appointments (look out 90 days)    Apr 03, 2019  2:00 PM CDT  Office Visit with Nato [facility]os [facility] [address] [place] [id number] [phone]         Routing refill request to provider for review/approval because:  Drug not active on patient's medication list. Omeprazole is on current med list.   Megan Chisholm RN          "

## 2019-04-03 ENCOUNTER — OFFICE VISIT (OUTPATIENT)
Dept: FAMILY MEDICINE | Facility: CLINIC | Age: 69
End: 2019-04-03
Payer: MEDICARE

## 2019-04-03 VITALS
BODY MASS INDEX: 42.28 KG/M2 | HEIGHT: 71 IN | WEIGHT: 302 LBS | SYSTOLIC BLOOD PRESSURE: 138 MMHG | HEART RATE: 79 BPM | TEMPERATURE: 98.2 F | OXYGEN SATURATION: 93 % | DIASTOLIC BLOOD PRESSURE: 76 MMHG

## 2019-04-03 DIAGNOSIS — Z51.81 MEDICATION MONITORING ENCOUNTER: ICD-10-CM

## 2019-04-03 DIAGNOSIS — Z96.653 STATUS POST TOTAL BILATERAL KNEE REPLACEMENT: ICD-10-CM

## 2019-04-03 DIAGNOSIS — E78.5 HYPERLIPIDEMIA LDL GOAL <100: ICD-10-CM

## 2019-04-03 DIAGNOSIS — G47.33 OSA (OBSTRUCTIVE SLEEP APNEA): ICD-10-CM

## 2019-04-03 DIAGNOSIS — I47.29 PAROXYSMAL VENTRICULAR TACHYCARDIA (H): ICD-10-CM

## 2019-04-03 DIAGNOSIS — Z11.1 SCREENING EXAMINATION FOR PULMONARY TUBERCULOSIS: ICD-10-CM

## 2019-04-03 DIAGNOSIS — K21.9 GASTROESOPHAGEAL REFLUX DISEASE WITHOUT ESOPHAGITIS: ICD-10-CM

## 2019-04-03 DIAGNOSIS — R73.03 PREDIABETES: Primary | ICD-10-CM

## 2019-04-03 DIAGNOSIS — E66.01 MORBID OBESITY DUE TO EXCESS CALORIES (H): ICD-10-CM

## 2019-04-03 PROCEDURE — 86580 TB INTRADERMAL TEST: CPT | Performed by: FAMILY MEDICINE

## 2019-04-03 PROCEDURE — 99214 OFFICE O/P EST MOD 30 MIN: CPT | Performed by: FAMILY MEDICINE

## 2019-04-03 ASSESSMENT — MIFFLIN-ST. JEOR: SCORE: 2151.99

## 2019-04-03 NOTE — PROGRESS NOTES
SUBJECTIVE:   Edis Beltre is a 68 year old male who presents to clinic today for the following health issues:    Forms to be completed prior to moving to [place], to be closer to childern/family. POLST completed today, see attached, cares reviewed, forms completed to community in which he'll be living, see attached.    Sleep Apnea: Stable. Patient is currently prescribed a CPAP machine for sleep apnea management. Patient reports that he uses his machine every night.    GERD: Stable. Patient's GERD is well controlled by 20 mg Omeprazole daily.    Prediabetes: Stable.     Glucose   Date Value Ref Range Status   02/18/2019 102 (H) 70 - 99 mg/dL Final   10/11/2018 106 (H) 70 - 99 mg/dL Final     Comment:     Fasting specimen   04/21/2018 125 (H) 70 - 99 mg/dL Final   02/23/2018 138 (H) 70 - 99 mg/dL Final   02/26/2016 89 70 - 99 mg/dL Final     Lab Results   Component Value Date    A1C 5.7 04/16/2014    A1C 5.5 02/14/2013     Problem list and histories reviewed & adjusted, as indicated.  Additional history: as documented    BP Readings from Last 3 Encounters:   04/03/19 138/76   02/18/19 134/80   12/04/18 136/78       body mass index is 42.12 kg/m .    Wt Readings from Last 4 Encounters:   04/03/19 137 kg (302 lb)   02/18/19 134.7 kg (297 lb)   12/04/18 136.1 kg (300 lb)   10/15/18 136.1 kg (300 lb)       Health Maintenance    Health Maintenance Due   Topic Date Due     ZOSTER IMMUNIZATION (1 of 2) 05/24/1998       Current Problem List    Patient Active Problem List   Diagnosis     Gastroesophageal reflux disease without esophagitis     LAURA (obstructive sleep apnea)     FAMILY HX GI MALIGNANCY     Hyperlipidemia LDL goal <100     Morbid obesity due to excess calories (H)     Prediabetes     S/P total knee arthroplasty     Premature ventricular contraction     Postprocedural pneumothorax - 2014     Metatarsalgia of right foot     Jaw pain     Palpitations     h/o Paroxysmal ventricular tachycardia (H) - s/p  ablation        Past Medical History    Past Medical History:   Diagnosis Date     Arthritis      Calcaneal spur      Esophageal reflux      FAMILY HX GI MALIGNANCY 10/1/2007     Gastro-oesophageal reflux disease      NSVT (nonsustained ventricular tachycardia) (H)     on holter 2/2016     Other chronic pain     Joint pain for 30 years.     Personal history of colonic polyps     adenoma - colonoscopy due 2012     Pneumothorax     recurrent, surgically treated     Premature ventricular contraction     ablation 2/26/2016     Pure hypercholesterolemia      Sleep apnea     CPAP       Past Surgical History    Past Surgical History:   Procedure Laterality Date     ANKLE SURGERY      left     ARTHROPLASTY KNEE  4/14/2014    Procedure: Left Total Knee Arthroplasty ;  Surgeon: Gallito Willis MD;  Location: [facility] OR     BIOPSY      Skin doctor     CHOLECYSTECTOMY, LAPOROSCOPIC  2007     COLONOSCOPY       EYE SURGERY  2013    Cataract surgery on both eyes     H ABLATION PVC  2/26/16     STRIP VEIN      left leg     SURGICAL HISTORY OF -       L VEIN STRIPPING     SURGICAL HISTORY OF -       L MENISCUS- ARTHROSCOPY     THORACIC SURGERY  5/2014    VATS , wedge resction and mechanical pleurodesis -[place], [place]       Current Medications    Current Outpatient Medications   Medication Sig Dispense Refill     alum & mag hydroxide-simethicone (MAALOX REGULAR STRENGTH) 200-200-20 MG/5ML SUSP suspension Take 30 mLs by mouth every 4 hours as needed for indigestion  0     aspirin 81 MG tablet Take 81 mg by mouth daily  30 tablet      DAILY MULTIVITAMIN OR TABS Take 1 tablet by mouth daily       fish oil-omega-3 fatty acids 1000 MG capsule Take 2 g by mouth daily       omeprazole (PRILOSEC) 20 MG DR capsule Take 1 capsule (20 mg) by mouth daily 90 capsule 3     Probiotic Product (ACIDOPHILUS PROBIOTIC BLEND) CAPS Take 1 capsule by mouth daily         Allergies    Allergies   Allergen Reactions     Atorvastatin      Other  reaction(s): Muscle Aches/Weakness       Immunizations    Immunization History   Administered Date(s) Administered     Influenza (High Dose) 3 valent vaccine 11/07/2014, 12/31/2015, 11/01/2016, 10/17/2017, 10/15/2018     Mantoux Tuberculin Skin Test 04/03/2019     Pneumo Conj 13-V (2010&after) 12/31/2015     Pneumococcal 23 valent 05/05/2014     TD (ADULT, 7+) 02/10/1998, 04/19/2011       Family History    Family History   Problem Relation Age of Onset     Colon Cancer Father      Chronic Obstructive Pulmonary Disease Father      Heart Disease Mother         Has a pacemaker     Pacemaker Mother      Other - See Comments Brother         Lyme DZ - infected his heart and brain     No Known Problems Son      Genetic Disorder Paternal Grandmother      Diabetes No family hx of      Hypertension No family hx of      Prostate Cancer No family hx of      Coronary Artery Disease No family hx of        Social History    Social History     Socioeconomic History     Marital status:      Spouse name: An     Number of children: 1     Years of education: Not on file     Highest education level: Not on file   Occupational History     Occupation: retired teacher     Employer: [employer]   Social Needs     Financial resource strain: Not on file     Food insecurity:     Worry: Not on file     Inability: Not on file     Transportation needs:     Medical: Not on file     Non-medical: Not on file   Tobacco Use     Smoking status: Never Smoker     Smokeless tobacco: Never Used   Substance and Sexual Activity     Alcohol use: Yes     Comment: Minimal     Drug use: No     Sexual activity: Not Currently     Partners: Female     Birth control/protection: None   Lifestyle     Physical activity:     Days per week: Not on file     Minutes per session: Not on file     Stress: Not on file   Relationships     Social connections:     Talks on phone: Not on file     Gets together: Not on file     Attends Anglican service:  "Not on file     Active member of club or organization: Not on file     Attends meetings of clubs or organizations: Not on file     Relationship status: Not on file     Intimate partner violence:     Fear of current or ex partner: Not on file     Emotionally abused: Not on file     Physically abused: Not on file     Forced sexual activity: Not on file   Other Topics Concern      Service No     Blood Transfusions Not Asked     Caffeine Concern No     Occupational Exposure Not Asked     Hobby Hazards Not Asked     Sleep Concern Yes     Comment: LAURA- on CPAP     Stress Concern Not Asked     Weight Concern Not Asked     Special Diet Not Asked     Back Care Not Asked     Exercise Yes     Comment: some swimming     Bike Helmet Not Asked     Seat Belt Yes     Self-Exams Not Asked     Parent/sibling w/ CABG, MI or angioplasty before 65F 55M? No   Social History Narrative     Not on file       All above reviewed and updated, all stable unless otherwise noted    Recent labs reviewed    ROS:  Constitutional, HEENT, cardiovascular, pulmonary, GI, , musculoskeletal, neuro, skin, endocrine and psych systems are negative, except as otherwise noted.    OBJECTIVE:                                                    /76 (BP Location: Right arm, Cuff Size: Adult Large)   Pulse 79   Temp 98.2  F (36.8  C) (Oral)   Ht 1.803 m (5' 11\")   Wt 137 kg (302 lb)   SpO2 93%   BMI 42.12 kg/m    Body mass index is 42.12 kg/m .  GENERAL: healthy, alert and no distress  EYES: Eyes grossly normal to inspection  HENT:ear canals and TM's normal upon viewing with otoscope, nose and mouth without ulcers or lesions upon viewing with otoscope, minimal bilateral cerumen impaction greater on right  NECK: no tenderness, no adenopathy, no asymmetry, no masses, no stiffness; thyroid- normal to palpation  RESP: lungs clear to auscultation - no rales, no rhonchi, no wheezes  CV: regular rates and rhythm, normal S1 S2, no S3 or S4 and no " murmur, no click or rub -  ABDOMEN: soft, no tenderness, no  hepatosplenomegaly, no masses, normal bowel sounds  MS: extremities- no gross deformities noted, no edema  SKIN: no suspicious lesions, no rashes  NEURO: strength and tone- normal, sensory exam- grossly normal, mentation- intact, speech- normal  BACK: no CVA tenderness, no paralumbar tenderness  PSYCH: Alert and oriented times 3; speech- coherent , normal rate and volume; able to articulate logical thoughts, able to abstract reason, no tangential thoughts, no hallucinations or delusions, affect- normal    DIAGNOSTICS/PROCEDURES:                                                      No results found for this or any previous visit (from the past 24 hour(s)).     ASSESSMENT/PLAN:                                                        ICD-10-CM    1. Prediabetes R73.03    2. Hyperlipidemia LDL goal <100 E78.5    3. LAURA (obstructive sleep apnea) G47.33    4. Gastroesophageal reflux disease without esophagitis K21.9    5. h/o Paroxysmal ventricular tachycardia (H) - s/p ablation  I47.2    6. Status post total bilateral knee replacement Z96.653    7. Morbid obesity due to excess calories (H) E66.01    8. Medication monitoring encounter Z51.81    9. Screening examination for pulmonary tuberculosis Z11.1 TB INTRADERMAL TEST       Discussed treatment/modality options, including risk and benefits, he desires advised aspirin 81 mg po daily, advised 1 multivitamin per day, advised dentist every 6 months, advised diet and exercise and advised opthalmologist every 1-2 years. All diagnosis above reviewed and noted above, otherwise stable.  See [facility] orders for further details.  Follow up as needed.    1) Patient is currently prescribed a CPAP machine for sleep apnea management. Advised continued use.    2) Patient's GERD is well controlled by 20 mg Omeprazole daily. Advised continued use.    3) POLST and community forms completed today. Forms and PPD needed before  moving completed later this week.     Health Maintenance Due   Topic Date Due     ZOSTER IMMUNIZATION (1 of 2) 05/24/1998     This document serves as a record of the services and decisions personally performed and made by Nato Ceballos MD. It was created on his behalf by Stephen Xiong, a trained medical scribe. The creation of this document is based on the provider's statements to the medical scribe.  Stephen Xiong April 3, 2019 2:28 PM     The information in this document, created by the medical scribe for me, accurately reflects the services I personally performed and the decisions made by me. I have reviewed and approved this document for accuracy prior to leaving the patient care area.  April 3, 2019            Nato Ceballos MD [address]  [address] [phone] Fax

## 2019-04-03 NOTE — PATIENT INSTRUCTIONS
[facility]                        To reach your care team during and after hours:   [phone]  To reach our pharmacy:        [phone]    Clinic Hours                        Our clinic hours are:    Monday   7:30 am to 7:00 pm                  Tuesday through Friday 7:30 am to 5:00 pm                             Saturday   8:00 am to 12:00 pm      Sunday   Closed      Pharmacy Hours                        Our pharmacy hours are:    Monday   8:30 am to 7:00 pm       Tuesday to Friday  8:30 am to 6:00 pm                       Saturday    9:00 am to 1:00 pm              Sunday    Closed              There is also information available at our web site:  [email or web address]    If your provider ordered any lab tests and you do not receive the results within 10 business days, please call the clinic.    If you need a medication refill please contact your pharmacy.  Please allow 2-3 business days for your refill to be completed.    Our clinic offers telephone visits and e visits.  Please ask one of your team members to explain more.      Use [employer] (secure email communication and access to your chart) to send your primary care provider a message or make an appointment. Ask someone on your Team how to sign up for [employer].  Immunizations                      Immunization History   Administered Date(s) Administered     Influenza (High Dose) 3 valent vaccine 11/07/2014, 12/31/2015, 11/01/2016, 10/17/2017, 10/15/2018     Pneumo Conj 13-V (2010&after) 12/31/2015     Pneumococcal 23 valent 05/05/2014     TD (ADULT, 7+) 02/10/1998, 04/19/2011        Health Maintenance                         Health Maintenance Due   Topic Date Due     Zoster (Shingles) Vaccine (1 of 2) 05/24/1998     Discuss Advance Directive Planning  01/17/2019

## 2019-04-04 ENCOUNTER — DOCUMENTATION ONLY (OUTPATIENT)
Dept: OTHER | Facility: CLINIC | Age: 69
End: 2019-04-04

## 2019-04-04 RX ORDER — PANTOPRAZOLE SODIUM 40 MG/1
TABLET, DELAYED RELEASE ORAL
Qty: 60 TABLET | Refills: 1 | OUTPATIENT
Start: 2019-04-04

## 2019-04-04 NOTE — TELEPHONE ENCOUNTER
Patient states that he has currently taking omeprazole 20 mg and is working well for him.   Has remaining refills.   Refused rx refill for pantoprazole due to alternate therapy.  Megan Chisholm RN

## 2019-04-05 ENCOUNTER — ALLIED HEALTH/NURSE VISIT (OUTPATIENT)
Dept: NURSING | Facility: CLINIC | Age: 69
End: 2019-04-05
Payer: MEDICARE

## 2019-04-05 DIAGNOSIS — Z11.1 SCREENING EXAMINATION FOR PULMONARY TUBERCULOSIS: Primary | ICD-10-CM

## 2019-04-05 LAB
PPDINDURATION: 0 MM (ref 0–5)
PPDREDNESS: 0 MM

## 2019-04-05 PROCEDURE — 99207 ZZC NO CHARGE NURSE ONLY: CPT

## 2019-04-05 NOTE — PROGRESS NOTES
Mantoux result:  Lab Results   Component Value Date    PPDREDNESS 0 04/05/2019    PPDINDURATIO 0 04/05/2019         Mantoux results: No induration.  No swelling.  No redness.        RHONDA Samuels, RN, PHN  [facility]) [phone]

## 2019-12-09 ENCOUNTER — HEALTH MAINTENANCE LETTER (OUTPATIENT)
Age: 69
End: 2019-12-09

## 2019-12-11 ENCOUNTER — TELEPHONE (OUTPATIENT)
Dept: FAMILY MEDICINE | Facility: CLINIC | Age: 69
End: 2019-12-11

## 2019-12-11 NOTE — LETTER
[facility] - [address] [address]                                                                                                       [phone]    December 16, 2019    Edis Beltre  [address]   [place] [address]      To Whom it May Concern:    The above patient has:    Obstructive Sleep Apnea: It is stable. He is currently prescribed a CPAP machine for sleep apnea management and reports that he uses his machine every night.  His last face-to-face encounter was 04/03/2019.    Please contact me with questions or concerns.      Sincerely,          Chalino Brasher M.D.

## 2019-12-11 NOTE — TELEPHONE ENCOUNTER
Pt stated a form is needed to confirm the pt is in need of supplies. Currently using Cpap, Pt is in need of supplies but states there is a letter of medical necessity needed for medicare to cover.  Please review and advise to letter needed.    Fax to  [facility] at [phone]    Rufino Marshall RN   [place] [facility]

## 2019-12-11 NOTE — TELEPHONE ENCOUNTER
Reason for Call:  Other Pt states needs ok faxed for cpap supplies    Detailed comments: Pt needs an ok from  to fill cpap supplies for Medicare.  Ok to be faxed [facility] at [phone]    Phone Number Patient can be reached at: Home number on file [phone] (home)    Best Time: anytime    Can we leave a detailed message on this number? YES    Call taken on 12/11/2019 at 1:45 PM by Ariadna Alegria

## 2019-12-11 NOTE — TELEPHONE ENCOUNTER
Does patient need CPAP machine too or just supplies?    Attempt #1  Called patient @ # below - Left a non-detailed message to call back and speak with any triage nurse.    Pricila Madrid RN  [facility]

## 2019-12-16 NOTE — TELEPHONE ENCOUNTER
Patient called - he just needs a letter - Signed and in NORTH in basket - please fax and if he needs more then maybe they can get a form with details needed

## 2019-12-17 NOTE — TELEPHONE ENCOUNTER
Documentation of recent office visit sent to [facility] medical.     Rufino Marshall RN   [facility]

## 2020-01-02 ENCOUNTER — TELEPHONE (OUTPATIENT)
Dept: FAMILY MEDICINE | Facility: CLINIC | Age: 70
End: 2020-01-02

## 2020-01-02 NOTE — LETTER
[facility]          [address]  [place], [place] [address] [phone]  January 2, 2020    Edis Beltre  [address]  [place] [address]    Dear Edis,    I hope you are doing well.       This is a reminder that you are due for a Wellness Visit (annual full physical).   So that you get your desired appointment time, please call [phone] to schedule this or you can use [employer] if you have an account. If you have had this visit completed elsewhere, or you believe you received this letter in error, please contact us at [phone].    In addition, here is a list of due or overdue Health Maintenance reminders.    Health Maintenance Due   Topic Date Due     Zoster (Shingles) Vaccine (1 of 2) 05/24/1998     Flu Vaccine (1) 09/01/2019     Cholesterol Lab  10/11/2019     Prostate Test  10/11/2019     Annual Wellness Visit  10/15/2019     FALL RISK ASSESSMENT  10/15/2019     PHQ-2  01/01/2020     Please call us at [phone] (or use [employer]) to address the above recommendations or we can discuss these reminders further at your appointment.    Have a great day!      Best Regards,          Chalino Brasher M.D.

## 2020-01-02 NOTE — TELEPHONE ENCOUNTER
Needs of attention regarding:  -Wellness (Physical) Visit     Health Maintenance Topics with due status: Overdue       Topic Date Due    ZOSTER IMMUNIZATION 05/24/1998    INFLUENZA VACCINE 09/01/2019    LIPID 10/11/2019    PSA 10/11/2019    MEDICARE ANNUAL WELLNESS VISIT 10/15/2019    FALL RISK ASSESSMENT 10/15/2019    PHQ-2 01/01/2020       Communication:  See Letter

## 2020-03-15 ENCOUNTER — HEALTH MAINTENANCE LETTER (OUTPATIENT)
Age: 70
End: 2020-03-15

## 2020-03-24 ENCOUNTER — MEDICAL CORRESPONDENCE (OUTPATIENT)
Dept: HEALTH INFORMATION MANAGEMENT | Facility: CLINIC | Age: 70
End: 2020-03-24

## 2020-03-24 ENCOUNTER — TELEPHONE (OUTPATIENT)
Dept: FAMILY MEDICINE | Facility: CLINIC | Age: 70
End: 2020-03-24

## 2020-03-24 NOTE — TELEPHONE ENCOUNTER
Forms signed by Feli Patel.     Faxed to [phone] along with office note requested.    Sent to scan.    Valeriano Vivas CMA

## 2021-01-15 ENCOUNTER — HEALTH MAINTENANCE LETTER (OUTPATIENT)
Age: 71
End: 2021-01-15

## 2021-04-14 ENCOUNTER — TELEPHONE (OUTPATIENT)
Dept: FAMILY MEDICINE | Facility: CLINIC | Age: 71
End: 2021-04-14

## 2021-04-14 NOTE — TELEPHONE ENCOUNTER
Reason for Call:  Form, our goal is to have forms completed with 72 hours, however, some forms may require a visit or additional information.    Type of letter, form or note:  medical    Who is the form from?: [facility] (if other please explain)    Where did the form come from: form was faxed in    What clinic location was the form placed at?: [place]    Where the form was placed: Pedro Brasher MD Box/Folder    What number is listed as a contact on the form?: Fax to [phone]       Call taken on 4/14/2021 at 2:07 PM by Catrina Foster

## 2021-04-15 ENCOUNTER — MEDICAL CORRESPONDENCE (OUTPATIENT)
Dept: HEALTH INFORMATION MANAGEMENT | Facility: CLINIC | Age: 71
End: 2021-04-15

## 2021-04-19 NOTE — TELEPHONE ENCOUNTER
Completed forms faxed back to [facility] at [phone].   Originals sent to be scanned.       Anuradha hCong

## 2021-05-09 ENCOUNTER — HEALTH MAINTENANCE LETTER (OUTPATIENT)
Age: 71
End: 2021-05-09

## 2021-06-18 NOTE — TELEPHONE ENCOUNTER
Last vitals:   Vitals:    05/24/18 1030   BP: 119/73   Pulse: 72   Resp: 16   Temp: 36.8  C (98.3  F)   SpO2: 100%     Patient's level of consciousness is drowsy  Spontaneous respirations: yes  Maintains airway independently: yes  Dentition unchanged: yes  Oropharynx: oropharynx clear of all foreign objects    QCDR Measures:  ASA# 20 - Surgical Safety Checklist: WHO surgical safety checklist completed prior to induction  PQRS# 430 - Adult PONV Prevention: 4558F - Pt received => 2 anti-emetic agents (different classes) preop & intraop  ASA# 8 - Peds PONV Prevention: NA - Not pediatric patient, not GA or 2 or more risk factors NOT present  PQRS# 424 - Sil-op Temp Management: NA - MAC anesthesia or case < 60 minutes  PQRS# 426 - PACU Transfer Protocol: - Transfer of care checklist used  ASA# 14 - Acute Post-op Pain: NA - Patient under age 10y or did not go to PACU   Noted and the overnight stay and workup should justify stress testing.

## 2021-10-24 ENCOUNTER — HEALTH MAINTENANCE LETTER (OUTPATIENT)
Age: 71
End: 2021-10-24

## 2022-06-05 ENCOUNTER — HEALTH MAINTENANCE LETTER (OUTPATIENT)
Age: 72
End: 2022-06-05

## 2022-10-15 ENCOUNTER — HEALTH MAINTENANCE LETTER (OUTPATIENT)
Age: 72
End: 2022-10-15

## 2022-12-03 NOTE — TELEPHONE ENCOUNTER
Pt noted they were in the ER with Dr. Husain, overnight.    Pt had same heart sx as 3 years ago, had no sx while overnight though. Pt is on a heart monitor right now.     Pt reported Irma the PA at [facility] wanted the pt to do a nuclear stress test. Needs this to be covered by medicare. And to verify the DX is/are substantial for coverage.    Pt reports they have trouble breathing on the treadmill and RL is aware.    [phone] call back pt and advise if ok.    Routing to PCP for further review/recommendations/orders.  Vanessa Sanders RN  [place] [facility]             PAST SURGICAL HISTORY:  H/O forearm fracture 1995 (R)    History of Colon Resection 2001    S/P Breast Lumpectomy (L) 1999    S/P CABG x 2 2010

## 2023-06-11 ENCOUNTER — HEALTH MAINTENANCE LETTER (OUTPATIENT)
Age: 73
End: 2023-06-11

## (undated) RX ORDER — REGADENOSON 0.08 MG/ML
INJECTION, SOLUTION INTRAVENOUS
Status: DISPENSED
Start: 2018-05-02